# Patient Record
Sex: FEMALE | Race: WHITE | NOT HISPANIC OR LATINO | Employment: OTHER | URBAN - METROPOLITAN AREA
[De-identification: names, ages, dates, MRNs, and addresses within clinical notes are randomized per-mention and may not be internally consistent; named-entity substitution may affect disease eponyms.]

---

## 2017-01-03 ENCOUNTER — TRANSCRIBE ORDERS (OUTPATIENT)
Dept: MAMMOGRAPHY | Facility: HOSPITAL | Age: 72
End: 2017-01-03

## 2017-01-03 DIAGNOSIS — Z12.31 VISIT FOR SCREENING MAMMOGRAM: Primary | ICD-10-CM

## 2017-01-25 ENCOUNTER — HOSPITAL ENCOUNTER (OUTPATIENT)
Dept: MAMMOGRAPHY | Facility: HOSPITAL | Age: 72
Discharge: HOME | End: 2017-01-25
Attending: INTERNAL MEDICINE | Admitting: INTERNAL MEDICINE

## 2017-01-25 DIAGNOSIS — Z12.31 VISIT FOR SCREENING MAMMOGRAM: ICD-10-CM

## 2017-01-25 PROCEDURE — 77063 BREAST TOMOSYNTHESIS BI: CPT | Performed by: RADIOLOGY

## 2017-01-25 PROCEDURE — 77063 BREAST TOMOSYNTHESIS BI: CPT

## 2017-01-25 PROCEDURE — G0202 SCR MAMMO BI INCL CAD: HCPCS

## 2017-01-25 PROCEDURE — G0202 SCR MAMMO BI INCL CAD: HCPCS | Performed by: RADIOLOGY

## 2018-06-15 ENCOUNTER — APPOINTMENT (OUTPATIENT)
Dept: CT IMAGING | Facility: HOSPITAL | Age: 73
End: 2018-06-15

## 2018-06-15 ENCOUNTER — HOSPITAL ENCOUNTER (EMERGENCY)
Facility: HOSPITAL | Age: 73
Discharge: HOME | End: 2018-06-16
Attending: EMERGENCY MEDICINE | Admitting: EMERGENCY MEDICINE
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
## 2018-06-15 DIAGNOSIS — S02.2XXA CLOSED FRACTURE OF NASAL BONE, INITIAL ENCOUNTER: ICD-10-CM

## 2018-06-15 DIAGNOSIS — S01.511A LIP LACERATION, INITIAL ENCOUNTER: ICD-10-CM

## 2018-06-15 PROCEDURE — 99283 EMERGENCY DEPT VISIT LOW MDM: CPT

## 2018-06-15 PROCEDURE — 70450 CT HEAD/BRAIN W/O DYE: CPT

## 2018-06-15 PROCEDURE — 70486 CT MAXILLOFACIAL W/O DYE: CPT

## 2018-06-15 RX ORDER — ASPIRIN 81 MG/1
81 TABLET ORAL DAILY
COMMUNITY
End: 2020-07-13

## 2018-06-15 RX ORDER — LIDOCAINE HYDROCHLORIDE AND EPINEPHRINE 10; 10 MG/ML; UG/ML
10 INJECTION, SOLUTION INFILTRATION; PERINEURAL ONCE
Status: DISCONTINUED | OUTPATIENT
Start: 2018-06-15 | End: 2018-06-16 | Stop reason: HOSPADM

## 2018-06-16 VITALS
WEIGHT: 184 LBS | OXYGEN SATURATION: 94 % | TEMPERATURE: 97.5 F | HEART RATE: 106 BPM | BODY MASS INDEX: 31.41 KG/M2 | RESPIRATION RATE: 18 BRPM | SYSTOLIC BLOOD PRESSURE: 129 MMHG | DIASTOLIC BLOOD PRESSURE: 70 MMHG | HEIGHT: 64 IN

## 2018-06-16 RX ORDER — TRAMADOL HYDROCHLORIDE 50 MG/1
50 TABLET ORAL EVERY 6 HOURS PRN
Qty: 12 TABLET | Refills: 0 | Status: SHIPPED | OUTPATIENT
Start: 2018-06-16 | End: 2018-12-19

## 2018-06-16 RX ORDER — CHLORHEXIDINE GLUCONATE 0.12 MG/ML
15 RINSE ORAL 4 TIMES DAILY
Qty: 473 ML | Refills: 0 | Status: SHIPPED | OUTPATIENT
Start: 2018-06-16 | End: 2018-06-19 | Stop reason: SDUPTHER

## 2018-06-16 NOTE — ED PROVIDER NOTES
Subjective   Ms. White is a 72 y.o. female who presents to the ED with c/o a fall. She reports that she was stepping onto her driveway from the street earlier today when she accidentally tripped on the uneven concrete, fell forward, and hit her head. She then developed dizziness after the episode but she denies LOC. She currently complains of forehead tenderness and a laceration to her lower lip. Her tetanus shot is out of date. No other acute complaints at this time.        History provided by:  Patient  Fall   Mechanism of injury: fall    Injury location:  Mouth  Mouth injury location:  Lower inner lip  Fall:     Fall occurred:  Tripped    Impact surface:  [place]    Point of impact:  Head    Entrapped after fall: no    Protective equipment: none    Suspicion of alcohol use: no    Suspicion of drug use: no    Tetanus status:  Out of date  Prior to arrival data:     Patient ambulatory at scene: yes      Loss of consciousness: no      Amnesic to event: no    Associated symptoms: no loss of consciousness        Review of Systems   Skin: Positive for wound (Laceration to lower lip).   Neurological: Positive for dizziness. Negative for loss of consciousness.   All other systems reviewed and are negative.      Past Medical History:   Diagnosis Date   • Hyperlipidemia    • Hypertension        Allergies   Allergen Reactions   • Sulfa Antibiotics GI Intolerance   • Tetanus Toxoids        Past Surgical History:   Procedure Laterality Date   • KNEE SURGERY     • THYROID SURGERY         Family History   Problem Relation Age of Onset   • Breast cancer Neg Hx    • Ovarian cancer Neg Hx        Social History     Social History   • Marital status:      Social History Main Topics   • Smoking status: Never Smoker   • Smokeless tobacco: Never Used   • Alcohol use Yes      Comment: monthly drink   • Drug use: No     Other Topics Concern   • Not on file         Objective   Physical Exam   Constitutional: She is oriented to  person, place, and time. She appears well-developed and well-nourished. No distress.   HENT:   Head: Normocephalic and atraumatic.       Nose: No nasal septal hematoma.   Patient has left periorbital ecchymosis, and nasal swelling with mild blood right nare.   Eyes: Conjunctivae are normal. No scleral icterus.   Neck: Normal range of motion. Neck supple.   Cardiovascular: Normal rate, regular rhythm, normal heart sounds and intact distal pulses.    No murmur heard.  Pulmonary/Chest: Effort normal and breath sounds normal. No respiratory distress.   Abdominal: Soft. There is no tenderness.   Musculoskeletal: Normal range of motion. She exhibits no tenderness (No c-spine tenderness).   Neurological: She is alert and oriented to person, place, and time.   Skin: Skin is warm and dry. Laceration noted. She is not diaphoretic.   Patient has a 1.5cm parallel U-shaped laceration to her lower lip that does not cross the vermilion border. It is likely through and through.   Psychiatric: She has a normal mood and affect. Her behavior is normal.   Nursing note and vitals reviewed.      Laceration Repair  Date/Time: 6/16/2018 12:08 AM  Performed by: STEPHEN NEWTON  Authorized by: STEPHEN NEWTON     Consent:     Consent obtained:  Verbal    Consent given by:  Patient    Risks discussed:  Infection, pain, poor cosmetic result, poor wound healing, need for additional repair and nerve damage  Anesthesia (see MAR for exact dosages):     Anesthesia method:  Local infiltration    Local anesthetic:  Lidocaine 1% WITH epi  Laceration details:     Location:  Lip    Lip location:  Lower interior lip    Length (cm):  4  Repair type:     Repair type:  Intermediate  Pre-procedure details:     Preparation:  Patient was prepped and draped in usual sterile fashion and imaging obtained to evaluate for foreign bodies  Exploration:     Wound exploration: wound explored through full range of motion and entire depth of wound probed and  visualized      Wound extent: no foreign bodies/material noted and no underlying fracture noted      Contaminated: no    Treatment:     Area cleansed with:  Betadine    Amount of cleaning:  Extensive    Irrigation solution:  Sterile saline  Skin repair:     Repair method:  Sutures    Suture size:  5-0    Suture material:  Fast-absorbing gut    Suture technique:  Simple interrupted    Number of sutures:  3  Approximation:     Approximation:  Loose    Vermilion border: well-aligned    Post-procedure details:     Dressing:  Open (no dressing)    Patient tolerance of procedure:  Tolerated well, no immediate complications                   ED Course  ED Course as of Jun 16 0421   Sat Jun 16, 2018   0021 The inner laceration has no gaping and cannot be pulled apart, thus with infection risk I left this one open with no sutures.  The outside laceration with gaping open and thus we loosely approximated with 3 fast gut sutures.  Follow-up and return instructions were discussed with the patient.  [RS]      ED Course User Index  [RS] Zachariah Gilmore MD       No results found for this or any previous visit (from the past 24 hour(s)).  Note: In addition to lab results from this visit, the labs listed above may include labs taken at another facility or during a different encounter within the last 24 hours. Please correlate lab times with ED admission and discharge times for further clarification of the services performed during this visit.    CT Facial Bones Without Contrast   Final Result     Mildly displaced bilateral nasal bone fractures.         THIS DOCUMENT HAS BEEN ELECTRONICALLY SIGNED BY TOYA PRUITT MD      CT Head Without Contrast   Final Result     No acute intracranial abnormality.         THIS DOCUMENT HAS BEEN ELECTRONICALLY SIGNED BY TOYA PRUITT MD        Vitals:    06/15/18 2057 06/15/18 2337 06/16/18 0033   BP: (!) 191/78  129/70   BP Location: Left arm     Patient Position: Sitting     Pulse: 73 70 106  "  Resp: 16 18    Temp: 97.8 °F (36.6 °C)  97.5 °F (36.4 °C)   TempSrc: Oral  Axillary   SpO2: 96% 95% 94%   Weight: 83.5 kg (184 lb)     Height: 162.6 cm (64\")       Medications - No data to display  ECG/EMG Results (last 24 hours)     ** No results found for the last 24 hours. **                      MDM  Number of Diagnoses or Management Options  Closed fracture of nasal bone, initial encounter:   Fall from slip, trip, or stumble, initial encounter:   Lip laceration, initial encounter:      Amount and/or Complexity of Data Reviewed  Tests in the radiology section of CPT®: reviewed  Independent visualization of images, tracings, or specimens: yes        Final diagnoses:   Fall from slip, trip, or stumble, initial encounter   Lip laceration, initial encounter   Closed fracture of nasal bone, initial encounter       Documentation assistance provided by curtis Villafana.  Information recorded by the scribe was done at my direction and has been verified and validated by me.     Jossie Villafana  06/15/18 2308       Jossie Villafana  06/16/18 0021       Zachariah Gilmore MD  06/16/18 0421    "

## 2018-06-19 ENCOUNTER — OFFICE VISIT (OUTPATIENT)
Dept: FAMILY MEDICINE CLINIC | Facility: CLINIC | Age: 73
End: 2018-06-19

## 2018-06-19 VITALS
SYSTOLIC BLOOD PRESSURE: 126 MMHG | WEIGHT: 186 LBS | DIASTOLIC BLOOD PRESSURE: 68 MMHG | HEIGHT: 64 IN | BODY MASS INDEX: 31.76 KG/M2

## 2018-06-19 DIAGNOSIS — E66.9 OBESITY (BMI 30-39.9): ICD-10-CM

## 2018-06-19 DIAGNOSIS — S02.2XXA CLOSED FRACTURE OF NASAL BONE, INITIAL ENCOUNTER: ICD-10-CM

## 2018-06-19 DIAGNOSIS — I10 ESSENTIAL HYPERTENSION: Primary | ICD-10-CM

## 2018-06-19 DIAGNOSIS — E78.5 HYPERLIPIDEMIA, UNSPECIFIED HYPERLIPIDEMIA TYPE: ICD-10-CM
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
## 2018-06-19 PROBLEM — S42.202D CLOSED FRACTURE OF PROXIMAL END OF LEFT HUMERUS WITH ROUTINE HEALING: Status: ACTIVE | Noted: 2018-06-19

## 2018-06-19 PROBLEM — Z12.11 SCREEN FOR COLON CANCER: Status: ACTIVE | Noted: 2018-06-19

## 2018-06-19 PROBLEM — Q66.70 HIGH ARCHES: Status: ACTIVE | Noted: 2018-06-19

## 2018-06-19 PROCEDURE — 99203 OFFICE O/P NEW LOW 30 MIN: CPT | Performed by: INTERNAL MEDICINE

## 2018-06-19 RX ORDER — CHLORHEXIDINE GLUCONATE 0.12 MG/ML
15 RINSE ORAL 4 TIMES DAILY
Qty: 473 ML | Refills: 0 | Status: SHIPPED | OUTPATIENT
Start: 2018-06-19 | End: 2018-06-21 | Stop reason: SDUPTHER

## 2018-06-19 NOTE — PROGRESS NOTES
72F here to establish care. Prior pt of Dr. Brush.      -5d prior , tripped and fell forward, landing on face - b/l nasal fractures - seeing ent this week, sutures to bottom lip, ct face and ct head performed - no bleed, fracture as above.  Currently no pain. No h/a, dizziness, no vision changes.      Chronic issues:    htn - compliant with med, metoprolol  Hyperlipidemia - taking pravastatin without myalgias.      Ros:   occassional nocturia, incontinence, +frquent urination, +itching, +difficulty wakling(chronic), +sleep disturbance, +mm weakness (getting out of chair),sometimes feels cold  Comprehensive review otherwise negative.    CPE in 3/18    HM: pt documents most  utd with Dr. Brush    Patient Active Problem List   Diagnosis   • Hyperlipidemia   • Essential hypertension   • High arches   • Screen for colon cancer   • Closed fracture of proximal end of left humerus with routine healing   • Nasal fracture   • Obesity (BMI 30-39.9)      Past Surgical History:   Procedure Laterality Date   • KNEE SURGERY      partial replacement both knees -  - 2014   • PARATHYROIDECTOMY      1/4 removed 2011        Current Outpatient Prescriptions:   •  chlorhexidine (PERIDEX) 0.12 % solution, Apply 15 mL to the mouth or throat 4 (Four) Times a Day., Disp: 473 mL, Rfl: 0  •  metoprolol tartrate (LOPRESSOR) 50 MG tablet, Take 50 mg by mouth 2 (Two) Times a Day., Disp: , Rfl:   •  pravastatin (PRAVACHOL) 40 MG tablet, Take 40 mg by mouth Daily., Disp: , Rfl:   •  traMADol (ULTRAM) 50 MG tablet, Take 1 tablet by mouth Every 6 (Six) Hours As Needed for Moderate Pain ., Disp: 12 tablet, Rfl: 0  •  vitamin E 100 UNIT capsule, Take 100 Units by mouth Daily., Disp: , Rfl:   •  aspirin 81 MG EC tablet, Take 81 mg by mouth Daily., Disp: , Rfl:    Allergies   Allergen Reactions   • Sulfa Antibiotics GI Intolerance   • Tetanus Toxoids      Social History     Social History   • Marital status:      Spouse name:  "Luis Alberto   • Number of children: 2   • Years of education: N/A     Occupational History   • 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    2 kids:    Ashli and Con.    1 gk    Retired teacher.    Hobbies: cross stitch, Silver Sneakers              Family History   Problem Relation Age of Onset   • Alzheimer's disease Mother    • Coronary artery disease Father    • Multiple sclerosis Father    • ALS Brother    • Hypertension Brother    • Diabetes type II Son    • Breast cancer Neg Hx    • Ovarian cancer Neg Hx       /68   Ht 162.6 cm (64.02\")   Wt 84.4 kg (186 lb)   Breastfeeding? No   BMI 31.91 kg/m²   Gen: well appearing in nad, no resp effort  Head/ face: echymosis over eyes and bridge of nose b/l, tenderness to palpation of bridge of nose  Eyes: conjunctiva clear, perrl, eomi  ENT: mmm, no thyromegaly, no lymphadenopathy, lower lip with sutures, no active bleeding  CV: s1, s2 reg no m/r/g, no bruits, no jvd  No peripheral edema, pedal pulses intact  Resp:  clear b/l no w/r/r  GI:  soft nt/nd  Skin: no clubbing or cyanosis  Neuro: no focal deficits.    A/P    1. Essential hypertension - well controlled, cont current med   2. Hyperlipidemia, unspecified hyperlipidemia type - cont pravastatin  Will review records for recent fasting labs done onc ereceived   3. Obesity (BMI 30-39.9)   -counseled on wt, bmi, goals for wt loss, exercise, diet -    4. Closed fracture of nasal bone, initial encounter - has ent consult tomorrow   5. Fall, initial encounter - mechanical 5d prior       F/u 3mo chronic issues  Request of medical records  "

## 2018-06-21 ENCOUNTER — TELEPHONE (OUTPATIENT)
Dept: FAMILY MEDICINE CLINIC | Facility: CLINIC | Age: 73
End: 2018-06-21

## 2018-06-21 RX ORDER — CHLORHEXIDINE GLUCONATE 0.12 MG/ML
15 RINSE ORAL 4 TIMES DAILY
Qty: 473 ML | Refills: 0 | Status: SHIPPED | OUTPATIENT
Start: 2018-06-21 | End: 2019-06-03

## 2018-06-21 NOTE — TELEPHONE ENCOUNTER
PT NEEDS REFILL ON CHLORHEXIDINE 0.12% SOLUTION AT [facility]. PT IS LEAVING ON SAT FOR VACATION PLEASE CALL ASAP

## 2018-09-04 ENCOUNTER — TRANSCRIBE ORDERS (OUTPATIENT)
Dept: ADMINISTRATIVE | Facility: HOSPITAL | Age: 73
End: 2018-09-04

## 2018-09-04 DIAGNOSIS — Z12.31 VISIT FOR SCREENING MAMMOGRAM: Primary | ICD-10-CM

## 2018-10-10 ENCOUNTER — HOSPITAL ENCOUNTER (OUTPATIENT)
Dept: MAMMOGRAPHY | Facility: HOSPITAL | Age: 73
Discharge: HOME | End: 2018-10-10
Attending: INTERNAL MEDICINE | Admitting: INTERNAL MEDICINE

## 2018-10-10 DIAGNOSIS — Z12.31 VISIT FOR SCREENING MAMMOGRAM: ICD-10-CM

## 2018-10-10 PROCEDURE — 77067 SCR MAMMO BI INCL CAD: CPT

## 2018-10-10 PROCEDURE — 77067 SCR MAMMO BI INCL CAD: CPT | Performed by: RADIOLOGY

## 2018-10-10 PROCEDURE — 77063 BREAST TOMOSYNTHESIS BI: CPT

## 2018-10-10 PROCEDURE — 77063 BREAST TOMOSYNTHESIS BI: CPT | Performed by: RADIOLOGY

## 2018-12-19 ENCOUNTER — OFFICE VISIT (OUTPATIENT)
Dept: FAMILY MEDICINE CLINIC | Facility: CLINIC | Age: 73
End: 2018-12-19

## 2018-12-19 VITALS
OXYGEN SATURATION: 99 % | BODY MASS INDEX: 32.65 KG/M2 | HEIGHT: 65 IN | DIASTOLIC BLOOD PRESSURE: 84 MMHG | WEIGHT: 196 LBS | SYSTOLIC BLOOD PRESSURE: 138 MMHG | HEART RATE: 64 BPM

## 2018-12-19 DIAGNOSIS — E78.5 HYPERLIPIDEMIA, UNSPECIFIED HYPERLIPIDEMIA TYPE: ICD-10-CM

## 2018-12-19 DIAGNOSIS — I10 ESSENTIAL HYPERTENSION: Primary | ICD-10-CM

## 2018-12-19 DIAGNOSIS — E66.9 OBESITY (BMI 30-39.9): ICD-10-CM

## 2018-12-19 PROBLEM — E21.3 HYPERPARATHYROIDISM: Status: ACTIVE | Noted: 2018-12-19

## 2018-12-19 PROCEDURE — 90662 IIV NO PRSV INCREASED AG IM: CPT | Performed by: INTERNAL MEDICINE

## 2018-12-19 PROCEDURE — G0008 ADMIN INFLUENZA VIRUS VAC: HCPCS | Performed by: INTERNAL MEDICINE

## 2018-12-19 PROCEDURE — 99213 OFFICE O/P EST LOW 20 MIN: CPT | Performed by: INTERNAL MEDICINE

## 2018-12-19 NOTE — PATIENT INSTRUCTIONS
I recommend the Shingrix vaccine to prevent shingles. (even if you have had a previous shingles vaccine or have had an outbreak of shingles).    A person who has not received a shingles vaccine has a lifetime risk of shingles of 33%.  The old version of the shingles vaccine (Zostavax) reduced the risk of shingles to 16%.  The new SHINGRIX vaccine reduces the lifetime risk of shingles to approximately 8%.  In addition, the SHINGRIX vaccine reduces the severity of shingles in those who develop shingles. It is a 2 dose vaccine. The second vaccine should be administered 2 - 6 months after the first.     The CDC recommends this vaccine for patients over 50 years of age. They recommend that patients who received the prior shingles vaccine, get this new one because of its improved effectiveness. Due to high demand and short supply, we are experiencing some intermittent shortages of vaccine. If you didn't receive your vaccine today, call our office every 2-4 weeks to see if it is restocked.    Medicare does NOT cover SHINGRIX in doctors' offices.   Medicare Part D covers SHINGRIX well at most pharmacies (where the cost is similar to a typical medication co-pay).         Pursue hepatitis A vaccine

## 2018-12-19 NOTE — PROGRESS NOTES
72F here for f/u chronic issues:    Immunization review:   Flu shot today  Recommend hep a and shingrix    Htn - compliant with med metoprolol  No home checks, well controlled in office     Hyperlipidemia - on statin and tolerating without myalgias.    Obesity - limited exercise, tries to comply with healthy diet.  Low salt diet.  Eats out moderately  Wt Readings from Last 3 Encounters:   12/19/18 88.9 kg (196 lb)   12/03/18 86.2 kg (190 lb)   06/19/18 84.4 kg (186 lb)         Hair loss - gradual, diffuse, discussed with dermatology in past  Taking biotin with some effect.    Review of Systems   General: no fatigue, fever/chills, unintentional wt loss, malaise, night sweats  Skin: no rash, no hives, no lesions,   Eyes: no visual disturbance   Heme: no brusing, no bleeding  ENT: no hearing loss, no dizziness, no nosebleed, no hoarseness  Endocrine: , no polyuria, polyphagia, polydipsia, no heat or cold intolerance  GI: no nausea, no vomiting, no diarrhea, no constipation, no bleeding, no pain  : no dysuria, no urinary frequency,, no hematuria, or incontinence  Extremities: no edema, , no claudication  Cardiac: no chest pain, no palpitations, no orthopnea, no PND  Respiratory: no cough, no sputum, no wheezing, no sob , no hemoptysis  Neuro: no headache, no seizure,, no paresthesias or weakness  Psych: no anxiety, no depression    Patient Active Problem List   Diagnosis   • Hyperlipidemia   • Essential hypertension   • High arches   • Screen for colon cancer   • Closed fracture of proximal end of left humerus with routine healing   • Nasal fracture   • Obesity (BMI 30-39.9)   • Hyperparathyroidism (CMS/HCC)      Past Surgical History:   Procedure Laterality Date   • KNEE SURGERY      partial replacement both knees -  - 2014   • PARATHYROIDECTOMY      1/4 removed 2011        Current Outpatient Medications:   •  aspirin 81 MG EC tablet, Take 81 mg by mouth Daily., Disp: , Rfl:   •  azelastine (ASTELIN)  0.1 % nasal spray, 1 spray into the nostril(s) as directed by provider 2 (Two) Times a Day., Disp: 1 each, Rfl: 0  •  Calcium Carbonate-Vitamin D (CALCIUM-D PO), Take  by mouth., Disp: , Rfl:   •  chlorhexidine (PERIDEX) 0.12 % solution, Apply 15 mL to the mouth or throat 4 (Four) Times a Day., Disp: 473 mL, Rfl: 0  •  metoprolol tartrate (LOPRESSOR) 50 MG tablet, Take 50 mg by mouth 2 (Two) Times a Day., Disp: , Rfl:   •  pravastatin (PRAVACHOL) 40 MG tablet, Take 40 mg by mouth Daily., Disp: , Rfl:   •  vitamin E 100 UNIT capsule, Take 100 Units by mouth Daily., Disp: , Rfl:    Allergies   Allergen Reactions   • Morphine Nausea Only   • Sulfa Antibiotics GI Intolerance   • Tetanus Toxoid Hives   • Tetanus Toxoids      Social History     Socioeconomic History   • Marital status:      Spouse name: Luis Alberto   • Number of children: 2   • Years of education: Not on file   • Highest education level: Not on file   Social Needs   • Financial resource strain: Not on file   • Food insecurity - worry: Not on file   • Food insecurity - inability: Not on file   • Transportation needs - medical: Not on file   • Transportation needs - non-medical: Not on file   Occupational History   • Occupation: 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    2 kids:    Ashli and Con.    1     Retired teacher.    Hobbies: cross stitch, Silver Sneakers          Family History   Problem Relation Age of Onset   • Alzheimer's disease Mother    • Coronary artery disease Father    • Multiple sclerosis Father    • Hypertension Father    • Heart attack Father    • ALS Brother    • Hypertension Brother    • Diabetes type II Son    • Asthma  "Son    • Breast cancer Neg Hx    • Ovarian cancer Neg Hx       /84   Pulse 64   Ht 165.1 cm (65\")   Wt 88.9 kg (196 lb)   SpO2 99%   BMI 32.62 kg/m²   Gen: well appearing in nad, no resp effort  Eyes: conjunctiva clear, perrl, eomi  ENT: mmm, no thyromegaly, no lymphadenopathy  CV: s1, s2 reg no m/r/g, no bruits, no jvd  No peripheral edema, pedal pulses intact  Resp:  clear b/l no w/r/r  GI:  soft nt/nd  Skin: no clubbing or cyanosis  Neuro: no focal deficits.    A/P    1. Essential hypertension - well controlled, cont current med   2. Hyperlipidemia, unspecified hyperlipidemia type - cont statin   3. Obesity (BMI 30-39.9) - counseled on wt, bmi, diet, exercise and goals for wt loss     Recommend hepatitis A and Shingrix vaccines            "

## 2019-05-02 ENCOUNTER — TELEPHONE (OUTPATIENT)
Dept: FAMILY MEDICINE CLINIC | Facility: CLINIC | Age: 74
End: 2019-05-02

## 2019-05-02 RX ORDER — PRAVASTATIN SODIUM 40 MG
40 TABLET ORAL DAILY
Qty: 30 TABLET | Refills: 0 | Status: SHIPPED | OUTPATIENT
Start: 2019-05-02 | End: 2019-06-08 | Stop reason: SDUPTHER

## 2019-05-02 RX ORDER — METOPROLOL TARTRATE 50 MG/1
50 TABLET, FILM COATED ORAL DAILY
Qty: 30 TABLET | Refills: 0 | Status: SHIPPED | OUTPATIENT
Start: 2019-05-02 | End: 2019-06-08 | Stop reason: SDUPTHER

## 2019-05-02 NOTE — TELEPHONE ENCOUNTER
Renuka Storey called, Patient normally gets Metoprolol Succinate ER but metoprolol tartrate rapid release was sent in. Is this correct? Please call back at [phone]

## 2019-05-30 RX ORDER — PRAVASTATIN SODIUM 40 MG
TABLET ORAL
Qty: 30 TABLET | Refills: 0 | OUTPATIENT
Start: 2019-05-30

## 2019-05-30 RX ORDER — METOPROLOL SUCCINATE 50 MG/1
TABLET, EXTENDED RELEASE ORAL
Qty: 30 TABLET | Refills: 0 | OUTPATIENT
Start: 2019-05-30

## 2019-06-02 PROBLEM — E78.00 PURE HYPERCHOLESTEROLEMIA: Status: ACTIVE | Noted: 2018-06-19

## 2019-06-02 NOTE — PROGRESS NOTES
Subjective   Cindy Phillips is a 73 y.o. female.     History of Present Illness     New pt here to establish. H/o:    Hyperlipidemia-she is on pravastatin.  Last labs in [facility] are from March 2018 and include FLP, A1c, CMP, TSH, D.  These were all normal.  Her D was 35.  Glucose was 105.  A1c was 5.6.  LDL was 115.  She does not think she has had her cholesterol labs checked since then, though her dermatologist did do some blood work earlier this year to evaluate the pruritus    Hypertension-she is on metoprolol and generally BPs are ~130/70 at the doctor's offices.  She says her blood pressure medication was started about 8 or 9 years ago after she got dehydrated and fell and broke her left arm (saw Dr. Landrum at the time).  May have been started for the autonomic dysfunction rather than hypertension    Rash over the last 2 months - started on her back and was very itchy, then spread onto trunck and some on her legs/arms as well. she has tried different OTCs - sarna - made it worse, and now is now using benadryl cream and also is on daily zytrec. she has been seeing dermatology.  She will be having patch testing today as it is felt to be a contact  dermatitis    Hair thinning over the last 6 months. She tried biotin and hair/nail vitamin, but she stopped these in case they were contributing to the rash. She had CBC, liver, viral hep, vit D - and these were all ok.  Dermatologist has given nurse shampoo as well but she has not felt like it has helped that much    Long history of frequent falls and poor balance.  She had previously seen Dr. Brush and she said Dr. Brush had sent her to see a neurologist but no diagnosis was made.  She does not remember ever having a nerve conduction test.  She does not feel like she has neuropathy- sensation seems normal on her feet.  She does have a diagnosis of autonomic dysfunction listed in her chart    The following portions of the patient's history were reviewed and updated  "as appropriate: allergies, current medications, past family history, past medical history, past social history, past surgical history and problem list.    Review of Systems   Constitutional: Negative for activity change, appetite change, fever, unexpected weight gain and unexpected weight loss.   Respiratory: Negative for shortness of breath.    Cardiovascular: Negative for chest pain.   Musculoskeletal: Positive for arthralgias.   Skin: Positive for rash.   Allergic/Immunologic: Negative for immunocompromised state.   Neurological: Positive for weakness (B legs). Negative for seizures, memory problem and confusion.   Psychiatric/Behavioral: Negative for agitation and depressed mood.       Objective   /76 (BP Location: Right arm)   Pulse 54   Temp 98.1 °F (36.7 °C) (Temporal)   Ht 163.7 cm (64.45\")   Wt 85.9 kg (189 lb 6.4 oz)   SpO2 98%   BMI 32.06 kg/m²   Physical Exam   Constitutional: She is oriented to person, place, and time. She appears well-developed and well-nourished. No distress.   HENT:   Head: Normocephalic and atraumatic.   Eyes: Conjunctivae and EOM are normal. Pupils are equal, round, and reactive to light. Right eye exhibits no discharge. Left eye exhibits no discharge.   Neck: Normal range of motion. Neck supple.   Cardiovascular: Normal rate and regular rhythm.   Pulmonary/Chest: Effort normal and breath sounds normal.   Abdominal: Soft. Bowel sounds are normal.   Musculoskeletal: She exhibits no edema.   Neurological: She is alert and oriented to person, place, and time. She displays abnormal reflex (3/4 B UE, LE). No cranial nerve deficit. She exhibits normal muscle tone. Coordination normal.   Does have to use her hands to push off from chair when standing up   Skin: Skin is warm and dry. Rash (.Diffuse erythematous rash on upper chest.  Smaller patches of erythematous macules on her back and left arm) noted. She is not diaphoretic.   Psychiatric: She has a normal mood and affect. " Her behavior is normal. Judgment and thought content normal.   Nursing note and vitals reviewed.        Assessment/Plan   Cindy was seen today for establish care, rash, hypertension and hyperlipidemia.    Diagnoses and all orders for this visit:    Essential hypertension -good control    Pure hypercholesterolemia -check today  -     CK  -     Comprehensive Metabolic Panel  -     Lipid Panel    Weakness -will look through her old records and see if we can find a neurology evaluation.  -     Vitamin B12  -     CK    Frequent falls -will review old records.  We discussed trying physical therapy again, and she is willing to do this.  She wants to wait until she has the allergy testing done first, so we may order this when she returns for her wellness exam  -     Vitamin B12    Rash -she is being evaluated by dermatology and will have patch testing done today        D/c ASA and vit E  Prev -Ty due 10/19.  Colonoscopy is due 4/20 (previously done by Dr. Harrington, h/o polyps).  Last DEXA was 4/17 so we can repeat this when she returns for her wellness exam.

## 2019-06-03 ENCOUNTER — OFFICE VISIT (OUTPATIENT)
Dept: INTERNAL MEDICINE | Facility: CLINIC | Age: 74
End: 2019-06-03

## 2019-06-03 VITALS
WEIGHT: 189.4 LBS | HEART RATE: 54 BPM | BODY MASS INDEX: 32.33 KG/M2 | DIASTOLIC BLOOD PRESSURE: 76 MMHG | OXYGEN SATURATION: 98 % | TEMPERATURE: 98.1 F | HEIGHT: 64 IN | SYSTOLIC BLOOD PRESSURE: 130 MMHG

## 2019-06-03 DIAGNOSIS — E78.00 PURE HYPERCHOLESTEROLEMIA: ICD-10-CM

## 2019-06-03 DIAGNOSIS — I10 ESSENTIAL HYPERTENSION: Primary | ICD-10-CM

## 2019-06-03 DIAGNOSIS — R21 RASH: ICD-10-CM

## 2019-06-03 DIAGNOSIS — R53.1 WEAKNESS: ICD-10-CM

## 2019-06-03 DIAGNOSIS — R29.6 FREQUENT FALLS: ICD-10-CM

## 2019-06-03 LAB
ALBUMIN SERPL-MCNC: 4.2 G/DL (ref 3.5–5.2)
ALBUMIN/GLOB SERPL: 1.4 G/DL
ALP SERPL-CCNC: 80 U/L (ref 39–117)
ALT SERPL W P-5'-P-CCNC: 14 U/L (ref 1–33)
ANION GAP SERPL CALCULATED.3IONS-SCNC: 10.3 MMOL/L
AST SERPL-CCNC: 22 U/L (ref 1–32)
BILIRUB SERPL-MCNC: 0.3 MG/DL (ref 0.2–1.2)
BUN BLD-MCNC: 18 MG/DL (ref 8–23)
BUN/CREAT SERPL: 25 (ref 7–25)
CALCIUM SPEC-SCNC: 9.5 MG/DL (ref 8.6–10.5)
CHLORIDE SERPL-SCNC: 105 MMOL/L (ref 98–107)
CHOLEST SERPL-MCNC: 223 MG/DL (ref 0–200)
CK SERPL-CCNC: 57 U/L (ref 20–180)
CO2 SERPL-SCNC: 25.7 MMOL/L (ref 22–29)
CREAT BLD-MCNC: 0.72 MG/DL (ref 0.57–1)
GFR SERPL CREATININE-BSD FRML MDRD: 80 ML/MIN/1.73
GLOBULIN UR ELPH-MCNC: 3 GM/DL
GLUCOSE BLD-MCNC: 105 MG/DL (ref 65–99)
HDLC SERPL-MCNC: 61 MG/DL (ref 40–60)
LDLC SERPL CALC-MCNC: 142 MG/DL (ref 0–100)
LDLC/HDLC SERPL: 2.32 {RATIO}
POTASSIUM BLD-SCNC: 4.5 MMOL/L (ref 3.5–5.2)
PROT SERPL-MCNC: 7.2 G/DL (ref 6–8.5)
SODIUM BLD-SCNC: 141 MMOL/L (ref 136–145)
TRIGL SERPL-MCNC: 101 MG/DL (ref 0–150)
VLDLC SERPL-MCNC: 20.2 MG/DL (ref 5–40)

## 2019-06-03 PROCEDURE — 99214 OFFICE O/P EST MOD 30 MIN: CPT | Performed by: INTERNAL MEDICINE

## 2019-06-03 PROCEDURE — 80053 COMPREHEN METABOLIC PANEL: CPT | Performed by: INTERNAL MEDICINE

## 2019-06-03 PROCEDURE — 80061 LIPID PANEL: CPT | Performed by: INTERNAL MEDICINE

## 2019-06-03 PROCEDURE — 82607 VITAMIN B-12: CPT | Performed by: INTERNAL MEDICINE

## 2019-06-03 PROCEDURE — 82550 ASSAY OF CK (CPK): CPT | Performed by: INTERNAL MEDICINE

## 2019-06-04 LAB — VIT B12 BLD-MCNC: 343 PG/ML (ref 211–946)

## 2019-06-08 RX ORDER — PRAVASTATIN SODIUM 40 MG
40 TABLET ORAL DAILY
Qty: 90 TABLET | Refills: 1 | Status: SHIPPED | OUTPATIENT
Start: 2019-06-08 | End: 2019-12-05 | Stop reason: SDUPTHER

## 2019-06-08 RX ORDER — METOPROLOL TARTRATE 50 MG/1
50 TABLET, FILM COATED ORAL DAILY
Qty: 90 TABLET | Refills: 1 | Status: SHIPPED | OUTPATIENT
Start: 2019-06-08 | End: 2019-06-10

## 2019-06-10 ENCOUNTER — TELEPHONE (OUTPATIENT)
Dept: INTERNAL MEDICINE | Facility: CLINIC | Age: 74
End: 2019-06-10

## 2019-06-10 RX ORDER — METOPROLOL SUCCINATE 50 MG/1
50 TABLET, EXTENDED RELEASE ORAL DAILY
Qty: 90 TABLET | Refills: 1 | Status: SHIPPED | OUTPATIENT
Start: 2019-06-10 | End: 2019-12-05 | Stop reason: SDUPTHER

## 2019-06-10 NOTE — TELEPHONE ENCOUNTER
Renuka Blacmkan called to say she was previously on the metoprolol succinate 50 mg.  They wanted to know if we can change it to the succinate.  It was sent in May for the lopressor and Renuka called it was changed to the succinate.

## 2019-07-01 ENCOUNTER — OFFICE VISIT (OUTPATIENT)
Dept: INTERNAL MEDICINE | Facility: CLINIC | Age: 74
End: 2019-07-01

## 2019-07-01 VITALS
HEART RATE: 53 BPM | HEIGHT: 64 IN | TEMPERATURE: 97.4 F | WEIGHT: 186.8 LBS | SYSTOLIC BLOOD PRESSURE: 124 MMHG | OXYGEN SATURATION: 98 % | DIASTOLIC BLOOD PRESSURE: 68 MMHG | BODY MASS INDEX: 31.89 KG/M2

## 2019-07-01 DIAGNOSIS — E78.00 PURE HYPERCHOLESTEROLEMIA: ICD-10-CM

## 2019-07-01 DIAGNOSIS — R13.19 ESOPHAGEAL DYSPHAGIA: ICD-10-CM

## 2019-07-01 DIAGNOSIS — I10 ESSENTIAL HYPERTENSION: ICD-10-CM

## 2019-07-01 DIAGNOSIS — Z78.0 POSTMENOPAUSAL: ICD-10-CM

## 2019-07-01 DIAGNOSIS — Z00.00 ENCOUNTER FOR ANNUAL HEALTH EXAMINATION: ICD-10-CM

## 2019-07-01 DIAGNOSIS — Z00.00 ENCOUNTER FOR MEDICARE ANNUAL WELLNESS EXAM: Primary | ICD-10-CM

## 2019-07-01 PROCEDURE — G0439 PPPS, SUBSEQ VISIT: HCPCS | Performed by: INTERNAL MEDICINE

## 2019-07-01 RX ORDER — PREDNISONE 10 MG/1
10 TABLET ORAL DAILY
COMMUNITY
End: 2019-12-12

## 2019-07-01 RX ORDER — FLUTICASONE PROPIONATE 50 MCG
2 SPRAY, SUSPENSION (ML) NASAL DAILY
Qty: 1 BOTTLE | Refills: 11 | Status: SHIPPED | OUTPATIENT
Start: 2019-07-01 | End: 2020-07-01

## 2019-07-18 ENCOUNTER — OUTSIDE FACILITY SERVICE (OUTPATIENT)
Dept: GASTROENTEROLOGY | Facility: CLINIC | Age: 74
End: 2019-07-18

## 2019-07-18 ENCOUNTER — LAB REQUISITION (OUTPATIENT)
Dept: LAB | Facility: HOSPITAL | Age: 74
End: 2019-07-18

## 2019-07-18 DIAGNOSIS — K44.9 DIAPHRAGMATIC HERNIA WITHOUT OBSTRUCTION OR GANGRENE: ICD-10-CM

## 2019-07-18 DIAGNOSIS — R13.10 DYSPHAGIA: ICD-10-CM

## 2019-07-18 PROCEDURE — 43239 EGD BIOPSY SINGLE/MULTIPLE: CPT | Performed by: INTERNAL MEDICINE

## 2019-07-18 PROCEDURE — 88305 TISSUE EXAM BY PATHOLOGIST: CPT | Performed by: INTERNAL MEDICINE

## 2019-07-18 PROCEDURE — 43249 ESOPH EGD DILATION <30 MM: CPT | Performed by: INTERNAL MEDICINE

## 2019-07-18 PROCEDURE — G0500 MOD SEDAT ENDO SERVICE >5YRS: HCPCS | Performed by: INTERNAL MEDICINE

## 2019-07-19 LAB
CYTO UR: NORMAL
LAB AP CASE REPORT: NORMAL
LAB AP CLINICAL INFORMATION: NORMAL
PATH REPORT.FINAL DX SPEC: NORMAL
PATH REPORT.GROSS SPEC: NORMAL

## 2019-11-08 ENCOUNTER — TRANSCRIBE ORDERS (OUTPATIENT)
Dept: ADMINISTRATIVE | Facility: HOSPITAL | Age: 74
End: 2019-11-08

## 2019-11-08 DIAGNOSIS — Z12.31 VISIT FOR SCREENING MAMMOGRAM: Primary | ICD-10-CM

## 2019-11-08 RX ORDER — OMEPRAZOLE 20 MG/1
CAPSULE, DELAYED RELEASE ORAL
Qty: 90 CAPSULE | Refills: 4 | Status: SHIPPED | OUTPATIENT
Start: 2019-11-08 | End: 2021-01-05 | Stop reason: SDUPTHER

## 2019-12-05 RX ORDER — PRAVASTATIN SODIUM 40 MG
TABLET ORAL
Qty: 30 TABLET | Refills: 0 | Status: SHIPPED | OUTPATIENT
Start: 2019-12-05 | End: 2020-01-07 | Stop reason: SDUPTHER

## 2019-12-05 RX ORDER — METOPROLOL SUCCINATE 50 MG/1
TABLET, EXTENDED RELEASE ORAL
Qty: 30 TABLET | Refills: 0 | Status: SHIPPED | OUTPATIENT
Start: 2019-12-05 | End: 2020-01-07 | Stop reason: SDUPTHER

## 2019-12-08 ENCOUNTER — HOSPITAL ENCOUNTER (EMERGENCY)
Facility: HOSPITAL | Age: 74
Discharge: HOME | End: 2019-12-08
Attending: EMERGENCY MEDICINE | Admitting: EMERGENCY MEDICINE

## 2019-12-08 ENCOUNTER — APPOINTMENT (OUTPATIENT)
Dept: GENERAL RADIOLOGY | Facility: HOSPITAL | Age: 74
End: 2019-12-08

## 2019-12-08 VITALS
SYSTOLIC BLOOD PRESSURE: 160 MMHG | HEART RATE: 56 BPM | HEIGHT: 64 IN | WEIGHT: 185 LBS | TEMPERATURE: 98.5 F | BODY MASS INDEX: 31.58 KG/M2 | DIASTOLIC BLOOD PRESSURE: 64 MMHG | OXYGEN SATURATION: 96 % | RESPIRATION RATE: 12 BRPM

## 2019-12-08 DIAGNOSIS — S30.1XXA CONTUSION OF ABDOMINAL WALL, INITIAL ENCOUNTER: ICD-10-CM

## 2019-12-08 DIAGNOSIS — S20.211A CHEST WALL CONTUSION, RIGHT, INITIAL ENCOUNTER: Primary | ICD-10-CM

## 2019-12-08 PROCEDURE — 71101 X-RAY EXAM UNILAT RIBS/CHEST: CPT

## 2019-12-08 PROCEDURE — 99283 EMERGENCY DEPT VISIT LOW MDM: CPT

## 2019-12-08 RX ORDER — HYDROCODONE BITARTRATE AND ACETAMINOPHEN 5; 325 MG/1; MG/1
1 TABLET ORAL ONCE
Status: COMPLETED | OUTPATIENT
Start: 2019-12-08 | End: 2019-12-08

## 2019-12-08 RX ORDER — ONDANSETRON 4 MG/1
4 TABLET, ORALLY DISINTEGRATING ORAL EVERY 6 HOURS PRN
Qty: 20 TABLET | Refills: 0 | Status: SHIPPED | OUTPATIENT
Start: 2019-12-08 | End: 2019-12-13

## 2019-12-08 RX ORDER — HYDROCODONE BITARTRATE AND ACETAMINOPHEN 5; 325 MG/1; MG/1
1 TABLET ORAL EVERY 6 HOURS PRN
Qty: 12 TABLET | Refills: 0 | Status: SHIPPED | OUTPATIENT
Start: 2019-12-08 | End: 2019-12-20

## 2019-12-08 RX ADMIN — HYDROCODONE BITARTRATE AND ACETAMINOPHEN 1 TABLET: 5; 325 TABLET ORAL at 15:30

## 2019-12-08 NOTE — DISCHARGE INSTRUCTIONS
By ice to area of pain.    Take Tylenol or ibuprofen as needed for pain, and Lortab as needed for more severe pain.    Use incentive spirometry to make sure that you take good deep breaths on a regular basis.    Follow-up with primary care physician for recheck within the next week.
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
## 2019-12-12 ENCOUNTER — HOSPITAL ENCOUNTER (OUTPATIENT)
Dept: CARDIOLOGY | Facility: HOSPITAL | Age: 74
Discharge: HOME | End: 2019-12-12
Admitting: NURSE PRACTITIONER

## 2019-12-12 ENCOUNTER — OFFICE VISIT (OUTPATIENT)
Dept: INTERNAL MEDICINE | Facility: CLINIC | Age: 74
End: 2019-12-12

## 2019-12-12 VITALS
BODY MASS INDEX: 32.88 KG/M2 | HEART RATE: 69 BPM | SYSTOLIC BLOOD PRESSURE: 122 MMHG | RESPIRATION RATE: 16 BRPM | OXYGEN SATURATION: 96 % | DIASTOLIC BLOOD PRESSURE: 62 MMHG | HEIGHT: 64 IN | WEIGHT: 192.6 LBS | TEMPERATURE: 97.9 F

## 2019-12-12 VITALS — WEIGHT: 192 LBS | HEIGHT: 64 IN | BODY MASS INDEX: 32.78 KG/M2

## 2019-12-12 DIAGNOSIS — M79.605 LEFT LEG PAIN: ICD-10-CM

## 2019-12-12 DIAGNOSIS — S80.02XA TRAUMATIC HEMATOMA OF LEFT KNEE, INITIAL ENCOUNTER: ICD-10-CM

## 2019-12-12 DIAGNOSIS — S80.02XA CONTUSION OF LEFT KNEE, INITIAL ENCOUNTER: ICD-10-CM
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
## 2019-12-12 DIAGNOSIS — M25.562 ACUTE PAIN OF LEFT KNEE: ICD-10-CM

## 2019-12-12 DIAGNOSIS — M79.89 LEFT LEG SWELLING: ICD-10-CM
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
## 2019-12-12 LAB
BH CV LOWER VASCULAR LEFT COMMON FEMORAL AUGMENT: NORMAL
BH CV LOWER VASCULAR LEFT COMMON FEMORAL COMPRESS: NORMAL
BH CV LOWER VASCULAR LEFT COMMON FEMORAL PHASIC: NORMAL
BH CV LOWER VASCULAR LEFT COMMON FEMORAL SPONT: NORMAL
BH CV LOWER VASCULAR LEFT DISTAL FEMORAL AUGMENT: NORMAL
BH CV LOWER VASCULAR LEFT DISTAL FEMORAL COMPRESS: NORMAL
BH CV LOWER VASCULAR LEFT DISTAL FEMORAL PHASIC: NORMAL
BH CV LOWER VASCULAR LEFT DISTAL FEMORAL SPONT: NORMAL
BH CV LOWER VASCULAR LEFT GASTRONEMIUS COMPRESS: NORMAL
BH CV LOWER VASCULAR LEFT GREATER SAPH AK COMPRESS: NORMAL
BH CV LOWER VASCULAR LEFT GREATER SAPH BK COMPRESS: NORMAL
BH CV LOWER VASCULAR LEFT LESSER SAPH COMPRESS: NORMAL
BH CV LOWER VASCULAR LEFT MID FEMORAL AUGMENT: NORMAL
BH CV LOWER VASCULAR LEFT MID FEMORAL COMPRESS: NORMAL
BH CV LOWER VASCULAR LEFT MID FEMORAL PHASIC: NORMAL
BH CV LOWER VASCULAR LEFT MID FEMORAL SPONT: NORMAL
BH CV LOWER VASCULAR LEFT PERONEAL COMPRESS: NORMAL
BH CV LOWER VASCULAR LEFT POPLITEAL AUGMENT: NORMAL
BH CV LOWER VASCULAR LEFT POPLITEAL COMPRESS: NORMAL
BH CV LOWER VASCULAR LEFT POPLITEAL PHASIC: NORMAL
BH CV LOWER VASCULAR LEFT POPLITEAL SPONT: NORMAL
BH CV LOWER VASCULAR LEFT POSTERIOR TIBIAL COMPRESS: NORMAL
BH CV LOWER VASCULAR LEFT PROFUNDA FEMORAL AUGMENT: NORMAL
BH CV LOWER VASCULAR LEFT PROFUNDA FEMORAL COMPRESS: NORMAL
BH CV LOWER VASCULAR LEFT PROFUNDA FEMORAL PHASIC: NORMAL
BH CV LOWER VASCULAR LEFT PROFUNDA FEMORAL SPONT: NORMAL
BH CV LOWER VASCULAR LEFT PROXIMAL FEMORAL AUGMENT: NORMAL
BH CV LOWER VASCULAR LEFT PROXIMAL FEMORAL COMPRESS: NORMAL
BH CV LOWER VASCULAR LEFT PROXIMAL FEMORAL PHASIC: NORMAL
BH CV LOWER VASCULAR LEFT PROXIMAL FEMORAL SPONT: NORMAL
BH CV LOWER VASCULAR LEFT SAPHENOFEMORAL JUNCTION AUGMENT: NORMAL
BH CV LOWER VASCULAR LEFT SAPHENOFEMORAL JUNCTION COMPRESS: NORMAL
BH CV LOWER VASCULAR LEFT SAPHENOFEMORAL JUNCTION PHASIC: NORMAL
BH CV LOWER VASCULAR LEFT SAPHENOFEMORAL JUNCTION SPONT: NORMAL
BH CV LOWER VASCULAR RIGHT COMMON FEMORAL AUGMENT: NORMAL
BH CV LOWER VASCULAR RIGHT COMMON FEMORAL COMPRESS: NORMAL
BH CV LOWER VASCULAR RIGHT COMMON FEMORAL PHASIC: NORMAL
BH CV LOWER VASCULAR RIGHT COMMON FEMORAL SPONT: NORMAL
BH CV LOWER VASCULAR RIGHT SAPHENOFEMORAL JUNCTION AUGMENT: NORMAL
BH CV LOWER VASCULAR RIGHT SAPHENOFEMORAL JUNCTION COMPRESS: NORMAL
BH CV LOWER VASCULAR RIGHT SAPHENOFEMORAL JUNCTION PHASIC: NORMAL
BH CV LOWER VASCULAR RIGHT SAPHENOFEMORAL JUNCTION SPONT: NORMAL

## 2019-12-12 PROCEDURE — 93971 EXTREMITY STUDY: CPT | Performed by: INTERNAL MEDICINE

## 2019-12-12 PROCEDURE — 99214 OFFICE O/P EST MOD 30 MIN: CPT | Performed by: NURSE PRACTITIONER

## 2019-12-12 PROCEDURE — 93971 EXTREMITY STUDY: CPT

## 2019-12-12 RX ORDER — CEPHALEXIN 500 MG/1
500 CAPSULE ORAL 3 TIMES DAILY
Qty: 30 CAPSULE | Refills: 0 | Status: SHIPPED | OUTPATIENT
Start: 2019-12-12 | End: 2019-12-20
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
## 2019-12-13 ENCOUNTER — TELEPHONE (OUTPATIENT)
Dept: INTERNAL MEDICINE | Facility: CLINIC | Age: 74
End: 2019-12-13

## 2019-12-13 NOTE — TELEPHONE ENCOUNTER
----- Message from ANAHY Serrano sent at 12/13/2019  9:32 AM EST -----  Venous duplex was NEG for DVT

## 2019-12-13 NOTE — TELEPHONE ENCOUNTER
"Patient was calling to clarify why she was prescribed an antibiotic yesterday even though \"I had no external wounds that could get infected\" Patient read the information given with the medication at the pharmacy but also stated she read online on google it was an antibiotic but she stated I don't know i'm not a doctor patient asked to have the nurse give her a call back [phone] Patient only took dose last night but hasn't taken any today please advise   "
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
## 2019-12-19 ENCOUNTER — TELEPHONE (OUTPATIENT)
Dept: INTERNAL MEDICINE | Facility: CLINIC | Age: 74
End: 2019-12-19
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
## 2019-12-20 ENCOUNTER — OFFICE VISIT (OUTPATIENT)
Dept: INTERNAL MEDICINE | Facility: CLINIC | Age: 74
End: 2019-12-20

## 2019-12-20 VITALS
SYSTOLIC BLOOD PRESSURE: 158 MMHG | DIASTOLIC BLOOD PRESSURE: 86 MMHG | BODY MASS INDEX: 32.98 KG/M2 | TEMPERATURE: 97.1 F | WEIGHT: 193.2 LBS | HEIGHT: 64 IN | HEART RATE: 62 BPM | OXYGEN SATURATION: 98 %

## 2019-12-20 DIAGNOSIS — N63.15 BREAST LUMP ON RIGHT SIDE AT 3 O'CLOCK POSITION: ICD-10-CM

## 2019-12-20 DIAGNOSIS — S30.1XXD CONTUSION OF ABDOMINAL WALL, SUBSEQUENT ENCOUNTER: ICD-10-CM

## 2019-12-20 DIAGNOSIS — N64.9 DISORDER OF BREAST, UNSPECIFIED: ICD-10-CM

## 2019-12-20 DIAGNOSIS — S20.01XD CONTUSION OF RIGHT BREAST, SUBSEQUENT ENCOUNTER: ICD-10-CM
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
## 2019-12-20 DIAGNOSIS — Z79.899 ENCOUNTER FOR LONG-TERM (CURRENT) USE OF MEDICATIONS: ICD-10-CM

## 2019-12-20 LAB
AMPHET+METHAMPHET UR QL: NEGATIVE
AMPHETAMINES UR QL: NEGATIVE
BARBITURATES UR QL SCN: NEGATIVE
BENZODIAZ UR QL SCN: NEGATIVE
BUPRENORPHINE SERPL-MCNC: NEGATIVE NG/ML
CANNABINOIDS SERPL QL: NEGATIVE
COCAINE UR QL: NEGATIVE
METHADONE UR QL SCN: NEGATIVE
OPIATES UR QL: NEGATIVE
OXYCODONE UR QL SCN: NEGATIVE
PCP UR QL SCN: NEGATIVE
PROPOXYPH UR QL: NEGATIVE
TRICYCLICS UR QL SCN: NEGATIVE

## 2019-12-20 PROCEDURE — 80307 DRUG TEST PRSMV CHEM ANLYZR: CPT | Performed by: FAMILY MEDICINE

## 2019-12-20 PROCEDURE — 99213 OFFICE O/P EST LOW 20 MIN: CPT | Performed by: FAMILY MEDICINE

## 2019-12-20 RX ORDER — TRAMADOL HYDROCHLORIDE 50 MG/1
50 TABLET ORAL EVERY 6 HOURS PRN
Qty: 30 TABLET | Refills: 0 | Status: SHIPPED | OUTPATIENT
Start: 2019-12-20 | End: 2020-01-06
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
## 2020-01-03 RX ORDER — PRAVASTATIN SODIUM 40 MG
TABLET ORAL
Qty: 30 TABLET | Refills: 0 | OUTPATIENT
Start: 2020-01-03

## 2020-01-03 RX ORDER — METOPROLOL SUCCINATE 50 MG/1
TABLET, EXTENDED RELEASE ORAL
Qty: 30 TABLET | Refills: 0 | OUTPATIENT
Start: 2020-01-03
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
## 2020-01-06 ENCOUNTER — OFFICE VISIT (OUTPATIENT)
Dept: INTERNAL MEDICINE | Facility: CLINIC | Age: 75
End: 2020-01-06

## 2020-01-06 VITALS
HEART RATE: 57 BPM | OXYGEN SATURATION: 98 % | DIASTOLIC BLOOD PRESSURE: 78 MMHG | WEIGHT: 186.8 LBS | SYSTOLIC BLOOD PRESSURE: 134 MMHG | BODY MASS INDEX: 31.89 KG/M2 | TEMPERATURE: 98.6 F | HEIGHT: 64 IN

## 2020-01-06 DIAGNOSIS — E78.00 PURE HYPERCHOLESTEROLEMIA: Primary | ICD-10-CM

## 2020-01-06 DIAGNOSIS — E53.8 B12 DEFICIENCY: ICD-10-CM

## 2020-01-06 DIAGNOSIS — I10 ESSENTIAL HYPERTENSION: ICD-10-CM

## 2020-01-06 DIAGNOSIS — Z78.0 POSTMENOPAUSAL: ICD-10-CM

## 2020-01-06 LAB
ALBUMIN SERPL-MCNC: 4.3 G/DL (ref 3.5–5.2)
ALBUMIN/GLOB SERPL: 1.2 G/DL
ALP SERPL-CCNC: 99 U/L (ref 39–117)
ALT SERPL W P-5'-P-CCNC: 15 U/L (ref 1–33)
ANION GAP SERPL CALCULATED.3IONS-SCNC: 12.3 MMOL/L (ref 5–15)
AST SERPL-CCNC: 21 U/L (ref 1–32)
BILIRUB SERPL-MCNC: 0.5 MG/DL (ref 0.2–1.2)
BUN BLD-MCNC: 17 MG/DL (ref 8–23)
BUN/CREAT SERPL: 24.6 (ref 7–25)
CALCIUM SPEC-SCNC: 9.9 MG/DL (ref 8.6–10.5)
CHLORIDE SERPL-SCNC: 103 MMOL/L (ref 98–107)
CHOLEST SERPL-MCNC: 186 MG/DL (ref 0–200)
CO2 SERPL-SCNC: 25.7 MMOL/L (ref 22–29)
CREAT BLD-MCNC: 0.69 MG/DL (ref 0.57–1)
GFR SERPL CREATININE-BSD FRML MDRD: 83 ML/MIN/1.73
GLOBULIN UR ELPH-MCNC: 3.6 GM/DL
GLUCOSE BLD-MCNC: 102 MG/DL (ref 65–99)
HDLC SERPL-MCNC: 72 MG/DL (ref 40–60)
LDLC SERPL CALC-MCNC: 100 MG/DL (ref 0–100)
LDLC/HDLC SERPL: 1.39 {RATIO}
POTASSIUM BLD-SCNC: 4.6 MMOL/L (ref 3.5–5.2)
PROT SERPL-MCNC: 7.9 G/DL (ref 6–8.5)
SODIUM BLD-SCNC: 141 MMOL/L (ref 136–145)
TRIGL SERPL-MCNC: 68 MG/DL (ref 0–150)
VIT B12 BLD-MCNC: 445 PG/ML (ref 211–946)
VLDLC SERPL-MCNC: 13.6 MG/DL (ref 5–40)

## 2020-01-06 PROCEDURE — 80061 LIPID PANEL: CPT | Performed by: INTERNAL MEDICINE

## 2020-01-06 PROCEDURE — 80053 COMPREHEN METABOLIC PANEL: CPT | Performed by: INTERNAL MEDICINE

## 2020-01-06 PROCEDURE — 82607 VITAMIN B-12: CPT | Performed by: INTERNAL MEDICINE

## 2020-01-06 PROCEDURE — 99214 OFFICE O/P EST MOD 30 MIN: CPT | Performed by: INTERNAL MEDICINE

## 2020-01-06 RX ORDER — METOPROLOL SUCCINATE 50 MG/1
50 TABLET, EXTENDED RELEASE ORAL DAILY
Qty: 90 TABLET | Refills: 1 | Status: CANCELLED | OUTPATIENT
Start: 2020-01-06

## 2020-01-06 RX ORDER — PRAVASTATIN SODIUM 40 MG
40 TABLET ORAL DAILY
Qty: 90 TABLET | Refills: 1 | Status: CANCELLED | OUTPATIENT
Start: 2020-01-06

## 2020-01-07 RX ORDER — METOPROLOL SUCCINATE 50 MG/1
50 TABLET, EXTENDED RELEASE ORAL DAILY
Qty: 90 TABLET | Refills: 1 | Status: SHIPPED | OUTPATIENT
Start: 2020-01-07 | End: 2020-07-01

## 2020-01-07 RX ORDER — PRAVASTATIN SODIUM 40 MG
40 TABLET ORAL DAILY
Qty: 90 TABLET | Refills: 1 | Status: SHIPPED | OUTPATIENT
Start: 2020-01-07 | End: 2020-05-04 | Stop reason: SDUPTHER

## 2020-01-13 ENCOUNTER — TELEPHONE (OUTPATIENT)
Dept: INTERNAL MEDICINE | Facility: CLINIC | Age: 75
End: 2020-01-13

## 2020-01-13 NOTE — TELEPHONE ENCOUNTER
Pt called stating she hasn't been called about an appt for the bone density test yet so she was calling to check the status of that.

## 2020-01-27 ENCOUNTER — APPOINTMENT (OUTPATIENT)
Dept: MAMMOGRAPHY | Facility: HOSPITAL | Age: 75
End: 2020-01-27

## 2020-01-27 ENCOUNTER — HOSPITAL ENCOUNTER (OUTPATIENT)
Dept: MAMMOGRAPHY | Facility: HOSPITAL | Age: 75
Discharge: HOME | End: 2020-01-27
Admitting: FAMILY MEDICINE

## 2020-01-27 DIAGNOSIS — N64.9 DISORDER OF BREAST, UNSPECIFIED: ICD-10-CM

## 2020-01-27 DIAGNOSIS — S20.01XD CONTUSION OF RIGHT BREAST, SUBSEQUENT ENCOUNTER: ICD-10-CM

## 2020-01-27 DIAGNOSIS — N63.15 BREAST LUMP ON RIGHT SIDE AT 3 O'CLOCK POSITION: ICD-10-CM

## 2020-01-27 PROCEDURE — 77066 DX MAMMO INCL CAD BI: CPT | Performed by: RADIOLOGY

## 2020-01-27 PROCEDURE — G0279 TOMOSYNTHESIS, MAMMO: HCPCS

## 2020-01-27 PROCEDURE — G0279 TOMOSYNTHESIS, MAMMO: HCPCS | Performed by: RADIOLOGY

## 2020-01-27 PROCEDURE — 77066 DX MAMMO INCL CAD BI: CPT

## 2020-03-02 ENCOUNTER — TELEPHONE (OUTPATIENT)
Dept: ORTHOPEDIC SURGERY | Facility: CLINIC | Age: 75
End: 2020-03-02

## 2020-04-02 ENCOUNTER — TELEPHONE (OUTPATIENT)
Dept: INTERNAL MEDICINE | Facility: CLINIC | Age: 75
End: 2020-04-02

## 2020-04-02 NOTE — TELEPHONE ENCOUNTER
PHARMACY CALLED TO REQUEST CHANGE IN RX pravastatin (PRAVACHOL) 40 MG tablet WHICH IS ON BACK ORDER AND REQUEST TO HAVE CHANGED TO 80MG AND HAVE PT CUT IN HALF. PHARMACY WILL JUST NEED A NEW RX.    PLEASE ADVISE.  CALL BACK: [phone]       [facility] [place], [place] - [address]

## 2020-04-03 NOTE — TELEPHONE ENCOUNTER
LVM with [facility] to have pravachol changed from 40mg daily to 80mg to cut in half and take once daily with qty of 15.

## 2020-05-04 ENCOUNTER — TELEPHONE (OUTPATIENT)
Dept: INTERNAL MEDICINE | Facility: CLINIC | Age: 75
End: 2020-05-04

## 2020-05-04 RX ORDER — PRAVASTATIN SODIUM 40 MG
40 TABLET ORAL DAILY
Qty: 90 TABLET | Refills: 0 | Status: SHIPPED | OUTPATIENT
Start: 2020-05-04 | End: 2020-07-14 | Stop reason: SDUPTHER

## 2020-05-04 RX ORDER — PRAVASTATIN SODIUM 80 MG/1
TABLET ORAL
Qty: 15 TABLET | Refills: 0 | OUTPATIENT
Start: 2020-05-04

## 2020-05-04 NOTE — TELEPHONE ENCOUNTER
Pt needs a new rx for pravastatin 40 mg when it went on back order this rx was deactivated and the pharmacy can not go back and retrieve it

## 2020-05-05 ENCOUNTER — APPOINTMENT (OUTPATIENT)
Dept: BONE DENSITY | Facility: HOSPITAL | Age: 75
End: 2020-05-05

## 2020-07-01 RX ORDER — FLUTICASONE PROPIONATE 50 MCG
SPRAY, SUSPENSION (ML) NASAL
Qty: 1 BOTTLE | Refills: 11 | Status: SHIPPED | OUTPATIENT
Start: 2020-07-01 | End: 2021-07-14 | Stop reason: SDUPTHER

## 2020-07-01 RX ORDER — METOPROLOL SUCCINATE 50 MG/1
TABLET, EXTENDED RELEASE ORAL
Qty: 90 TABLET | Refills: 0 | Status: SHIPPED | OUTPATIENT
Start: 2020-07-01 | End: 2020-09-29

## 2020-07-06 ENCOUNTER — TELEPHONE (OUTPATIENT)
Dept: INTERNAL MEDICINE | Facility: CLINIC | Age: 75
End: 2020-07-06

## 2020-07-06 NOTE — TELEPHONE ENCOUNTER
PT STATES THAT SHE  HAS JURY DUTY IN AUGUST.    PT IS REQUESTING A LETTER TO EXCUSE HER FROM JURY DUTY.    PT STATES SHE HAS SEVERAL HEALTH ISSUES AND DOES NOT WANT TO BE EXPOSED.    PLEASE ADVISE  [phone]

## 2020-07-12 NOTE — PROGRESS NOTES
History of Present Illness     Here for medicare wellness exam. No hospitalizations in last year and pt feels health is stable    PHQ-2-0  Falls:2 falls recently - one at [place] and one recently at beach; uses cane always  Memory: no concerns  Living will: does not have  Specialists:  [place] [facility] Rodrig[facility]    Exercise: was going to the [employer] but is close now because of COVID-19; does some stretches her own every day.  Unable to walk for distances because of her falls  Diet: ca/D daily,     htn - on metoprolol. She occasionally checks and usually gets a good reading    Hyperlipidemia - on pravastatin.  She is fasting today    Dr Ortiz had done an esophageal dilation and pt has done well generall, but did have one day earlier this week where she felt some discomfort in her esophagus. She had taken ibuprofen on an empty stomach, and felt some pain in for 10-4seconds    Current Outpatient Medications on File Prior to Visit   Medication Sig Dispense Refill   • Calcium Carbonate-Vitamin D (CALCIUM-D PO) Take  by mouth.     • fluticasone (FLONASE) 50 MCG/ACT nasal spray SPRAY TWO SPRAYS IN EACH NOSTRIL DAILY AS DIRECTED BY PRESCRIBER 1 bottle 11   • metoprolol succinate XL (TOPROL-XL) 50 MG 24 hr tablet TAKE ONE TABLET BY MOUTH DAILY 90 tablet 0   • omeprazole (priLOSEC) 20 MG capsule TAKE ONE CAPSULE BY MOUTH EVERY MORNING 90 capsule 4   • pravastatin (PRAVACHOL) 40 MG tablet Take 1 tablet by mouth Daily. 90 tablet 0   • [DISCONTINUED] aspirin 81 MG EC tablet Take 81 mg by mouth Daily.       No current facility-administered medications on file prior to visit.        The following portions of the patient's history were reviewed and updated as appropriate: allergies, current medications, past family history, past medical history, past social history, past surgical history and problem list.    Review of Systems   Constitutional: Positive for unexpected weight  loss. Negative for activity change, appetite change, fever and unexpected weight gain.   HENT: Negative.    Eyes: Negative.    Respiratory: Negative for shortness of breath and wheezing.    Cardiovascular: Negative for chest pain, palpitations and leg swelling.   Gastrointestinal: Negative.    Endocrine: Negative.    Genitourinary: Positive for urinary incontinence (urge). Negative for difficulty urinating and dysuria.   Musculoskeletal: Positive for back pain and gait problem.   Skin: Negative.    Allergic/Immunologic: Negative for immunocompromised state.   Neurological: Negative for seizures, speech difficulty, memory problem and confusion.        Poor balance x years   Hematological: Does not bruise/bleed easily.   Psychiatric/Behavioral: Negative for agitation.         Objective    Vitals:    07/13/20 1018   BP: 122/68   Pulse: 70   Temp: 97.7 °F (36.5 °C)   SpO2: 98%     Physical Exam   Physical Exam   Constitutional: She is oriented to person, place, and time. She appears well-developed and well-nourished. No distress.   HENT:   Head: Normocephalic and atraumatic.   Nose: Nose normal.   Mouth/Throat: Oropharynx is clear and moist. No oropharyngeal exudate.   B ears w/ cerumen   Eyes: Pupils are equal, round, and reactive to light. Conjunctivae and EOM are normal. Right eye exhibits no discharge. Left eye exhibits no discharge. No scleral icterus.   Neck: Normal range of motion. Neck supple. No thyromegaly present.   Cardiovascular: Normal rate, regular rhythm, normal heart sounds and intact distal pulses. Exam reveals no gallop and no friction rub.   No murmur heard.  Pulmonary/Chest: Effort normal and breath sounds normal. No respiratory distress. She has no wheezes. She has no rales.   Abdominal: Soft. Bowel sounds are normal. She exhibits no distension and no mass. There is no tenderness. There is no rebound and no guarding.   Musculoskeletal: Normal range of motion. She exhibits no edema or deformity.    Lymphadenopathy:     She has no cervical adenopathy.   Neurological: She is alert and oriented to person, place, and time. She displays normal reflexes.   Gait-slow. Uses cane   Skin: Skin is warm and dry. No rash noted. She is not diaphoretic. No erythema. No pallor.   Psychiatric: She has a normal mood and affect. Her behavior is normal. Judgment and thought content normal.   Nursing note and vitals reviewed.         Assessment/Plan   Cindy was seen today for medicare wellness-subsequent, annual exam, hypertension, hyperlipidemia and med refill.    Diagnoses and all orders for this visit:    Encounter for Medicare annual wellness exam/Routine general medical examination at a health care facility  -     POC Urinalysis Dipstick, Automated  Regular exercise/healthy diet. BSE q month. Sunscreen use encouraged. caclium intake reviewed.  Living will - form given  Ty due 1/21  Colon due now- will schedule  Pneumovax- had  prevnar- had  DT - allergic to this  DEXA scheduled for next month  Shingles- shingrix discussed -  can check at pharmacy since we do not have   Does not need paps since age 65 or older and has had normal paps in past    Pure hypercholesterolemia-check levels today  -     CBC & Differential; Future  -     Comprehensive Metabolic Panel; Future  -     TSH; Future  -     Lipid Panel; Future    Essential hypertension-good control  -     CBC & Differential; Future  -     Comprehensive Metabolic Panel; Future    B12 deficiency-recheck today  -     Vitamin B12; Future    Screen for colon cancer-we will schedule colonoscopy    Bacteriuria-asymptomatic  -     Urine Culture - Urine, Urine, Clean Catch; Future    Poor balance/frequent falls  Order for PT given for balance

## 2020-07-13 ENCOUNTER — LAB (OUTPATIENT)
Dept: LAB | Facility: HOSPITAL | Age: 75
End: 2020-07-13

## 2020-07-13 ENCOUNTER — OFFICE VISIT (OUTPATIENT)
Dept: INTERNAL MEDICINE | Facility: CLINIC | Age: 75
End: 2020-07-13

## 2020-07-13 VITALS
OXYGEN SATURATION: 98 % | SYSTOLIC BLOOD PRESSURE: 122 MMHG | HEART RATE: 70 BPM | WEIGHT: 183.6 LBS | BODY MASS INDEX: 30.59 KG/M2 | DIASTOLIC BLOOD PRESSURE: 68 MMHG | HEIGHT: 65 IN | TEMPERATURE: 97.7 F

## 2020-07-13 DIAGNOSIS — E53.8 B12 DEFICIENCY: ICD-10-CM

## 2020-07-13 DIAGNOSIS — R26.89 POOR BALANCE: ICD-10-CM

## 2020-07-13 DIAGNOSIS — R82.71 BACTERIURIA: ICD-10-CM

## 2020-07-13 DIAGNOSIS — Z00.00 ROUTINE GENERAL MEDICAL EXAMINATION AT A HEALTH CARE FACILITY: ICD-10-CM

## 2020-07-13 DIAGNOSIS — E78.00 PURE HYPERCHOLESTEROLEMIA: ICD-10-CM

## 2020-07-13 DIAGNOSIS — Z12.11 SCREEN FOR COLON CANCER: ICD-10-CM

## 2020-07-13 DIAGNOSIS — Z00.00 ENCOUNTER FOR MEDICARE ANNUAL WELLNESS EXAM: Primary | ICD-10-CM

## 2020-07-13 DIAGNOSIS — I10 ESSENTIAL HYPERTENSION: ICD-10-CM

## 2020-07-13 LAB
ALBUMIN SERPL-MCNC: 4.4 G/DL (ref 3.5–5.2)
ALBUMIN/GLOB SERPL: 1.4 G/DL
ALP SERPL-CCNC: 77 U/L (ref 39–117)
ALT SERPL W P-5'-P-CCNC: 21 U/L (ref 1–33)
ANION GAP SERPL CALCULATED.3IONS-SCNC: 12 MMOL/L (ref 5–15)
AST SERPL-CCNC: 25 U/L (ref 1–32)
BASOPHILS # BLD AUTO: 0.04 10*3/MM3 (ref 0–0.2)
BASOPHILS NFR BLD AUTO: 0.9 % (ref 0–1.5)
BILIRUB BLD-MCNC: ABNORMAL MG/DL
BILIRUB SERPL-MCNC: 0.4 MG/DL (ref 0–1.2)
BUN SERPL-MCNC: 20 MG/DL (ref 8–23)
BUN/CREAT SERPL: 24.4 (ref 7–25)
CALCIUM SPEC-SCNC: 10.5 MG/DL (ref 8.6–10.5)
CHLORIDE SERPL-SCNC: 103 MMOL/L (ref 98–107)
CHOLEST SERPL-MCNC: 190 MG/DL (ref 0–200)
CLARITY, POC: CLEAR
CO2 SERPL-SCNC: 27 MMOL/L (ref 22–29)
COLOR UR: ABNORMAL
CREAT SERPL-MCNC: 0.82 MG/DL (ref 0.57–1)
DEPRECATED RDW RBC AUTO: 43.7 FL (ref 37–54)
EOSINOPHIL # BLD AUTO: 0.05 10*3/MM3 (ref 0–0.4)
EOSINOPHIL NFR BLD AUTO: 1.1 % (ref 0.3–6.2)
ERYTHROCYTE [DISTWIDTH] IN BLOOD BY AUTOMATED COUNT: 13.4 % (ref 12.3–15.4)
GFR SERPL CREATININE-BSD FRML MDRD: 68 ML/MIN/1.73
GLOBULIN UR ELPH-MCNC: 3.2 GM/DL
GLUCOSE SERPL-MCNC: 117 MG/DL (ref 65–99)
GLUCOSE UR STRIP-MCNC: NEGATIVE MG/DL
HCT VFR BLD AUTO: 42 % (ref 34–46.6)
HDLC SERPL-MCNC: 70 MG/DL (ref 40–60)
HGB BLD-MCNC: 14.2 G/DL (ref 12–15.9)
IMM GRANULOCYTES # BLD AUTO: 0.02 10*3/MM3 (ref 0–0.05)
IMM GRANULOCYTES NFR BLD AUTO: 0.4 % (ref 0–0.5)
KETONES UR QL: ABNORMAL
LDLC SERPL CALC-MCNC: 105 MG/DL (ref 0–100)
LDLC/HDLC SERPL: 1.5 {RATIO}
LEUKOCYTE EST, POC: ABNORMAL
LYMPHOCYTES # BLD AUTO: 1.4 10*3/MM3 (ref 0.7–3.1)
LYMPHOCYTES NFR BLD AUTO: 30.4 % (ref 19.6–45.3)
MCH RBC QN AUTO: 30.3 PG (ref 26.6–33)
MCHC RBC AUTO-ENTMCNC: 33.8 G/DL (ref 31.5–35.7)
MCV RBC AUTO: 89.6 FL (ref 79–97)
MONOCYTES # BLD AUTO: 0.49 10*3/MM3 (ref 0.1–0.9)
MONOCYTES NFR BLD AUTO: 10.7 % (ref 5–12)
NEUTROPHILS NFR BLD AUTO: 2.6 10*3/MM3 (ref 1.7–7)
NEUTROPHILS NFR BLD AUTO: 56.5 % (ref 42.7–76)
NITRITE UR-MCNC: POSITIVE MG/ML
NRBC BLD AUTO-RTO: 0 /100 WBC (ref 0–0.2)
PH UR: 5.5 [PH] (ref 5–8)
PLATELET # BLD AUTO: 340 10*3/MM3 (ref 140–450)
PMV BLD AUTO: 10.8 FL (ref 6–12)
POTASSIUM SERPL-SCNC: 4.5 MMOL/L (ref 3.5–5.2)
PROT SERPL-MCNC: 7.6 G/DL (ref 6–8.5)
PROT UR STRIP-MCNC: ABNORMAL MG/DL
RBC # BLD AUTO: 4.69 10*6/MM3 (ref 3.77–5.28)
RBC # UR STRIP: NEGATIVE /UL
SODIUM SERPL-SCNC: 142 MMOL/L (ref 136–145)
SP GR UR: 1.03 (ref 1–1.03)
TRIGL SERPL-MCNC: 75 MG/DL (ref 0–150)
UROBILINOGEN UR QL: NORMAL
VLDLC SERPL-MCNC: 15 MG/DL (ref 5–40)
WBC # BLD AUTO: 4.6 10*3/MM3 (ref 3.4–10.8)

## 2020-07-13 PROCEDURE — 80061 LIPID PANEL: CPT

## 2020-07-13 PROCEDURE — 87077 CULTURE AEROBIC IDENTIFY: CPT

## 2020-07-13 PROCEDURE — 87086 URINE CULTURE/COLONY COUNT: CPT

## 2020-07-13 PROCEDURE — 87186 SC STD MICRODIL/AGAR DIL: CPT

## 2020-07-13 PROCEDURE — 80053 COMPREHEN METABOLIC PANEL: CPT

## 2020-07-13 PROCEDURE — 85025 COMPLETE CBC W/AUTO DIFF WBC: CPT

## 2020-07-13 PROCEDURE — 82607 VITAMIN B-12: CPT

## 2020-07-13 PROCEDURE — G0439 PPPS, SUBSEQ VISIT: HCPCS | Performed by: INTERNAL MEDICINE

## 2020-07-13 PROCEDURE — 81003 URINALYSIS AUTO W/O SCOPE: CPT | Performed by: INTERNAL MEDICINE

## 2020-07-13 PROCEDURE — 84443 ASSAY THYROID STIM HORMONE: CPT

## 2020-07-13 RX ORDER — PRAVASTATIN SODIUM 40 MG
40 TABLET ORAL DAILY
Qty: 90 TABLET | Refills: 0 | Status: CANCELLED | OUTPATIENT
Start: 2020-07-13

## 2020-07-14 LAB
TSH SERPL DL<=0.05 MIU/L-ACNC: 1.12 UIU/ML (ref 0.27–4.2)
VIT B12 BLD-MCNC: 432 PG/ML (ref 211–946)

## 2020-07-14 RX ORDER — PRAVASTATIN SODIUM 40 MG
40 TABLET ORAL DAILY
Qty: 90 TABLET | Refills: 1 | Status: SHIPPED | OUTPATIENT
Start: 2020-07-14 | End: 2020-11-25

## 2020-07-15 ENCOUNTER — TELEPHONE (OUTPATIENT)
Dept: INTERNAL MEDICINE | Facility: CLINIC | Age: 75
End: 2020-07-15

## 2020-07-15 LAB — BACTERIA SPEC AEROBE CULT: ABNORMAL

## 2020-07-15 NOTE — TELEPHONE ENCOUNTER
----- Message from Janey Adan MD sent at 7/15/2020  7:47 AM EDT -----  Can you let her know, her urine is growing some bacteria.  If she having any symptoms such as burning or going frequently?  If so, I can send in an antibiotic.  If she is not having any symptoms, we do not have to treat it.  I will send her a letter with the rest of her labs

## 2020-07-15 NOTE — TELEPHONE ENCOUNTER
PN and she states she has always had incontinence.  She is not having any other symptoms at this time.

## 2020-08-03 DIAGNOSIS — Z12.11 SCREENING FOR COLON CANCER: Primary | ICD-10-CM

## 2020-08-17 ENCOUNTER — APPOINTMENT (OUTPATIENT)
Dept: PREADMISSION TESTING | Facility: HOSPITAL | Age: 75
End: 2020-08-17

## 2020-08-17 PROCEDURE — U0002 COVID-19 LAB TEST NON-CDC: HCPCS

## 2020-08-17 PROCEDURE — U0004 COV-19 TEST NON-CDC HGH THRU: HCPCS

## 2020-08-17 PROCEDURE — C9803 HOPD COVID-19 SPEC COLLECT: HCPCS

## 2020-08-18 ENCOUNTER — APPOINTMENT (OUTPATIENT)
Dept: BONE DENSITY | Facility: HOSPITAL | Age: 75
End: 2020-08-18

## 2020-08-18 LAB
REF LAB TEST METHOD: NORMAL
SARS-COV-2 RNA RESP QL NAA+PROBE: NOT DETECTED

## 2020-08-20 ENCOUNTER — OUTSIDE FACILITY SERVICE (OUTPATIENT)
Dept: GASTROENTEROLOGY | Facility: CLINIC | Age: 75
End: 2020-08-20

## 2020-08-20 PROCEDURE — G0500 MOD SEDAT ENDO SERVICE >5YRS: HCPCS | Performed by: INTERNAL MEDICINE

## 2020-08-20 PROCEDURE — G0105 COLORECTAL SCRN; HI RISK IND: HCPCS | Performed by: INTERNAL MEDICINE

## 2020-09-28 RX ORDER — PRAVASTATIN SODIUM 40 MG
TABLET ORAL
Qty: 90 TABLET | Refills: 0 | OUTPATIENT
Start: 2020-09-28

## 2020-09-29 RX ORDER — METOPROLOL SUCCINATE 50 MG/1
TABLET, EXTENDED RELEASE ORAL
Qty: 90 TABLET | Refills: 1 | Status: SHIPPED | OUTPATIENT
Start: 2020-09-29 | End: 2021-04-20

## 2020-10-07 ENCOUNTER — TELEPHONE (OUTPATIENT)
Dept: INTERNAL MEDICINE | Facility: CLINIC | Age: 75
End: 2020-10-07

## 2020-10-07 NOTE — TELEPHONE ENCOUNTER
I gave her the number to [facility] and she can reschedule her appointment.  I asked her to let me know if she is not able to schedule.

## 2020-10-07 NOTE — TELEPHONE ENCOUNTER
PATIENT STATES SHE WAS SCHEDULED FOR A BONE DENSITY SCAN AND HAD TO CANCEL HER APPOINTMENT DUE TO QUARANTINING FOR ANOTHER PROCEDURE. PATIENT WOULD LIKE TO RESCHEDULE THIS AND WOULD LIKE TO E CONTACTED ABOUT RESCHEDULING THE APPOINTMENT.    PATIENT CALL BACK   [phone]

## 2020-11-25 RX ORDER — PRAVASTATIN SODIUM 40 MG
TABLET ORAL
Qty: 90 TABLET | Refills: 0 | Status: SHIPPED | OUTPATIENT
Start: 2020-11-25 | End: 2021-01-16 | Stop reason: SDUPTHER

## 2020-12-15 ENCOUNTER — HOSPITAL ENCOUNTER (OUTPATIENT)
Dept: BONE DENSITY | Facility: HOSPITAL | Age: 75
Discharge: HOME | End: 2020-12-15
Admitting: INTERNAL MEDICINE

## 2020-12-15 DIAGNOSIS — Z78.0 POSTMENOPAUSAL: ICD-10-CM

## 2020-12-15 PROCEDURE — 77080 DXA BONE DENSITY AXIAL: CPT

## 2020-12-19 PROBLEM — M85.859 OSTEOPENIA OF NECK OF FEMUR: Status: ACTIVE | Noted: 2020-12-19

## 2021-01-05 NOTE — TELEPHONE ENCOUNTER
Caller: Samara Phillips    Relationship: Self    Best call back number: [phone]    Medication needed:   Requested Prescriptions     Pending Prescriptions Disp Refills   • omeprazole (priLOSEC) 20 MG capsule 90 capsule 4     Sig: Take 1 capsule by mouth Every Morning.       When do you need the refill by: 1/5/21    What details did the patient provide when requesting the medication:     Does the patient have less than a 3 day supply:  [x] Yes  [] No    What is the patient's [facility] [facility] [address] [facility] [phone] [facility] [phone] FX

## 2021-01-06 RX ORDER — OMEPRAZOLE 20 MG/1
20 CAPSULE, DELAYED RELEASE ORAL EVERY MORNING
Qty: 90 CAPSULE | Refills: 3 | Status: SHIPPED | OUTPATIENT
Start: 2021-01-06 | End: 2021-12-21

## 2021-01-11 ENCOUNTER — OFFICE VISIT (OUTPATIENT)
Dept: INTERNAL MEDICINE | Facility: CLINIC | Age: 76
End: 2021-01-11

## 2021-01-11 ENCOUNTER — LAB (OUTPATIENT)
Dept: LAB | Facility: HOSPITAL | Age: 76
End: 2021-01-11

## 2021-01-11 VITALS
HEIGHT: 65 IN | WEIGHT: 181.8 LBS | DIASTOLIC BLOOD PRESSURE: 74 MMHG | HEART RATE: 54 BPM | TEMPERATURE: 97.1 F | SYSTOLIC BLOOD PRESSURE: 122 MMHG | BODY MASS INDEX: 30.29 KG/M2 | OXYGEN SATURATION: 97 %

## 2021-01-11 DIAGNOSIS — E78.00 PURE HYPERCHOLESTEROLEMIA: Chronic | ICD-10-CM

## 2021-01-11 DIAGNOSIS — M85.859 OSTEOPENIA OF NECK OF FEMUR, UNSPECIFIED LATERALITY: Chronic | ICD-10-CM

## 2021-01-11 DIAGNOSIS — E55.9 VITAMIN D DEFICIENCY: ICD-10-CM

## 2021-01-11 DIAGNOSIS — K83.8 BILIARY SLUDGE: Chronic | ICD-10-CM

## 2021-01-11 DIAGNOSIS — I10 ESSENTIAL HYPERTENSION: Chronic | ICD-10-CM

## 2021-01-11 DIAGNOSIS — E78.00 PURE HYPERCHOLESTEROLEMIA: Primary | Chronic | ICD-10-CM

## 2021-01-11 LAB
ALBUMIN SERPL-MCNC: 4.6 G/DL (ref 3.5–5.2)
ALBUMIN/GLOB SERPL: 1.5 G/DL
ALP SERPL-CCNC: 99 U/L (ref 39–117)
ALT SERPL W P-5'-P-CCNC: 25 U/L (ref 1–33)
ANION GAP SERPL CALCULATED.3IONS-SCNC: 11.2 MMOL/L (ref 5–15)
AST SERPL-CCNC: 30 U/L (ref 1–32)
BILIRUB SERPL-MCNC: 0.6 MG/DL (ref 0–1.2)
BUN SERPL-MCNC: 17 MG/DL (ref 8–23)
BUN/CREAT SERPL: 23.3 (ref 7–25)
CALCIUM SPEC-SCNC: 9.8 MG/DL (ref 8.6–10.5)
CHLORIDE SERPL-SCNC: 101 MMOL/L (ref 98–107)
CHOLEST SERPL-MCNC: 215 MG/DL (ref 0–200)
CO2 SERPL-SCNC: 28.8 MMOL/L (ref 22–29)
CREAT SERPL-MCNC: 0.73 MG/DL (ref 0.57–1)
GFR SERPL CREATININE-BSD FRML MDRD: 78 ML/MIN/1.73
GLOBULIN UR ELPH-MCNC: 3.1 GM/DL
GLUCOSE SERPL-MCNC: 104 MG/DL (ref 65–99)
HDLC SERPL-MCNC: 66 MG/DL (ref 40–60)
LDLC SERPL CALC-MCNC: 135 MG/DL (ref 0–100)
LDLC/HDLC SERPL: 2.01 {RATIO}
POTASSIUM SERPL-SCNC: 4.2 MMOL/L (ref 3.5–5.2)
PROT SERPL-MCNC: 7.7 G/DL (ref 6–8.5)
SODIUM SERPL-SCNC: 141 MMOL/L (ref 136–145)
TRIGL SERPL-MCNC: 82 MG/DL (ref 0–150)
VLDLC SERPL-MCNC: 14 MG/DL (ref 5–40)

## 2021-01-11 PROCEDURE — 80053 COMPREHEN METABOLIC PANEL: CPT | Performed by: INTERNAL MEDICINE

## 2021-01-11 PROCEDURE — 80061 LIPID PANEL: CPT | Performed by: INTERNAL MEDICINE

## 2021-01-11 PROCEDURE — 99214 OFFICE O/P EST MOD 30 MIN: CPT | Performed by: INTERNAL MEDICINE

## 2021-01-11 PROCEDURE — 82306 VITAMIN D 25 HYDROXY: CPT | Performed by: INTERNAL MEDICINE

## 2021-01-11 RX ORDER — ALENDRONATE SODIUM 70 MG/1
70 TABLET ORAL
Qty: 4 TABLET | Refills: 11 | Status: SHIPPED | OUTPATIENT
Start: 2021-01-11 | End: 2021-12-10

## 2021-01-11 RX ORDER — PRAVASTATIN SODIUM 40 MG
40 TABLET ORAL DAILY
Qty: 90 TABLET | Refills: 1 | Status: CANCELLED | OUTPATIENT
Start: 2021-01-11

## 2021-01-11 NOTE — PROGRESS NOTES
"Chief Complaint  Hypertension (follow up), Hyperlipidemia, osteopenia (discuss bone density report), and Med Refill    Subjective          Samara Phillips presents to [facility] for   History of Present Illness    Osteopenia-she had her bone density done last month and her femoral neck showed osteopenia.  Because of her personal and family history of fracture, her FRAX was high at 25% / 9%.  She has been on Fosamax in the past and tolerated.  She is on a calcium/D supplement. 4000iu on D    Hypertension-she is on metoprolol and BPs have been well-controlled    Hyperlipidemia-she is on pravastatin and is fasting today    Abdominal pain - ~6 yrs ago had episode that might have been biliary colic - did have RUQ US in '14 done which showed sludge. Did see surgeon then but decision made not to do surgery. Has watched diet pretty well and not had any severe attacks, but over the holidays ate more cheese and other high fat foods and felt similar discomfort in upper abd/chest      Objective   Vital Signs:   /74 (BP Location: Right arm, Patient Position: Sitting)   Pulse 54   Temp 97.1 °F (36.2 °C) (Infrared)   Ht 164.1 cm (64.6\")   Wt 82.5 kg (181 lb 12.8 oz)   SpO2 97%   BMI 30.63 kg/m²     Physical Exam   Constitutional: oriented to person, place, and time.  well-developed and well-nourished. No distress.   HENT:   Head: Normocephalic and atraumatic.   Eyes: Conjunctivae and EOM are normal.   Cardiovascular: Normal rate, regular rhythm and normal heart sounds.  Exam reveals no gallop and no friction rub.    No murmur heard.  Pulmonary/Chest: Effort normal and breath sounds normal. No respiratory distress.   no wheezes.   abd - no tenderness, no rebound, no guarding  Neurological:  alert and oriented to person, place, and time.   Skin: Skin is warm and dry. not diaphoretic.   Psychiatric:  normal mood and affect. behavior is normal. Judgment and thought content normal. "   Result Review :   The following data was reviewed by: Janey Adan MD on 01/11/2021:  Common labs    Common Labsle 7/13/20 7/13/20 7/13/20    1144 1144 1144   BUN  20    Creatinine  0.82    eGFR Non African Am  68    Sodium  142    Potassium  4.5    Chloride  103    Calcium  10.5    Albumin  4.40    Total Bilirubin  0.4    Alkaline Phosphatase  77    AST (SGOT)  25    ALT (SGPT)  21    WBC 4.60     Hemoglobin 14.2     Hematocrit 42.0     Platelets 340     Triglycerides   75   HDL Cholesterol   70 (A)   LDL Cholesterol    105 (A)   (A) Abnormal value            Data reviewed: Radiologic studies dexa and RUQ US from '14         Assessment and Plan    Problem List Items Addressed This Visit        Cardiac and Vasculature    Pure hypercholesterolemia - Primary- continue healthy diet, check labs today    Relevant Orders    Comprehensive Metabolic Panel    Lipid Panel    Essential hypertension- good control       Musculoskeletal and Injuries    Osteopenia of neck of femur- we will go ahead and start fosamax. Instructions on use given. Call if any problems      Other Visit Diagnoses     Vitamin D deficiency  - we will check today      Relevant Orders    Vitamin D 25 Hydroxy    Biliary sludge  (Chronic)   - we discussed repeating the RUQ US but she prefers to watch diet and see how she does over next few months.           Follow Up   Return in about 6 months (around 7/11/2021) for Medicare Wellness.  Patient was given instructions and counseling regarding her condition or for health maintenance advice. Please see specific information pulled into the AVS if appropriate.

## 2021-01-12 LAB — 25(OH)D3 SERPL-MCNC: 40 NG/ML (ref 30–100)

## 2021-01-16 RX ORDER — PRAVASTATIN SODIUM 40 MG
40 TABLET ORAL DAILY
Qty: 90 TABLET | Refills: 1 | Status: SHIPPED | OUTPATIENT
Start: 2021-01-16 | End: 2021-07-14 | Stop reason: SDUPTHER

## 2021-02-22 ENCOUNTER — TRANSCRIBE ORDERS (OUTPATIENT)
Dept: ADMINISTRATIVE | Facility: HOSPITAL | Age: 76
End: 2021-02-22

## 2021-02-22 DIAGNOSIS — Z12.31 VISIT FOR SCREENING MAMMOGRAM: Primary | ICD-10-CM

## 2021-02-24 ENCOUNTER — OFFICE VISIT (OUTPATIENT)
Dept: ORTHOPEDIC SURGERY | Facility: CLINIC | Age: 76
End: 2021-02-24

## 2021-02-24 VITALS — BODY MASS INDEX: 30.3 KG/M2 | OXYGEN SATURATION: 98 % | HEIGHT: 65 IN | HEART RATE: 68 BPM | WEIGHT: 181.88 LBS

## 2021-02-24 DIAGNOSIS — I87.8 VENOUS STASIS: ICD-10-CM

## 2021-02-24 DIAGNOSIS — M25.872 IMPINGEMENT SYNDROME OF LEFT ANKLE: ICD-10-CM

## 2021-02-24 DIAGNOSIS — M19.072 OSTEOARTHRITIS OF LEFT ANKLE OR FOOT: ICD-10-CM

## 2021-02-24 DIAGNOSIS — M79.672 LEFT FOOT PAIN: Primary | ICD-10-CM

## 2021-02-24 PROCEDURE — 99204 OFFICE O/P NEW MOD 45 MIN: CPT | Performed by: ORTHOPAEDIC SURGERY
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
## 2021-04-13 ENCOUNTER — HOSPITAL ENCOUNTER (OUTPATIENT)
Dept: MAMMOGRAPHY | Facility: HOSPITAL | Age: 76
Discharge: HOME | End: 2021-04-13
Admitting: INTERNAL MEDICINE

## 2021-04-13 DIAGNOSIS — Z12.31 VISIT FOR SCREENING MAMMOGRAM: ICD-10-CM

## 2021-04-13 PROCEDURE — 77063 BREAST TOMOSYNTHESIS BI: CPT | Performed by: RADIOLOGY

## 2021-04-13 PROCEDURE — 77063 BREAST TOMOSYNTHESIS BI: CPT

## 2021-04-13 PROCEDURE — 77067 SCR MAMMO BI INCL CAD: CPT

## 2021-04-13 PROCEDURE — 77067 SCR MAMMO BI INCL CAD: CPT | Performed by: RADIOLOGY

## 2021-04-20 RX ORDER — METOPROLOL SUCCINATE 50 MG/1
TABLET, EXTENDED RELEASE ORAL
Qty: 90 TABLET | Refills: 0 | Status: SHIPPED | OUTPATIENT
Start: 2021-04-20 | End: 2021-07-01

## 2021-06-01 RX ORDER — METOPROLOL SUCCINATE 50 MG/1
TABLET, EXTENDED RELEASE ORAL
Qty: 90 TABLET | Refills: 0 | OUTPATIENT
Start: 2021-06-01

## 2021-06-21 ENCOUNTER — TELEPHONE (OUTPATIENT)
Dept: INTERNAL MEDICINE | Facility: CLINIC | Age: 76
End: 2021-06-21

## 2021-06-21 NOTE — TELEPHONE ENCOUNTER
----- Message from Janey Adan MD sent at 6/17/2021  5:35 PM EDT -----  Regarding: Jury letter  Can you let her know we wrote letter and mailed it to the court for jury duty excuse

## 2021-07-01 RX ORDER — METOPROLOL SUCCINATE 50 MG/1
TABLET, EXTENDED RELEASE ORAL
Qty: 90 TABLET | Refills: 0 | Status: SHIPPED | OUTPATIENT
Start: 2021-07-01 | End: 2021-10-04

## 2021-07-11 NOTE — PROGRESS NOTES
History of Present Illness     Here for medicare wellness exam and physical. No hospitalizations in last year and pt feels health is stable    PHQ-2-0  Falls:none recently but does fall frequently and uses cane always.  Would like an order for a walker.  Memory: no concerns  Living will: does not have but is getting it done  Specialists:  Eye - [facility]  [facility] - Angel  Ortho - [place], [place] [facility] - [facility] - [facility]  (hair loss)     Exercise: was going to the [employer] but stopped when it closed but will start again soon and she does some stretches  on her own every day.  Unable to walk for distances because of her falls  Diet: healthy except for some sweets. Not a lot of red meat.  She is on ca/D daily,      htn - on metoprolol. She occasionally checks and usually gets a good reading     Hyperlipidemia - on pravastatin.  She is not fasting today    Osteopenia-she has tolerated the Fosamax     Her son who is in his 40s has been having trouble w/ balance and falls and walks very similarly to patient.  He was felt to have mild cerebral palsy as a child.  He did get seen by a neurologist and they did genetic testing on him and have it diagnosed hereditary spastic paraplegia (patient thinks this is what it is though not 100% sure today).  Patient feels like her father might have had the same thing but was diagnosed as multiple sclerosis.   Her son has an adopted child but no biological children. She has a daughter too but her daughter does not have symptoms.  Her daughter does not have children  Treatment apparently is supportive with physical therapy    Current Outpatient Medications on File Prior to Visit   Medication Sig Dispense Refill   • alendronate (Fosamax) 70 MG tablet Take 1 tablet by mouth Every 7 (Seven) Days. Take with full glass of water. wait 30 mintues before eating, drinking or laying down 4 tablet 11   • Calcium Carbonate-Vitamin D (CALCIUM-D PO) Take  by mouth.     •  metoprolol succinate XL (TOPROL-XL) 50 MG 24 hr tablet TAKE ONE TABLET BY MOUTH DAILY 90 tablet 0   • omeprazole (priLOSEC) 20 MG capsule Take 1 capsule by mouth Every Morning. 90 capsule 3   • [DISCONTINUED] fluticasone (FLONASE) 50 MCG/ACT nasal spray SPRAY TWO SPRAYS IN EACH NOSTRIL DAILY AS DIRECTED BY PRESCRIBER 1 bottle 11   • [DISCONTINUED] pravastatin (PRAVACHOL) 40 MG tablet Take 1 tablet by mouth Daily. 90 tablet 1   • polycarbophil 625 MG tablet tablet 2 tablets Daily.       No current facility-administered medications on file prior to visit.       The following portions of the patient's history were reviewed and updated as appropriate: allergies, current medications, past family history, past medical history, past social history, past surgical history and problem list.    Review of Systems   Constitutional: Negative for activity change, appetite change, fever, unexpected weight gain and unexpected weight loss.   HENT: Negative.    Eyes: Negative.    Respiratory: Negative for shortness of breath and wheezing.    Cardiovascular: Negative for chest pain, palpitations and leg swelling.   Gastrointestinal: Negative.    Endocrine: Negative.    Genitourinary: Negative for difficulty urinating and dysuria.   Skin: Negative.    Allergic/Immunologic: Negative for immunocompromised state.   Neurological: Negative for seizures, speech difficulty, memory problem and confusion.        Poor balance and frequent falls   Hematological: Does not bruise/bleed easily.   Psychiatric/Behavioral: Negative for agitation.         Objective    Vitals:    07/14/21 1118   BP: 118/64   Pulse: 62   Temp: 97.1 °F (36.2 °C)   SpO2: 96%     Physical Exam   Physical Exam  Vitals and nursing note reviewed.   Constitutional:       General: She is not in acute distress.     Appearance: She is well-developed. She is not diaphoretic.   HENT:      Head: Normocephalic and atraumatic.      Right Ear: External ear normal.      Left Ear: External  ear normal.      Nose: Nose normal.      Mouth/Throat:      Pharynx: No oropharyngeal exudate.   Eyes:      General: No scleral icterus.        Right eye: No discharge.         Left eye: No discharge.      Conjunctiva/sclera: Conjunctivae normal.      Pupils: Pupils are equal, round, and reactive to light.   Neck:      Thyroid: No thyromegaly.   Cardiovascular:      Rate and Rhythm: Normal rate and regular rhythm.      Heart sounds: Normal heart sounds. No murmur heard.   No friction rub. No gallop.    Pulmonary:      Effort: Pulmonary effort is normal. No respiratory distress.      Breath sounds: Normal breath sounds. No wheezing or rales.   Chest:      Breasts:         Right: No mass, nipple discharge, skin change or tenderness.         Left: No mass, nipple discharge, skin change or tenderness.   Abdominal:      General: Bowel sounds are normal. There is no distension.      Palpations: Abdomen is soft. There is no mass.      Tenderness: There is no abdominal tenderness. There is no guarding or rebound.   Musculoskeletal:         General: No deformity. Normal range of motion.      Cervical back: Normal range of motion and neck supple.   Lymphadenopathy:      Cervical: No cervical adenopathy.   Skin:     General: Skin is warm and dry.      Coloration: Skin is not pale.      Findings: No erythema or rash.   Neurological:      Mental Status: She is alert and oriented to person, place, and time.      Coordination: Coordination normal.      Deep Tendon Reflexes: Reflexes normal.   Psychiatric:         Behavior: Behavior normal.         Thought Content: Thought content normal.         Judgment: Judgment normal.            Assessment/Plan   Diagnoses and all orders for this visit:    1. Medicare annual wellness visit, subsequent (Primary)    2. Routine general medical examination at a health care facility  Regular exercise/healthy diet. BSE q month. Sunscreen use encouraged. caclium intake reviewed.  Living will - form  given  Ty due 4/22  Colon due '25  Pneumovax- had  prevnar- had  DT - allergic to this  DEXA due 12/22 (osteopenia, on fosamax)  Shingles- shingrix discussed -  can check at pharmacy since we do not have   covid vaccine - had  Does not need paps since age 65 or older and has had normal paps in past  -     CBC (No Diff); Future  -     TSH Rfx On Abnormal To Free T4; Future  -     Comprehensive Metabolic Panel; Future  -     Cancel: POC Urinalysis Dipstick, Automated    3. Pure hypercholesterolemia-nonfasting today so we will plan to recheck in 6 months.  Levels look decent when we checked last  -     CBC (No Diff); Future  -     TSH Rfx On Abnormal To Free T4; Future  -     Comprehensive Metabolic Panel; Future    4. Essential hypertension-good control  -     CBC (No Diff); Future  -     TSH Rfx On Abnormal To Free T4; Future  -     Comprehensive Metabolic Panel; Future    5. Poor balance/ Frequent falls  Comments:  Her son has been diagnosed with hereditary spastic paraplegia.  Patient likely has this as well since similar symptoms.  We discussed having her see neurologist but she declines.  It sounds like there is only supportive care for this and she will continue to do exercises.  I gave her order for walker    Other orders  -     Cancel: Lipid Panel; Future  -     fluticasone (FLONASE) 50 MCG/ACT nasal spray; 2 sprays into the nostril(s) as directed by provider Daily.  Dispense: 16 g; Refill: 11  -     pravastatin (PRAVACHOL) 40 MG tablet; Take 1 tablet by mouth Daily.  Dispense: 90 tablet; Refill: 1

## 2021-07-14 ENCOUNTER — OFFICE VISIT (OUTPATIENT)
Dept: INTERNAL MEDICINE | Facility: CLINIC | Age: 76
End: 2021-07-14

## 2021-07-14 ENCOUNTER — LAB (OUTPATIENT)
Dept: LAB | Facility: HOSPITAL | Age: 76
End: 2021-07-14

## 2021-07-14 VITALS
BODY MASS INDEX: 30.83 KG/M2 | HEART RATE: 62 BPM | TEMPERATURE: 97.1 F | OXYGEN SATURATION: 96 % | WEIGHT: 180.6 LBS | SYSTOLIC BLOOD PRESSURE: 118 MMHG | HEIGHT: 64 IN | DIASTOLIC BLOOD PRESSURE: 64 MMHG

## 2021-07-14 DIAGNOSIS — R29.6 FREQUENT FALLS: ICD-10-CM

## 2021-07-14 DIAGNOSIS — Z00.00 MEDICARE ANNUAL WELLNESS VISIT, SUBSEQUENT: Primary | ICD-10-CM

## 2021-07-14 DIAGNOSIS — I10 ESSENTIAL HYPERTENSION: ICD-10-CM

## 2021-07-14 DIAGNOSIS — R26.89 POOR BALANCE: ICD-10-CM

## 2021-07-14 DIAGNOSIS — E78.00 PURE HYPERCHOLESTEROLEMIA: ICD-10-CM

## 2021-07-14 DIAGNOSIS — Z00.00 ROUTINE GENERAL MEDICAL EXAMINATION AT A HEALTH CARE FACILITY: ICD-10-CM

## 2021-07-14 LAB
ALBUMIN SERPL-MCNC: 4 G/DL (ref 3.5–5.2)
ALBUMIN/GLOB SERPL: 1.3 G/DL
ALP SERPL-CCNC: 70 U/L (ref 39–117)
ALT SERPL W P-5'-P-CCNC: 20 U/L (ref 1–33)
ANION GAP SERPL CALCULATED.3IONS-SCNC: 12.1 MMOL/L (ref 5–15)
AST SERPL-CCNC: 31 U/L (ref 1–32)
BILIRUB SERPL-MCNC: 0.4 MG/DL (ref 0–1.2)
BUN SERPL-MCNC: 15 MG/DL (ref 8–23)
BUN/CREAT SERPL: 20.8 (ref 7–25)
CALCIUM SPEC-SCNC: 9.4 MG/DL (ref 8.6–10.5)
CHLORIDE SERPL-SCNC: 105 MMOL/L (ref 98–107)
CO2 SERPL-SCNC: 24.9 MMOL/L (ref 22–29)
CREAT SERPL-MCNC: 0.72 MG/DL (ref 0.57–1)
DEPRECATED RDW RBC AUTO: 47.9 FL (ref 37–54)
ERYTHROCYTE [DISTWIDTH] IN BLOOD BY AUTOMATED COUNT: 13.9 % (ref 12.3–15.4)
GFR SERPL CREATININE-BSD FRML MDRD: 79 ML/MIN/1.73
GLOBULIN UR ELPH-MCNC: 3.1 GM/DL
GLUCOSE SERPL-MCNC: 103 MG/DL (ref 65–99)
HCT VFR BLD AUTO: 43.4 % (ref 34–46.6)
HGB BLD-MCNC: 14.8 G/DL (ref 12–15.9)
MCH RBC QN AUTO: 31.9 PG (ref 26.6–33)
MCHC RBC AUTO-ENTMCNC: 34.1 G/DL (ref 31.5–35.7)
MCV RBC AUTO: 93.5 FL (ref 79–97)
PLATELET # BLD AUTO: 340 10*3/MM3 (ref 140–450)
PMV BLD AUTO: 11 FL (ref 6–12)
POTASSIUM SERPL-SCNC: 4.1 MMOL/L (ref 3.5–5.2)
PROT SERPL-MCNC: 7.1 G/DL (ref 6–8.5)
RBC # BLD AUTO: 4.64 10*6/MM3 (ref 3.77–5.28)
SODIUM SERPL-SCNC: 142 MMOL/L (ref 136–145)
TSH SERPL DL<=0.05 MIU/L-ACNC: 1.5 UIU/ML (ref 0.27–4.2)
WBC # BLD AUTO: 6.38 10*3/MM3 (ref 3.4–10.8)

## 2021-07-14 PROCEDURE — 84443 ASSAY THYROID STIM HORMONE: CPT | Performed by: INTERNAL MEDICINE

## 2021-07-14 PROCEDURE — 80053 COMPREHEN METABOLIC PANEL: CPT | Performed by: INTERNAL MEDICINE

## 2021-07-14 PROCEDURE — 1160F RVW MEDS BY RX/DR IN RCRD: CPT | Performed by: INTERNAL MEDICINE

## 2021-07-14 PROCEDURE — 1170F FXNL STATUS ASSESSED: CPT | Performed by: INTERNAL MEDICINE

## 2021-07-14 PROCEDURE — 85027 COMPLETE CBC AUTOMATED: CPT | Performed by: INTERNAL MEDICINE

## 2021-07-14 PROCEDURE — 99397 PER PM REEVAL EST PAT 65+ YR: CPT | Performed by: INTERNAL MEDICINE

## 2021-07-14 PROCEDURE — G0439 PPPS, SUBSEQ VISIT: HCPCS | Performed by: INTERNAL MEDICINE

## 2021-07-14 RX ORDER — FLUTICASONE PROPIONATE 50 MCG
2 SPRAY, SUSPENSION (ML) NASAL DAILY
Qty: 16 G | Refills: 11 | Status: SHIPPED | OUTPATIENT
Start: 2021-07-14 | End: 2023-01-17 | Stop reason: SDUPTHER

## 2021-07-14 RX ORDER — CALCIUM POLYCARBOPHIL 625 MG
1 TABLET ORAL DAILY
COMMUNITY

## 2021-07-14 RX ORDER — PRAVASTATIN SODIUM 40 MG
40 TABLET ORAL DAILY
Qty: 90 TABLET | Refills: 1 | Status: SHIPPED | OUTPATIENT
Start: 2021-07-14 | End: 2022-01-05 | Stop reason: SDUPTHER

## 2021-10-04 RX ORDER — METOPROLOL SUCCINATE 50 MG/1
TABLET, EXTENDED RELEASE ORAL
Qty: 90 TABLET | Refills: 0 | Status: SHIPPED | OUTPATIENT
Start: 2021-10-04 | End: 2021-12-21

## 2021-12-10 RX ORDER — ALENDRONATE SODIUM 70 MG/1
TABLET ORAL
Qty: 12 TABLET | Refills: 3 | Status: SHIPPED | OUTPATIENT
Start: 2021-12-10 | End: 2022-11-04

## 2021-12-10 NOTE — TELEPHONE ENCOUNTER
Rx Refill Note  Requested Prescriptions     Pending Prescriptions Disp Refills   • alendronate (FOSAMAX) 70 MG tablet [Pharmacy Med Name: ALENDRONATE SODIUM 70 MG TAB] 8 tablet      Sig: TAKE 1 TABLET BY MOUTH EVERY 7 DAYS WITH A FULL GLASS OF WATER. WAIT 30 MIN BEFORE EATING, DRINKING, OR LAYING DOWN      Last office visit with prescribing clinician: 7/14/2021      Next office visit with prescribing clinician: 1/5/2022     Last Fill Date: 1/11/2021  Quantity: 4  Refills: 11 April GUSTAVO Gutierres MA  12/10/21, 07:37 EST     Patient has enough refills to last till her next appointment. Please advise if refills are still appropriate. Last DEXA scan was on 12/15/2020.

## 2021-12-13 RX ORDER — ALENDRONATE SODIUM 70 MG/1
TABLET ORAL
Qty: 8 TABLET | OUTPATIENT
Start: 2021-12-13

## 2021-12-13 NOTE — TELEPHONE ENCOUNTER
Received a refill request for the alendronate that was sent in on 12/10/21.  I spoke to the pharmacy and they do have it ready for the patient so I refused the other refill request.

## 2021-12-21 RX ORDER — OMEPRAZOLE 20 MG/1
CAPSULE, DELAYED RELEASE ORAL
Qty: 90 CAPSULE | Refills: 0 | Status: SHIPPED | OUTPATIENT
Start: 2021-12-21 | End: 2022-03-21

## 2021-12-21 RX ORDER — METOPROLOL SUCCINATE 50 MG/1
TABLET, EXTENDED RELEASE ORAL
Qty: 90 TABLET | Refills: 0 | Status: SHIPPED | OUTPATIENT
Start: 2021-12-21 | End: 2022-03-21

## 2022-01-05 ENCOUNTER — LAB (OUTPATIENT)
Dept: LAB | Facility: HOSPITAL | Age: 77
End: 2022-01-05

## 2022-01-05 ENCOUNTER — OFFICE VISIT (OUTPATIENT)
Dept: INTERNAL MEDICINE | Facility: CLINIC | Age: 77
End: 2022-01-05

## 2022-01-05 VITALS
DIASTOLIC BLOOD PRESSURE: 68 MMHG | SYSTOLIC BLOOD PRESSURE: 132 MMHG | BODY MASS INDEX: 31.62 KG/M2 | WEIGHT: 185.2 LBS | OXYGEN SATURATION: 98 % | HEART RATE: 66 BPM | HEIGHT: 64 IN | TEMPERATURE: 96.9 F

## 2022-01-05 DIAGNOSIS — Z11.59 NEED FOR HEPATITIS C SCREENING TEST: ICD-10-CM

## 2022-01-05 DIAGNOSIS — E78.00 PURE HYPERCHOLESTEROLEMIA: Chronic | ICD-10-CM

## 2022-01-05 DIAGNOSIS — M85.851 OSTEOPENIA OF NECK OF RIGHT FEMUR: Chronic | ICD-10-CM

## 2022-01-05 DIAGNOSIS — I10 ESSENTIAL HYPERTENSION: Primary | Chronic | ICD-10-CM

## 2022-01-05 DIAGNOSIS — H61.22 IMPACTED CERUMEN OF LEFT EAR: ICD-10-CM

## 2022-01-05 DIAGNOSIS — H91.93 BILATERAL HEARING LOSS, UNSPECIFIED HEARING LOSS TYPE: ICD-10-CM

## 2022-01-05 DIAGNOSIS — I10 ESSENTIAL HYPERTENSION: Chronic | ICD-10-CM

## 2022-01-05 PROBLEM — M85.859 OSTEOPENIA OF NECK OF FEMUR: Chronic | Status: ACTIVE | Noted: 2020-12-19

## 2022-01-05 PROBLEM — R26.89 POOR BALANCE: Chronic | Status: ACTIVE | Noted: 2020-07-13

## 2022-01-05 LAB
ALBUMIN SERPL-MCNC: 4.3 G/DL (ref 3.5–5.2)
ALBUMIN/GLOB SERPL: 1.3 G/DL
ALP SERPL-CCNC: 74 U/L (ref 39–117)
ALT SERPL W P-5'-P-CCNC: 18 U/L (ref 1–33)
ANION GAP SERPL CALCULATED.3IONS-SCNC: 13.1 MMOL/L (ref 5–15)
AST SERPL-CCNC: 25 U/L (ref 1–32)
BILIRUB SERPL-MCNC: 0.4 MG/DL (ref 0–1.2)
BUN SERPL-MCNC: 19 MG/DL (ref 8–23)
BUN/CREAT SERPL: 31.7 (ref 7–25)
CALCIUM SPEC-SCNC: 9.7 MG/DL (ref 8.6–10.5)
CHLORIDE SERPL-SCNC: 105 MMOL/L (ref 98–107)
CHOLEST SERPL-MCNC: 182 MG/DL (ref 0–200)
CO2 SERPL-SCNC: 23.9 MMOL/L (ref 22–29)
CREAT SERPL-MCNC: 0.6 MG/DL (ref 0.57–1)
DEPRECATED RDW RBC AUTO: 42.9 FL (ref 37–54)
ERYTHROCYTE [DISTWIDTH] IN BLOOD BY AUTOMATED COUNT: 12.9 % (ref 12.3–15.4)
GFR SERPL CREATININE-BSD FRML MDRD: 97 ML/MIN/1.73
GLOBULIN UR ELPH-MCNC: 3.2 GM/DL
GLUCOSE SERPL-MCNC: 91 MG/DL (ref 65–99)
HCT VFR BLD AUTO: 43.3 % (ref 34–46.6)
HDLC SERPL-MCNC: 72 MG/DL (ref 40–60)
HGB BLD-MCNC: 14.6 G/DL (ref 12–15.9)
LDLC SERPL CALC-MCNC: 95 MG/DL (ref 0–100)
LDLC/HDLC SERPL: 1.31 {RATIO}
MCH RBC QN AUTO: 30.9 PG (ref 26.6–33)
MCHC RBC AUTO-ENTMCNC: 33.7 G/DL (ref 31.5–35.7)
MCV RBC AUTO: 91.7 FL (ref 79–97)
PLATELET # BLD AUTO: 382 10*3/MM3 (ref 140–450)
PMV BLD AUTO: 10.8 FL (ref 6–12)
POTASSIUM SERPL-SCNC: 3.9 MMOL/L (ref 3.5–5.2)
PROT SERPL-MCNC: 7.5 G/DL (ref 6–8.5)
RBC # BLD AUTO: 4.72 10*6/MM3 (ref 3.77–5.28)
SODIUM SERPL-SCNC: 142 MMOL/L (ref 136–145)
TRIGL SERPL-MCNC: 79 MG/DL (ref 0–150)
TSH SERPL DL<=0.05 MIU/L-ACNC: 1.27 UIU/ML (ref 0.27–4.2)
VLDLC SERPL-MCNC: 15 MG/DL (ref 5–40)
WBC NRBC COR # BLD: 5.08 10*3/MM3 (ref 3.4–10.8)

## 2022-01-05 PROCEDURE — 69209 REMOVE IMPACTED EAR WAX UNI: CPT | Performed by: INTERNAL MEDICINE

## 2022-01-05 PROCEDURE — 80061 LIPID PANEL: CPT | Performed by: INTERNAL MEDICINE

## 2022-01-05 PROCEDURE — 86803 HEPATITIS C AB TEST: CPT | Performed by: INTERNAL MEDICINE

## 2022-01-05 PROCEDURE — 99214 OFFICE O/P EST MOD 30 MIN: CPT | Performed by: INTERNAL MEDICINE

## 2022-01-05 PROCEDURE — 85027 COMPLETE CBC AUTOMATED: CPT | Performed by: INTERNAL MEDICINE

## 2022-01-05 PROCEDURE — 80053 COMPREHEN METABOLIC PANEL: CPT | Performed by: INTERNAL MEDICINE

## 2022-01-05 PROCEDURE — 84443 ASSAY THYROID STIM HORMONE: CPT | Performed by: INTERNAL MEDICINE

## 2022-01-05 RX ORDER — PRAVASTATIN SODIUM 40 MG
40 TABLET ORAL DAILY
Qty: 90 TABLET | Refills: 1 | Status: SHIPPED | OUTPATIENT
Start: 2022-01-05 | End: 2022-08-05 | Stop reason: SDUPTHER

## 2022-01-05 NOTE — PROGRESS NOTES
"Chief Complaint  Hyperlipidemia (f/u), Hypertension (f/u), osteopenia (f/u), Med Refill, and Hearing Loss (would like ears checked)    Subjective          Samara Phillips presents to [facility]  History of Present Illness    Hypertension-patient on metoprolol. She does check at home and gets 130s/70s range generally    Hyperlipidemia-she is on pravastatin.  Last FLP was 1 year ago. Gained some weight at [employer] but has lost it and feels like she is eating better    Osteopenia-she is on Fosamax.  Vitamin D level was 40 last year.  She is tolerating the Fosamax    Hearing loss- her  has noticed hearing loss but she has not. He saw an audiologist and he wants her to see them as well    Current Outpatient Medications:   •  alendronate (FOSAMAX) 70 MG tablet, TAKE 1 TABLET BY MOUTH EVERY 7 DAYS WITH A FULL GLASS OF WATER. WAIT 30 MIN BEFORE EATING, DRINKING, OR LAYING DOWN, Disp: 12 tablet, Rfl: 3  •  Calcium Carbonate-Vitamin D (CALCIUM-D PO), Take  by mouth., Disp: , Rfl:   •  fluticasone (FLONASE) 50 MCG/ACT nasal spray, 2 sprays into the nostril(s) as directed by provider Daily., Disp: 16 g, Rfl: 11  •  metoprolol succinate XL (TOPROL-XL) 50 MG 24 hr tablet, TAKE ONE TABLET BY MOUTH DAILY, Disp: 90 tablet, Rfl: 0  •  naproxen (EC NAPROSYN) 500 MG EC tablet, Take 1 tablet by mouth 2 (Two) Times a Day With Meals., Disp: 60 tablet, Rfl: 0  •  omeprazole (priLOSEC) 20 MG capsule, TAKE ONE CAPSULE BY MOUTH EVERY MORNING, Disp: 90 capsule, Rfl: 0  •  pravastatin (PRAVACHOL) 40 MG tablet, Take 1 tablet by mouth Daily., Disp: 90 tablet, Rfl: 1  •  polycarbophil 625 MG tablet tablet, 2 tablets Daily., Disp: , Rfl:       Objective   Vital Signs:   /68 (BP Location: Right arm, Patient Position: Sitting)   Pulse 66   Temp 96.9 °F (36.1 °C) (Infrared)   Ht 162.6 cm (64\")   Wt 84 kg (185 lb 3.2 oz)   SpO2 98%   BMI 31.79 kg/m²       Physical exam  Constitutional: oriented to " person, place, and time.  well-developed and well-nourished. No distress.   HENT:   Head: Normocephalic and atraumatic.   Ears - right ear - clear. Left ear - cerumen impaction  Eyes: Conjunctivae and EOM are normal.   Cardiovascular: Normal rate, regular rhythm and normal heart sounds.  Exam reveals no gallop and no friction rub.    No murmur heard.  Pulmonary/Chest: Effort normal and breath sounds normal. No respiratory distress.   no wheezes.   Neurological:  alert and oriented to person, place, and time.   Skin: Skin is warm and dry. not diaphoretic.   Psychiatric:  normal mood and affect. behavior is normal. Judgment and thought content normal.      Result Review :   The following data was reviewed by: Janey Adan MD on 01/05/2022:  CMP    CMP 1/11/21 7/14/21   Glucose 104 (A) 103 (A)   BUN 17 15   Creatinine 0.73 0.72   eGFR Non African Am 78 79   Sodium 141 142   Potassium 4.2 4.1   Chloride 101 105   Calcium 9.8 9.4   Albumin 4.60 4.00   Total Bilirubin 0.6 0.4   Alkaline Phosphatase 99 70   AST (SGOT) 30 31   ALT (SGPT) 25 20   (A) Abnormal value            Lipid Panel    Lipid Panel 1/11/21   Total Cholesterol 215 (A)   Triglycerides 82   HDL Cholesterol 66 (A)   VLDL Cholesterol 14   LDL Cholesterol  135 (A)   LDL/HDL Ratio 2.01   (A) Abnormal value            TSH    TSH 7/14/21   TSH 1.500           Lab Results   Component Value Date    YNEY62FB 40.0 01/11/2021    [id number] 432 07/13/2020        Ear Cerumen Removal    Date/Time: 1/5/2022 11:22 AM  Performed by: Janey Adan MD  Authorized by: Janey Adan MD     Anesthesia:  Local Anesthetic: none  Location details: left ear  Procedure type: irrigation   Sedation:  Patient sedated: no              Assessment and Plan    Diagnoses and all orders for this visit:    1. Essential hypertension (Primary)-good control  -     CBC (No Diff); Future    2. Pure hypercholesterolemia-check levels  -     TSH Rfx On Abnormal To Free T4; Future  -     Lipid  Panel; Future  -     Comprehensive Metabolic Panel; Future    3. Osteopenia of neck of right femur-on Fosamax    4. Need for hepatitis C screening test  -     Hepatitis C Antibody; Future    5. Impacted cerumen of left ear  -     Cerumen Removal    6. Bilateral hearing loss, unspecified hearing loss type-discussed with patient we could see if that the removal of the cerumen helps with the hearing but she would like to go ahead and be referred.  It looks like her  saw Dr. Ponce so we will see if she can see the audiologist at [place] [facility]  -     Ambulatory Referral to Audiology    Other orders  -     pravastatin (PRAVACHOL) 40 MG tablet; Take 1 tablet by mouth Daily.  Dispense: 90 tablet; Refill: 1        Follow Up   Return in about 6 months (around 7/5/2022) for Medicare Wellness.  Patient was given instructions and counseling regarding her condition or for health maintenance advice. Please see specific information pulled into the AVS if appropriate.     Preventative:  She had flu shot and Covid booster  Schedule wellness in 6 months

## 2022-01-06 LAB — HCV AB SER DONR QL: NORMAL

## 2022-03-14 ENCOUNTER — TRANSCRIBE ORDERS (OUTPATIENT)
Dept: ADMINISTRATIVE | Facility: HOSPITAL | Age: 77
End: 2022-03-14

## 2022-03-14 DIAGNOSIS — Z12.31 VISIT FOR SCREENING MAMMOGRAM: Primary | ICD-10-CM

## 2022-03-21 RX ORDER — METOPROLOL SUCCINATE 50 MG/1
TABLET, EXTENDED RELEASE ORAL
Qty: 90 TABLET | Refills: 1 | Status: SHIPPED | OUTPATIENT
Start: 2022-03-21 | End: 2022-08-05 | Stop reason: SDUPTHER

## 2022-03-21 RX ORDER — OMEPRAZOLE 20 MG/1
CAPSULE, DELAYED RELEASE ORAL
Qty: 90 CAPSULE | Refills: 3 | Status: SHIPPED | OUTPATIENT
Start: 2022-03-21 | End: 2023-01-17 | Stop reason: SDUPTHER

## 2022-04-15 ENCOUNTER — HOSPITAL ENCOUNTER (OUTPATIENT)
Dept: MAMMOGRAPHY | Facility: HOSPITAL | Age: 77
Discharge: HOME | End: 2022-04-15
Admitting: INTERNAL MEDICINE

## 2022-04-15 DIAGNOSIS — Z12.31 VISIT FOR SCREENING MAMMOGRAM: ICD-10-CM

## 2022-04-15 PROCEDURE — 77067 SCR MAMMO BI INCL CAD: CPT | Performed by: RADIOLOGY

## 2022-04-15 PROCEDURE — 77067 SCR MAMMO BI INCL CAD: CPT

## 2022-04-15 PROCEDURE — 77063 BREAST TOMOSYNTHESIS BI: CPT | Performed by: RADIOLOGY

## 2022-04-15 PROCEDURE — 77063 BREAST TOMOSYNTHESIS BI: CPT

## 2022-08-03 NOTE — PROGRESS NOTES
History of Present Illness     Here for medicare wellness exam and physical. No hospitalizations in last year and pt feels mental and physical health are stable from 1 year ago    PHQ-2-0  Falls: Abnormal gait so she has had frequent falls in the past but just one this year; uses cane and walker as needed.   Memory: no concerns  Pain - 0  Living will: has and brought today  Specialists:  Eye - Ekta COELLO - [place]  Ortho - [place], [place]  Derm - [facility] Deramtology - [place]  Dentist - [place]  ENT - Ramón     Exercise:  does some stretches and arm/leg exercises  daily.  Unable to walk for distances because of her falls  Diet: healthy except for some sweets. Not a lot of red meat.  not on ca/D right now but will restart. Fiber daily    Hypertension-patient on metoprolol. She does check at home and gets 125-135/70-80s range generally     Hyperlipidemia-she is on pravastatin.    She is fasting today     Osteopenia-she is on Fosamax.  Vitamin D level was 40 2 yrs ago.  She is tolerating the Fosamax     Her son has hereditary spastic paraparesis and patient likely does as well with similar symptoms of abnormal gait.  She had not wanted to be fully evaluated given her age.  However, she would like to work with a physical therapist        Current Outpatient Medications on File Prior to Visit   Medication Sig Dispense Refill   • alendronate (FOSAMAX) 70 MG tablet TAKE 1 TABLET BY MOUTH EVERY 7 DAYS WITH A FULL GLASS OF WATER. WAIT 30 MIN BEFORE EATING, DRINKING, OR LAYING DOWN 12 tablet 3   • Calcium Carbonate-Vitamin D (CALCIUM-D PO) Take  by mouth.     • fluticasone (FLONASE) 50 MCG/ACT nasal spray 2 sprays into the nostril(s) as directed by provider Daily. 16 g 11   • metoprolol succinate XL (TOPROL-XL) 50 MG 24 hr tablet TAKE ONE TABLET BY MOUTH DAILY 90 tablet 1   • naproxen (EC NAPROSYN) 500 MG EC tablet Take 1 tablet by mouth 2 (Two) Times a Day With Meals. 60 tablet 0   • omeprazole (priLOSEC) 20 MG  capsule TAKE ONE CAPSULE BY MOUTH EVERY MORNING 90 capsule 3   • polycarbophil 625 MG tablet tablet 2 tablets Daily.     • pravastatin (PRAVACHOL) 40 MG tablet Take 1 tablet by mouth Daily. 90 tablet 1     No current facility-administered medications on file prior to visit.       The following portions of the patient's history were reviewed and updated as appropriate: allergies, current medications, past family history, past medical history, past social history, past surgical history and problem list.    Review of Systems   Constitutional: Negative for activity change, appetite change, fever, unexpected weight gain and unexpected weight loss.   HENT: Negative.    Eyes: Negative.    Respiratory: Negative for shortness of breath and wheezing.    Cardiovascular: Negative for chest pain, palpitations and leg swelling.   Gastrointestinal: Positive for constipation (usually every 3 days).   Endocrine: Negative.    Genitourinary: Positive for frequency. Negative for difficulty urinating and dysuria.   Musculoskeletal: Positive for gait problem.   Skin: Negative.    Allergic/Immunologic: Negative for immunocompromised state.   Neurological: Negative for seizures, speech difficulty, memory problem and confusion.   Hematological: Does not bruise/bleed easily.   Psychiatric/Behavioral: Negative for agitation, dysphoric mood and depressed mood.         Objective    Vitals:    08/05/22 1104   BP: 132/78   Pulse: 53   Temp: 97.1 °F (36.2 °C)   SpO2: 97%     Physical Exam   Physical Exam  Vitals and nursing note reviewed.   Constitutional:       General: She is not in acute distress.     Appearance: She is well-developed. She is not diaphoretic.   HENT:      Head: Normocephalic and atraumatic.      Right Ear: External ear normal.      Left Ear: External ear normal.      Nose: Nose normal.      Mouth/Throat:      Pharynx: No oropharyngeal exudate.   Eyes:      General: No scleral icterus.        Right eye: No discharge.          Left eye: No discharge.      Conjunctiva/sclera: Conjunctivae normal.      Pupils: Pupils are equal, round, and reactive to light.   Neck:      Thyroid: No thyromegaly.   Cardiovascular:      Rate and Rhythm: Normal rate and regular rhythm.      Heart sounds: Normal heart sounds. No murmur heard.    No friction rub. No gallop.   Pulmonary:      Effort: Pulmonary effort is normal. No respiratory distress.      Breath sounds: Normal breath sounds. No wheezing or rales.   Chest:   Breasts:      Right: No mass, nipple discharge, skin change or tenderness.      Left: No mass, nipple discharge, skin change or tenderness.       Abdominal:      General: Bowel sounds are normal. There is no distension.      Palpations: Abdomen is soft. There is no mass.      Tenderness: There is no abdominal tenderness. There is no guarding or rebound.   Musculoskeletal:         General: No deformity. Normal range of motion.      Cervical back: Normal range of motion and neck supple.   Lymphadenopathy:      Cervical: No cervical adenopathy.   Skin:     General: Skin is warm and dry.      Coloration: Skin is not pale.      Findings: No erythema or rash.   Neurological:      Mental Status: She is alert and oriented to person, place, and time.      Coordination: Coordination normal.      Deep Tendon Reflexes: Reflexes normal.      Comments: Unsteady gait, uses cane   Psychiatric:         Behavior: Behavior normal.         Thought Content: Thought content normal.         Judgment: Judgment normal.            Assessment/Plan   Diagnoses and all orders for this visit:    1. Medicare annual wellness visit, subsequent (Primary)  Regular exercise/healthy diet. BSE q month. Sunscreen use encouraged.   Living will - she brought in today  Ty due 4/23  Colon due '25  Pneumovax- had  prevnar- had  DT - allergic to this  DEXA due 12/22 (osteopenia, on fosamax)  Shingles- shingrix discussed -  can check at pharmacy since we do not have   covid vaccine -  had  Does not need paps since age 65 or older and has had normal paps in past    2. Routine general medical examination at a health care facility  -     CBC (No Diff); Future  -     TSH Rfx On Abnormal To Free T4; Future  -     Lipid Panel; Future  -     Comprehensive Metabolic Panel; Future  -     POC Urinalysis Dipstick, Automated    3. Pure hypercholesterolemia-check levels today  -     pravastatin (PRAVACHOL) 40 MG tablet; Take 1 tablet by mouth Daily.  Dispense: 90 tablet; Refill: 1  -     CBC (No Diff); Future  -     TSH Rfx On Abnormal To Free T4; Future  -     Lipid Panel; Future  -     Comprehensive Metabolic Panel; Future    4. Essential hypertension-good control  -     CBC (No Diff); Future  -     TSH Rfx On Abnormal To Free T4; Future  -     Lipid Panel; Future  -     Comprehensive Metabolic Panel; Future  -     metoprolol succinate XL (TOPROL-XL) 50 MG 24 hr tablet; Take 1 tablet by mouth Daily.  Dispense: 90 tablet; Refill: 1    5. Need for pneumococcal vaccination  -     Cancel: Pneumococcal Conjugate Vaccine 20-[facility]    6. Urinary frequency  -     Urine Culture - Urine, Urine, Clean Catch; Future  -     Urine Culture - Urine, Urine, Clean Catch    7.  Abnormal gait, family history of hereditary spastic paraparesis-order for PT given for patient

## 2022-08-05 ENCOUNTER — OFFICE VISIT (OUTPATIENT)
Dept: INTERNAL MEDICINE | Facility: CLINIC | Age: 77
End: 2022-08-05

## 2022-08-05 ENCOUNTER — LAB (OUTPATIENT)
Dept: LAB | Facility: HOSPITAL | Age: 77
End: 2022-08-05

## 2022-08-05 VITALS
BODY MASS INDEX: 31.41 KG/M2 | HEART RATE: 53 BPM | WEIGHT: 184 LBS | SYSTOLIC BLOOD PRESSURE: 132 MMHG | TEMPERATURE: 97.1 F | HEIGHT: 64 IN | OXYGEN SATURATION: 97 % | DIASTOLIC BLOOD PRESSURE: 78 MMHG

## 2022-08-05 DIAGNOSIS — Z23 NEED FOR PNEUMOCOCCAL VACCINATION: ICD-10-CM

## 2022-08-05 DIAGNOSIS — E78.00 PURE HYPERCHOLESTEROLEMIA: ICD-10-CM

## 2022-08-05 DIAGNOSIS — R26.9 ABNORMAL GAIT: ICD-10-CM

## 2022-08-05 DIAGNOSIS — Z00.00 ROUTINE GENERAL MEDICAL EXAMINATION AT A HEALTH CARE FACILITY: ICD-10-CM

## 2022-08-05 DIAGNOSIS — R35.0 URINARY FREQUENCY: ICD-10-CM

## 2022-08-05 DIAGNOSIS — I10 ESSENTIAL HYPERTENSION: ICD-10-CM

## 2022-08-05 DIAGNOSIS — Z00.00 MEDICARE ANNUAL WELLNESS VISIT, SUBSEQUENT: Primary | ICD-10-CM

## 2022-08-05 LAB
ALBUMIN SERPL-MCNC: 4.2 G/DL (ref 3.5–5.2)
ALBUMIN/GLOB SERPL: 1.4 G/DL
ALP SERPL-CCNC: 76 U/L (ref 39–117)
ALT SERPL W P-5'-P-CCNC: 15 U/L (ref 1–33)
ANION GAP SERPL CALCULATED.3IONS-SCNC: 12 MMOL/L (ref 5–15)
AST SERPL-CCNC: 29 U/L (ref 1–32)
BILIRUB BLD-MCNC: NEGATIVE MG/DL
BILIRUB SERPL-MCNC: 0.5 MG/DL (ref 0–1.2)
BUN SERPL-MCNC: 15 MG/DL (ref 8–23)
BUN/CREAT SERPL: 21.1 (ref 7–25)
CALCIUM SPEC-SCNC: 9.1 MG/DL (ref 8.6–10.5)
CHLORIDE SERPL-SCNC: 109 MMOL/L (ref 98–107)
CHOLEST SERPL-MCNC: 190 MG/DL (ref 0–200)
CLARITY, POC: ABNORMAL
CO2 SERPL-SCNC: 23 MMOL/L (ref 22–29)
COLOR UR: YELLOW
CREAT SERPL-MCNC: 0.71 MG/DL (ref 0.57–1)
DEPRECATED RDW RBC AUTO: 44.4 FL (ref 37–54)
EGFRCR SERPLBLD CKD-EPI 2021: 88.8 ML/MIN/1.73
ERYTHROCYTE [DISTWIDTH] IN BLOOD BY AUTOMATED COUNT: 13.6 % (ref 12.3–15.4)
EXPIRATION DATE: ABNORMAL
GLOBULIN UR ELPH-MCNC: 3 GM/DL
GLUCOSE SERPL-MCNC: 95 MG/DL (ref 65–99)
GLUCOSE UR STRIP-MCNC: NEGATIVE MG/DL
HCT VFR BLD AUTO: 41.1 % (ref 34–46.6)
HDLC SERPL-MCNC: 63 MG/DL (ref 40–60)
HGB BLD-MCNC: 14 G/DL (ref 12–15.9)
KETONES UR QL: NEGATIVE
LDLC SERPL CALC-MCNC: 110 MG/DL (ref 0–100)
LDLC/HDLC SERPL: 1.72 {RATIO}
LEUKOCYTE EST, POC: ABNORMAL
Lab: ABNORMAL
MCH RBC QN AUTO: 30.8 PG (ref 26.6–33)
MCHC RBC AUTO-ENTMCNC: 34.1 G/DL (ref 31.5–35.7)
MCV RBC AUTO: 90.3 FL (ref 79–97)
NITRITE UR-MCNC: POSITIVE MG/ML
PH UR: 6 [PH] (ref 5–8)
PLATELET # BLD AUTO: 324 10*3/MM3 (ref 140–450)
PMV BLD AUTO: 10.6 FL (ref 6–12)
POTASSIUM SERPL-SCNC: 4.4 MMOL/L (ref 3.5–5.2)
PROT SERPL-MCNC: 7.2 G/DL (ref 6–8.5)
PROT UR STRIP-MCNC: NEGATIVE MG/DL
RBC # BLD AUTO: 4.55 10*6/MM3 (ref 3.77–5.28)
RBC # UR STRIP: NEGATIVE /UL
SODIUM SERPL-SCNC: 144 MMOL/L (ref 136–145)
SP GR UR: 1.02 (ref 1–1.03)
TRIGL SERPL-MCNC: 93 MG/DL (ref 0–150)
TSH SERPL DL<=0.05 MIU/L-ACNC: 1.62 UIU/ML (ref 0.27–4.2)
UROBILINOGEN UR QL: NORMAL
VLDLC SERPL-MCNC: 17 MG/DL (ref 5–40)
WBC NRBC COR # BLD: 5.93 10*3/MM3 (ref 3.4–10.8)

## 2022-08-05 PROCEDURE — 99397 PER PM REEVAL EST PAT 65+ YR: CPT | Performed by: INTERNAL MEDICINE

## 2022-08-05 PROCEDURE — 80053 COMPREHEN METABOLIC PANEL: CPT | Performed by: INTERNAL MEDICINE

## 2022-08-05 PROCEDURE — 1126F AMNT PAIN NOTED NONE PRSNT: CPT | Performed by: INTERNAL MEDICINE

## 2022-08-05 PROCEDURE — 87077 CULTURE AEROBIC IDENTIFY: CPT | Performed by: INTERNAL MEDICINE

## 2022-08-05 PROCEDURE — 87086 URINE CULTURE/COLONY COUNT: CPT | Performed by: INTERNAL MEDICINE

## 2022-08-05 PROCEDURE — 87186 SC STD MICRODIL/AGAR DIL: CPT | Performed by: INTERNAL MEDICINE

## 2022-08-05 PROCEDURE — 1170F FXNL STATUS ASSESSED: CPT | Performed by: INTERNAL MEDICINE

## 2022-08-05 PROCEDURE — 85027 COMPLETE CBC AUTOMATED: CPT | Performed by: INTERNAL MEDICINE

## 2022-08-05 PROCEDURE — 84443 ASSAY THYROID STIM HORMONE: CPT | Performed by: INTERNAL MEDICINE

## 2022-08-05 PROCEDURE — 81003 URINALYSIS AUTO W/O SCOPE: CPT | Performed by: INTERNAL MEDICINE

## 2022-08-05 PROCEDURE — G0439 PPPS, SUBSEQ VISIT: HCPCS | Performed by: INTERNAL MEDICINE

## 2022-08-05 PROCEDURE — 1160F RVW MEDS BY RX/DR IN RCRD: CPT | Performed by: INTERNAL MEDICINE

## 2022-08-05 PROCEDURE — 80061 LIPID PANEL: CPT | Performed by: INTERNAL MEDICINE

## 2022-08-05 RX ORDER — PRAVASTATIN SODIUM 40 MG
40 TABLET ORAL DAILY
Qty: 90 TABLET | Refills: 1 | Status: SHIPPED | OUTPATIENT
Start: 2022-08-05 | End: 2023-01-16

## 2022-08-05 RX ORDER — METOPROLOL SUCCINATE 50 MG/1
50 TABLET, EXTENDED RELEASE ORAL DAILY
Qty: 90 TABLET | Refills: 1 | Status: SHIPPED | OUTPATIENT
Start: 2022-08-05 | End: 2023-01-16

## 2022-08-07 LAB — BACTERIA SPEC AEROBE CULT: ABNORMAL

## 2022-08-08 ENCOUNTER — TELEPHONE (OUTPATIENT)
Dept: INTERNAL MEDICINE | Facility: CLINIC | Age: 77
End: 2022-08-08

## 2022-08-08 RX ORDER — CEFDINIR 300 MG/1
300 CAPSULE ORAL 2 TIMES DAILY
Qty: 10 CAPSULE | Refills: 0 | Status: SHIPPED | OUTPATIENT
Start: 2022-08-08 | End: 2023-03-15

## 2022-08-08 NOTE — TELEPHONE ENCOUNTER
----- Message from Janey Adan MD sent at 8/8/2022  6:05 AM EDT -----  Can you let her know, her urine did grow bacteria, so we I will send in antibiotic. It might help w/ her frequency

## 2022-11-04 RX ORDER — ALENDRONATE SODIUM 70 MG/1
TABLET ORAL
Qty: 12 TABLET | Refills: 3 | Status: SHIPPED | OUTPATIENT
Start: 2022-11-04 | End: 2023-01-17 | Stop reason: SDUPTHER

## 2022-12-04 DIAGNOSIS — Z78.0 POSTMENOPAUSAL: Primary | ICD-10-CM

## 2023-01-16 RX ORDER — PRAVASTATIN SODIUM 40 MG
TABLET ORAL
Qty: 90 TABLET | Refills: 0 | Status: SHIPPED | OUTPATIENT
Start: 2023-01-16 | End: 2023-03-18 | Stop reason: SDUPTHER

## 2023-01-16 RX ORDER — METOPROLOL SUCCINATE 50 MG/1
TABLET, EXTENDED RELEASE ORAL
Qty: 90 TABLET | Refills: 0 | Status: SHIPPED | OUTPATIENT
Start: 2023-01-16 | End: 2023-03-15 | Stop reason: SDUPTHER

## 2023-01-17 ENCOUNTER — TELEPHONE (OUTPATIENT)
Dept: INTERNAL MEDICINE | Facility: CLINIC | Age: 78
End: 2023-01-17
Payer: MEDICARE

## 2023-01-17 RX ORDER — ALENDRONATE SODIUM 70 MG/1
70 TABLET ORAL
Qty: 12 TABLET | Refills: 3 | Status: SHIPPED | OUTPATIENT
Start: 2023-01-17

## 2023-01-17 RX ORDER — OMEPRAZOLE 20 MG/1
20 CAPSULE, DELAYED RELEASE ORAL EVERY MORNING
Qty: 90 CAPSULE | Refills: 3 | Status: SHIPPED | OUTPATIENT
Start: 2023-01-17

## 2023-01-17 RX ORDER — FLUTICASONE PROPIONATE 50 MCG
2 SPRAY, SUSPENSION (ML) NASAL DAILY
Qty: 16 G | Refills: 11 | Status: SHIPPED | OUTPATIENT
Start: 2023-01-17

## 2023-01-17 NOTE — TELEPHONE ENCOUNTER
Pt called and stated the needed refills for Omeprazole 20 mgs, flonaze 50 mcg/act, and Alendronate 70 mg. Pt ask that they be called when refill is complete.

## 2023-03-15 ENCOUNTER — OFFICE VISIT (OUTPATIENT)
Dept: INTERNAL MEDICINE | Facility: CLINIC | Age: 78
End: 2023-03-15
Payer: MEDICARE

## 2023-03-15 ENCOUNTER — LAB (OUTPATIENT)
Dept: INTERNAL MEDICINE | Facility: CLINIC | Age: 78
End: 2023-03-15
Payer: MEDICARE

## 2023-03-15 VITALS
TEMPERATURE: 96.6 F | SYSTOLIC BLOOD PRESSURE: 138 MMHG | HEART RATE: 51 BPM | WEIGHT: 180 LBS | OXYGEN SATURATION: 94 % | HEIGHT: 64 IN | BODY MASS INDEX: 30.73 KG/M2 | DIASTOLIC BLOOD PRESSURE: 76 MMHG

## 2023-03-15 DIAGNOSIS — K59.09 OTHER CONSTIPATION: ICD-10-CM

## 2023-03-15 DIAGNOSIS — Z78.0 POSTMENOPAUSAL: ICD-10-CM

## 2023-03-15 DIAGNOSIS — M85.859 OSTEOPENIA OF NECK OF FEMUR, UNSPECIFIED LATERALITY: Chronic | ICD-10-CM

## 2023-03-15 DIAGNOSIS — M54.50 ACUTE MIDLINE LOW BACK PAIN WITHOUT SCIATICA: ICD-10-CM

## 2023-03-15 DIAGNOSIS — I10 ESSENTIAL HYPERTENSION: Chronic | ICD-10-CM

## 2023-03-15 DIAGNOSIS — E78.00 PURE HYPERCHOLESTEROLEMIA: Chronic | ICD-10-CM

## 2023-03-15 DIAGNOSIS — M79.602 LEFT ARM PAIN: ICD-10-CM

## 2023-03-15 DIAGNOSIS — E78.00 PURE HYPERCHOLESTEROLEMIA: Primary | Chronic | ICD-10-CM

## 2023-03-15 PROCEDURE — 3075F SYST BP GE 130 - 139MM HG: CPT | Performed by: INTERNAL MEDICINE

## 2023-03-15 PROCEDURE — 99214 OFFICE O/P EST MOD 30 MIN: CPT | Performed by: INTERNAL MEDICINE

## 2023-03-15 PROCEDURE — 85027 COMPLETE CBC AUTOMATED: CPT | Performed by: INTERNAL MEDICINE

## 2023-03-15 PROCEDURE — 36415 COLL VENOUS BLD VENIPUNCTURE: CPT | Performed by: INTERNAL MEDICINE

## 2023-03-15 PROCEDURE — 1159F MED LIST DOCD IN RCRD: CPT | Performed by: INTERNAL MEDICINE

## 2023-03-15 PROCEDURE — 80061 LIPID PANEL: CPT | Performed by: INTERNAL MEDICINE

## 2023-03-15 PROCEDURE — 1160F RVW MEDS BY RX/DR IN RCRD: CPT | Performed by: INTERNAL MEDICINE

## 2023-03-15 PROCEDURE — 84443 ASSAY THYROID STIM HORMONE: CPT | Performed by: INTERNAL MEDICINE

## 2023-03-15 PROCEDURE — 80053 COMPREHEN METABOLIC PANEL: CPT | Performed by: INTERNAL MEDICINE

## 2023-03-15 PROCEDURE — 3078F DIAST BP <80 MM HG: CPT | Performed by: INTERNAL MEDICINE

## 2023-03-15 RX ORDER — METHOCARBAMOL 500 MG/1
TABLET, FILM COATED ORAL
Qty: 30 TABLET | Refills: 0 | Status: SHIPPED | OUTPATIENT
Start: 2023-03-15

## 2023-03-15 RX ORDER — PRAVASTATIN SODIUM 40 MG
40 TABLET ORAL DAILY
Qty: 90 TABLET | Refills: 0 | Status: CANCELLED | OUTPATIENT
Start: 2023-03-15

## 2023-03-15 RX ORDER — METOPROLOL SUCCINATE 50 MG/1
50 TABLET, EXTENDED RELEASE ORAL DAILY
Qty: 90 TABLET | Refills: 1 | Status: SHIPPED | OUTPATIENT
Start: 2023-03-15

## 2023-03-15 NOTE — PROGRESS NOTES
Chief Complaint  Hypertension (F/u), Hyperlipidemia (F/u), Med Refill, Back Pain (discuss), and ear issue (She would like her ears to be checked.)    Subjective          Samara Phillips presents to [facility]  History of Present Illness  Hypertension-patient on metoprolol. She does check at home and gets good readings usually in 130s/70-80s range generally     Hyperlipidemia-she is on pravastatin.      She is fasting today     Osteopenia-she is on Fosamax.       Back pain across her lower back that started today.  No pain going down the legs.  No injury. She took 2 tylenol and 1 ibuprofen. She did have xr in 9/21 that was essentially negative. She has had some back pain on and off through the years. She does have some back pain on and off through the years usually if she has lifted something and generally has been on the right side.    She did do PT recently which helped some w/ endurance    Ears - she is wondering if she needs ears cleaned out. No pain or fullness    Constipation - she has had some trouble w/ not going regualrly, and at times can be hard. Trying to stay well hydrated. She gets a good amount of fruits and veggies    LE edema over last 20 yrs- when she goes to [place], her feet tend to swell when she is on the beach. She feels it is from the heat.     Left arm pain-patient broke her humerus many years ago and there is a knot there where the bone healed.  She notices occasionally pain below that not when she uses her walker and she would like to get an x-ray just to make sure there is no new fracture    Current Outpatient Medications:   •  alendronate (FOSAMAX) 70 MG tablet, Take 1 tablet by mouth Every 7 (Seven) Days., Disp: 12 tablet, Rfl: 3  •  Calcium Carbonate-Vitamin D (CALCIUM-D PO), Take  by mouth., Disp: , Rfl:   •  fluticasone (FLONASE) 50 MCG/ACT nasal spray, 2 sprays into the nostril(s) as directed by provider Daily., Disp: 16 g, Rfl: 11  •  metoprolol  "succinate XL (TOPROL-XL) 50 MG 24 hr tablet, Take 1 tablet by mouth Daily., Disp: 90 tablet, Rfl: 1  •  omeprazole (priLOSEC) 20 MG capsule, Take 1 capsule by mouth Every Morning., Disp: 90 capsule, Rfl: 3  •  polycarbophil 625 MG tablet tablet, 1 tablet Daily. [employer] Fiber pill, Disp: , Rfl:   •  pravastatin (PRAVACHOL) 40 MG tablet, TAKE 1 TABLET BY MOUTH EVERY DAY, Disp: 90 tablet, Rfl: 0  •  methocarbamol (Robaxin) 500 MG tablet, 1 3 times daily as needed for back pain, Disp: 30 tablet, Rfl: 0  •  naproxen (EC NAPROSYN) 500 MG EC tablet, Take 1 tablet by mouth 2 (Two) Times a Day With Meals. (Patient not taking: Reported on 3/15/2023), Disp: 60 tablet, Rfl: 0   The following portions of the patient's history were reviewed and updated as appropriate: allergies, current medications, past family history, past medical history, past social history, past surgical history and problem list.     Objective   Vital Signs:   /76 (BP Location: Right arm, Patient Position: Sitting)   Pulse 51   Temp 96.6 °F (35.9 °C) (Infrared)   Ht 163.6 cm (64.4\")   Wt 81.6 kg (180 lb)   SpO2 94%   BMI 30.51 kg/m²       Physical exam  Constitutional: oriented to person, place, and time.  well-developed and well-nourished. No distress.   HENT:   Head: Normocephalic and atraumatic.   Ears: Small amount of wax in the left ear canal but both TMs visible  Eyes: Conjunctivae and EOM are normal.   Cardiovascular: Normal rate, regular rhythm and normal heart sounds.  Exam reveals no gallop and no friction rub.    No murmur heard.  Pulmonary/Chest: Effort normal and breath sounds normal. No respiratory distress.   no wheezes.   Neurological:  alert and oriented to person, place, and time.   MS: No tenderness today over lumbar spine and negative straight leg raise bilaterally.  Left humerus-some mild tenderness underneath callus where she had previous fracture  Skin: Skin is warm and dry. not diaphoretic.   Psychiatric:  normal mood and " affect. behavior is normal. Judgment and thought content normal.      Result Review :   The following data was reviewed by: Janey Adan MD on 03/15/2023:  Common labs    Common Labs 8/5/22 8/5/22 8/5/22    1147 1147 1147   Glucose   95   BUN   15   Creatinine   0.71   Sodium   144   Potassium   4.4   Chloride   109 (A)   Calcium   9.1   Albumin   4.20   Total Bilirubin   0.5   Alkaline Phosphatase   76   AST (SGOT)   29   ALT (SGPT)   15   WBC 5.93     Hemoglobin 14.0     Hematocrit 41.1     Platelets 324     Total Cholesterol  190    Triglycerides  93    HDL Cholesterol  63 (A)    LDL Cholesterol   110 (A)    (A) Abnormal value            Lab Results   Component Value Date    TJVK18MD 40.0 01/11/2021    [id number] 432 07/13/2020               Assessment and Plan    Diagnoses and all orders for this visit:    1. Pure hypercholesterolemia (Primary)-check levels today  -     Comprehensive Metabolic Panel; Future  -     Lipid Panel; Future    2. Essential hypertension-good control  -     CBC (No Diff); Future    3. Osteopenia of neck of femur, unspecified laterality-DEXA due, we will schedule    4. Postmenopausal  -     DEXA Bone Density Axial; Future    5. Other constipation-discussed different regimens she could try  -     TSH; Future    6. Left arm pain-check x-ray  -     XR humerus left; Future    7. Acute midline low back pain without sciatica-check x-ray.  Exercise given and we will also try muscle relaxer as needed.  Side effects of the muscle relaxer, including increased risk for fall, reviewed  -     XR Spine Lumbar 2 or 3 View; Future  -     methocarbamol (Robaxin) 500 MG tablet; 1 3 times daily as needed for back pain  Dispense: 30 tablet; Refill: 0    Other orders  -     metoprolol succinate XL (TOPROL-XL) 50 MG 24 hr tablet; Take 1 tablet by mouth Daily.  Dispense: 90 tablet; Refill: 1      Lower extremity swelling when she is at the beach-discussed is probably from the heat and we discussed measures  that might help      Follow Up   Return in about 6 months (around 9/15/2023) for Medicare Wellness.  Patient was given instructions and counseling regarding her condition or for health maintenance advice. Please see specific information pulled into the AVS if appropriate.

## 2023-03-16 LAB
ALBUMIN SERPL-MCNC: 4.2 G/DL (ref 3.5–5.2)
ALBUMIN/GLOB SERPL: 1.2 G/DL
ALP SERPL-CCNC: 70 U/L (ref 39–117)
ALT SERPL W P-5'-P-CCNC: 16 U/L (ref 1–33)
ANION GAP SERPL CALCULATED.3IONS-SCNC: 11 MMOL/L (ref 5–15)
AST SERPL-CCNC: 30 U/L (ref 1–32)
BILIRUB SERPL-MCNC: 0.4 MG/DL (ref 0–1.2)
BUN SERPL-MCNC: 13 MG/DL (ref 8–23)
BUN/CREAT SERPL: 17.6 (ref 7–25)
CALCIUM SPEC-SCNC: 9.8 MG/DL (ref 8.6–10.5)
CHLORIDE SERPL-SCNC: 105 MMOL/L (ref 98–107)
CHOLEST SERPL-MCNC: 184 MG/DL (ref 0–200)
CO2 SERPL-SCNC: 26 MMOL/L (ref 22–29)
CREAT SERPL-MCNC: 0.74 MG/DL (ref 0.57–1)
DEPRECATED RDW RBC AUTO: 46.4 FL (ref 37–54)
EGFRCR SERPLBLD CKD-EPI 2021: 84 ML/MIN/1.73
ERYTHROCYTE [DISTWIDTH] IN BLOOD BY AUTOMATED COUNT: 14.1 % (ref 12.3–15.4)
GLOBULIN UR ELPH-MCNC: 3.5 GM/DL
GLUCOSE SERPL-MCNC: 100 MG/DL (ref 65–99)
HCT VFR BLD AUTO: 42 % (ref 34–46.6)
HDLC SERPL-MCNC: 66 MG/DL (ref 40–60)
HGB BLD-MCNC: 14.3 G/DL (ref 12–15.9)
LDLC SERPL CALC-MCNC: 103 MG/DL (ref 0–100)
LDLC/HDLC SERPL: 1.54 {RATIO}
MCH RBC QN AUTO: 31 PG (ref 26.6–33)
MCHC RBC AUTO-ENTMCNC: 34 G/DL (ref 31.5–35.7)
MCV RBC AUTO: 91.1 FL (ref 79–97)
PLATELET # BLD AUTO: 335 10*3/MM3 (ref 140–450)
PMV BLD AUTO: 10.7 FL (ref 6–12)
POTASSIUM SERPL-SCNC: 4.6 MMOL/L (ref 3.5–5.2)
PROT SERPL-MCNC: 7.7 G/DL (ref 6–8.5)
RBC # BLD AUTO: 4.61 10*6/MM3 (ref 3.77–5.28)
SODIUM SERPL-SCNC: 142 MMOL/L (ref 136–145)
TRIGL SERPL-MCNC: 82 MG/DL (ref 0–150)
TSH SERPL DL<=0.05 MIU/L-ACNC: 1.33 UIU/ML (ref 0.27–4.2)
VLDLC SERPL-MCNC: 15 MG/DL (ref 5–40)
WBC NRBC COR # BLD: 6.59 10*3/MM3 (ref 3.4–10.8)

## 2023-03-18 RX ORDER — PRAVASTATIN SODIUM 40 MG
40 TABLET ORAL DAILY
Qty: 90 TABLET | Refills: 1 | Status: SHIPPED | OUTPATIENT
Start: 2023-03-18

## 2023-03-21 ENCOUNTER — HOSPITAL ENCOUNTER (OUTPATIENT)
Dept: GENERAL RADIOLOGY | Facility: HOSPITAL | Age: 78
Discharge: HOME | End: 2023-03-21
Admitting: INTERNAL MEDICINE
Payer: MEDICARE

## 2023-03-21 DIAGNOSIS — M79.602 LEFT ARM PAIN: ICD-10-CM

## 2023-03-21 DIAGNOSIS — M54.50 ACUTE MIDLINE LOW BACK PAIN WITHOUT SCIATICA: ICD-10-CM

## 2023-03-21 PROCEDURE — 72100 X-RAY EXAM L-S SPINE 2/3 VWS: CPT

## 2023-03-21 PROCEDURE — 73060 X-RAY EXAM OF HUMERUS: CPT

## 2023-03-22 ENCOUNTER — TRANSCRIBE ORDERS (OUTPATIENT)
Dept: ADMINISTRATIVE | Facility: HOSPITAL | Age: 78
End: 2023-03-22
Payer: MEDICARE

## 2023-03-22 DIAGNOSIS — Z12.31 VISIT FOR SCREENING MAMMOGRAM: Primary | ICD-10-CM

## 2023-03-24 ENCOUNTER — TELEPHONE (OUTPATIENT)
Dept: INTERNAL MEDICINE | Facility: CLINIC | Age: 78
End: 2023-03-24
Payer: MEDICARE

## 2023-03-24 NOTE — TELEPHONE ENCOUNTER
----- Message from Janey Adan MD sent at 3/24/2023  9:18 AM EDT -----  Can you let her know her arm x-ray shows that healed fracture but no new problems.  Her back x-ray does show some arthritis changes which would be typical for her age.  Is her back feeling better?

## 2023-05-02 ENCOUNTER — TELEPHONE (OUTPATIENT)
Dept: INTERNAL MEDICINE | Facility: CLINIC | Age: 78
End: 2023-05-02
Payer: MEDICARE

## 2023-05-02 NOTE — TELEPHONE ENCOUNTER
Caller: Cindy Phillips    Relationship: Self    Best call back number: [phone]    What is the medical concern/diagnosis: HEREDITY SPASTICITY     What specialty or service is being requested: NEUROLOGY    What is the provider, practice or medical service name: WITHIN [facility]    Any additional details: PLEASE  CALL PATIENT BACK WITH OPTIONS FOR NEUROLOGY REFERRALS.

## 2023-05-04 NOTE — TELEPHONE ENCOUNTER
Patient notified she will need an appointment for the referral.  I made her an appointment for 6/7/23.

## 2023-05-18 ENCOUNTER — HOSPITAL ENCOUNTER (OUTPATIENT)
Dept: BONE DENSITY | Facility: HOSPITAL | Age: 78
Discharge: HOME | End: 2023-05-18
Payer: MEDICARE

## 2023-05-18 ENCOUNTER — HOSPITAL ENCOUNTER (OUTPATIENT)
Dept: MAMMOGRAPHY | Facility: HOSPITAL | Age: 78
Discharge: HOME | End: 2023-05-18
Payer: MEDICARE

## 2023-05-18 DIAGNOSIS — Z78.0 POSTMENOPAUSAL: ICD-10-CM

## 2023-05-18 DIAGNOSIS — Z12.31 VISIT FOR SCREENING MAMMOGRAM: ICD-10-CM

## 2023-05-18 PROCEDURE — 77067 SCR MAMMO BI INCL CAD: CPT

## 2023-05-18 PROCEDURE — 77080 DXA BONE DENSITY AXIAL: CPT

## 2023-05-18 PROCEDURE — 77063 BREAST TOMOSYNTHESIS BI: CPT

## 2023-06-07 ENCOUNTER — OFFICE VISIT (OUTPATIENT)
Dept: INTERNAL MEDICINE | Facility: CLINIC | Age: 78
End: 2023-06-07
Payer: MEDICARE

## 2023-06-07 VITALS
OXYGEN SATURATION: 94 % | HEART RATE: 64 BPM | DIASTOLIC BLOOD PRESSURE: 72 MMHG | TEMPERATURE: 97.7 F | BODY MASS INDEX: 28.17 KG/M2 | WEIGHT: 165 LBS | SYSTOLIC BLOOD PRESSURE: 128 MMHG | HEIGHT: 64 IN

## 2023-06-07 DIAGNOSIS — R26.89 POOR BALANCE: Chronic | ICD-10-CM

## 2023-06-07 DIAGNOSIS — R29.6 FREQUENT FALLS: ICD-10-CM

## 2023-06-07 DIAGNOSIS — Z82.0 FAMILY HISTORY OF NEUROLOGIC DISORDER: ICD-10-CM

## 2023-06-07 DIAGNOSIS — R26.9 ABNORMAL GAIT: Primary | ICD-10-CM

## 2023-06-07 PROCEDURE — 1160F RVW MEDS BY RX/DR IN RCRD: CPT | Performed by: INTERNAL MEDICINE

## 2023-06-07 PROCEDURE — 99213 OFFICE O/P EST LOW 20 MIN: CPT | Performed by: INTERNAL MEDICINE

## 2023-06-07 PROCEDURE — 3074F SYST BP LT 130 MM HG: CPT | Performed by: INTERNAL MEDICINE

## 2023-06-07 PROCEDURE — 3078F DIAST BP <80 MM HG: CPT | Performed by: INTERNAL MEDICINE

## 2023-06-07 PROCEDURE — 1159F MED LIST DOCD IN RCRD: CPT | Performed by: INTERNAL MEDICINE

## 2023-06-07 NOTE — PROGRESS NOTES
Answers submitted by the patient for this visit:  Primary Reason for Visit (Submitted on 6/5/2023)  What is the primary reason for your visit?: Neurological Problem  Neurological Problem Questionnaire (Submitted on 6/5/2023)  Chief Complaint: Neurologic complaint  altered mental status: No  clumsiness: Yes  focal sensory loss: No  focal weakness: Yes  loss of balance: Yes  memory loss: No  near-syncope: No  slurred speech: No  syncope: No  visual change: No  weakness: No  Chronicity: recurrent  Onset: more than 1 year ago  Onset quality: gradually  Progression since onset: gradually worsening  Focality: lower extremity  abdominal pain: No  auditory change: No  aura: No  back pain: No  bladder incontinence: Yes  bowel incontinence: No  confusion: No  diaphoresis: No  dizziness: No  fever: No  headaches: No  light-headedness: No  neck pain: No  palpitations: No  shortness of breath: No  vertigo: No  vomiting: No  Chief Complaint  Lower Extremity Issue (She falls down a lot and would like a referral to neurologist to discuss her issue.)    Subjective          Cindy Phillips presents to [facility]  History of Present Illness    Her son has been diagnosed with hereditary spastic paraparesis. Pt feels she might have it as well, as she has history of very frequent falls, poor balance, B leg weakness and spasticity. She has done some PT to try to help w/ her balance- helped a little. She does try to do some exercises on her own as well but has to be very careful regarding falling.    Diverticulosis -after her colonoscopy with Dr. Ortiz, she states he gave her prescription powder fiber supplement but she does not remember the name of it but did not like and is wondering what else she can use as a fiber supplement.  She had tried a powder but not sure name but has had trouble w/ it-bowel movements vary from loose to constipated so she is off.  We have polycarbophil tablets listed in her chart  "but she says she is not using this      Current Outpatient Medications:     alendronate (FOSAMAX) 70 MG tablet, Take 1 tablet by mouth Every 7 (Seven) Days., Disp: 12 tablet, Rfl: 3    Calcium Carbonate-Vitamin D (CALCIUM-D PO), Take  by mouth., Disp: , Rfl:     fluticasone (FLONASE) 50 MCG/ACT nasal spray, 2 sprays into the nostril(s) as directed by provider Daily., Disp: 16 g, Rfl: 11    methocarbamol (Robaxin) 500 MG tablet, 1 3 times daily as needed for back pain, Disp: 30 tablet, Rfl: 0    metoprolol succinate XL (TOPROL-XL) 50 MG 24 hr tablet, Take 1 tablet by mouth Daily., Disp: 90 tablet, Rfl: 1    naproxen (EC NAPROSYN) 500 MG EC tablet, Take 1 tablet by mouth 2 (Two) Times a Day With Meals. (Patient taking differently: Take 1 tablet by mouth 2 (Two) Times a Day As Needed.), Disp: 60 tablet, Rfl: 0    omeprazole (priLOSEC) 20 MG capsule, Take 1 capsule by mouth Every Morning., Disp: 90 capsule, Rfl: 3    pravastatin (PRAVACHOL) 40 MG tablet, Take 1 tablet by mouth Daily., Disp: 90 tablet, Rfl: 1   The following portions of the patient's history were reviewed and updated as appropriate: allergies, current medications, past family history, past medical history, past social history, past surgical history and problem list.     Objective   Vital Signs:   /72 (BP Location: Right arm, Patient Position: Sitting)   Pulse 64   Temp 97.7 °F (36.5 °C) (Infrared)   Ht 163.6 cm (64.4\")   Wt 74.8 kg (165 lb)   SpO2 94%   BMI 27.97 kg/m²       Physical exam  Constitutional: oriented to person, place, and time.  well-developed and well-nourished. No distress.   HENT:   Head: Normocephalic and atraumatic.   Eyes: Conjunctivae and EOM are normal.   Cardiovascular: Normal rate, regular rhythm and normal heart sounds.  Exam reveals no gallop and no friction rub.    No murmur heard.  Pulmonary/Chest: Effort normal and breath sounds normal. No respiratory distress.   no wheezes.   Neurological:  DTR 3/4 B LE; alert " and oriented to person, place, and time. Decreased strength B LEs and slow, spastic gait, uses cane  Skin: Skin is warm and dry. not diaphoretic.   Psychiatric:  normal mood and affect. behavior is normal. Judgment and thought content normal.      Result Review :                   Assessment and Plan    Diagnoses and all orders for this visit:    1. Abnormal gait (Primary)  -     Ambulatory Referral to Neurology    2. Poor balance  -     Ambulatory Referral to Neurology    3. Family history of neurologic disorder-son with  hereditary spastic paraparesis  -     Ambulatory Referral to Neurology    4. Frequent falls  -     Ambulatory Referral to Neurology    Discussed she could try fiber gummy to see if she tolerates it better for the diverticulosis    Follow Up   No follow-ups on file.  Patient was given instructions and counseling regarding her condition or for health maintenance advice. Please see specific information pulled into the AVS if appropriate.

## 2023-08-02 RX ORDER — OMEPRAZOLE 20 MG/1
20 CAPSULE, DELAYED RELEASE ORAL EVERY MORNING
Qty: 90 CAPSULE | Refills: 3 | Status: CANCELLED | OUTPATIENT
Start: 2023-08-02

## 2023-08-07 ENCOUNTER — OFFICE VISIT (OUTPATIENT)
Dept: ORTHOPEDIC SURGERY | Facility: CLINIC | Age: 78
End: 2023-08-07
Payer: MEDICARE

## 2023-08-07 VITALS
SYSTOLIC BLOOD PRESSURE: 136 MMHG | HEIGHT: 64 IN | WEIGHT: 156.2 LBS | DIASTOLIC BLOOD PRESSURE: 78 MMHG | BODY MASS INDEX: 26.67 KG/M2

## 2023-08-07 DIAGNOSIS — M79.671 RIGHT FOOT PAIN: Primary | ICD-10-CM

## 2023-08-07 PROCEDURE — 3078F DIAST BP <80 MM HG: CPT | Performed by: ORTHOPAEDIC SURGERY

## 2023-08-07 PROCEDURE — 3075F SYST BP GE 130 - 139MM HG: CPT | Performed by: ORTHOPAEDIC SURGERY

## 2023-08-07 PROCEDURE — 1159F MED LIST DOCD IN RCRD: CPT | Performed by: ORTHOPAEDIC SURGERY

## 2023-08-07 PROCEDURE — 99213 OFFICE O/P EST LOW 20 MIN: CPT | Performed by: ORTHOPAEDIC SURGERY

## 2023-08-07 PROCEDURE — 1160F RVW MEDS BY RX/DR IN RCRD: CPT | Performed by: ORTHOPAEDIC SURGERY
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
## 2023-08-28 ENCOUNTER — OFFICE VISIT (OUTPATIENT)
Dept: ORTHOPEDIC SURGERY | Facility: CLINIC | Age: 78
End: 2023-08-28
Payer: MEDICARE

## 2023-08-28 VITALS
SYSTOLIC BLOOD PRESSURE: 136 MMHG | DIASTOLIC BLOOD PRESSURE: 78 MMHG | BODY MASS INDEX: 26.69 KG/M2 | WEIGHT: 156.31 LBS | HEIGHT: 64 IN

## 2023-08-28 DIAGNOSIS — M79.671 RIGHT FOOT PAIN: Primary | ICD-10-CM

## 2023-08-28 PROCEDURE — 3075F SYST BP GE 130 - 139MM HG: CPT | Performed by: ORTHOPAEDIC SURGERY

## 2023-08-28 PROCEDURE — 3078F DIAST BP <80 MM HG: CPT | Performed by: ORTHOPAEDIC SURGERY

## 2023-08-28 PROCEDURE — 1160F RVW MEDS BY RX/DR IN RCRD: CPT | Performed by: ORTHOPAEDIC SURGERY

## 2023-08-28 PROCEDURE — 99212 OFFICE O/P EST SF 10 MIN: CPT | Performed by: ORTHOPAEDIC SURGERY

## 2023-08-28 PROCEDURE — 1159F MED LIST DOCD IN RCRD: CPT | Performed by: ORTHOPAEDIC SURGERY

## 2023-08-28 RX ORDER — KETOCONAZOLE 20 MG/ML
SHAMPOO TOPICAL
COMMUNITY

## 2023-08-28 RX ORDER — TRAMADOL HYDROCHLORIDE 50 MG/1
50 TABLET ORAL
COMMUNITY

## 2023-08-28 RX ORDER — CALCIUM POLYCARBOPHIL 625 MG
TABLET ORAL
COMMUNITY

## 2023-08-28 RX ORDER — CLOBETASOL PROPIONATE 0.46 MG/ML
SOLUTION TOPICAL
COMMUNITY
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
## 2023-09-22 DIAGNOSIS — M54.50 ACUTE MIDLINE LOW BACK PAIN WITHOUT SCIATICA: ICD-10-CM

## 2023-09-25 RX ORDER — METHOCARBAMOL 500 MG/1
TABLET, FILM COATED ORAL
Qty: 30 TABLET | Refills: 11 | Status: SHIPPED | OUTPATIENT
Start: 2023-09-25

## 2023-09-25 RX ORDER — METOPROLOL SUCCINATE 50 MG/1
TABLET, EXTENDED RELEASE ORAL
Qty: 90 TABLET | Refills: 0 | Status: SHIPPED | OUTPATIENT
Start: 2023-09-25

## 2023-09-25 RX ORDER — PRAVASTATIN SODIUM 40 MG
TABLET ORAL
Qty: 90 TABLET | Refills: 0 | OUTPATIENT
Start: 2023-09-25

## 2023-09-25 RX ORDER — PRAVASTATIN SODIUM 40 MG
TABLET ORAL
Qty: 90 TABLET | Refills: 0 | Status: SHIPPED | OUTPATIENT
Start: 2023-09-25

## 2023-09-25 RX ORDER — METOPROLOL SUCCINATE 50 MG/1
TABLET, EXTENDED RELEASE ORAL
Qty: 90 TABLET | Refills: 0 | OUTPATIENT
Start: 2023-09-25

## 2023-09-25 NOTE — TELEPHONE ENCOUNTER
LV: 6/7/23  NV: 9/29/23  LF: 3/18/23 pravastatin 90/3; 3/15/23 metoprolol 90/1; methocarbomol 3/15/23 30/0  LL: 3/15/23

## 2023-09-29 ENCOUNTER — LAB (OUTPATIENT)
Dept: INTERNAL MEDICINE | Facility: CLINIC | Age: 78
End: 2023-09-29
Payer: MEDICARE

## 2023-09-29 ENCOUNTER — OFFICE VISIT (OUTPATIENT)
Dept: INTERNAL MEDICINE | Facility: CLINIC | Age: 78
End: 2023-09-29
Payer: MEDICARE

## 2023-09-29 VITALS
DIASTOLIC BLOOD PRESSURE: 68 MMHG | TEMPERATURE: 97.1 F | HEART RATE: 72 BPM | HEIGHT: 64 IN | SYSTOLIC BLOOD PRESSURE: 116 MMHG | BODY MASS INDEX: 26.46 KG/M2 | OXYGEN SATURATION: 98 % | WEIGHT: 155 LBS

## 2023-09-29 DIAGNOSIS — E78.00 PURE HYPERCHOLESTEROLEMIA: Chronic | ICD-10-CM

## 2023-09-29 DIAGNOSIS — I10 ESSENTIAL HYPERTENSION: Primary | Chronic | ICD-10-CM

## 2023-09-29 DIAGNOSIS — Z23 NEED FOR INFLUENZA VACCINATION: ICD-10-CM

## 2023-09-29 PROCEDURE — 3074F SYST BP LT 130 MM HG: CPT | Performed by: INTERNAL MEDICINE

## 2023-09-29 PROCEDURE — 90662 IIV NO PRSV INCREASED AG IM: CPT | Performed by: INTERNAL MEDICINE

## 2023-09-29 PROCEDURE — 1159F MED LIST DOCD IN RCRD: CPT | Performed by: INTERNAL MEDICINE

## 2023-09-29 PROCEDURE — 36415 COLL VENOUS BLD VENIPUNCTURE: CPT | Performed by: INTERNAL MEDICINE

## 2023-09-29 PROCEDURE — 80053 COMPREHEN METABOLIC PANEL: CPT | Performed by: INTERNAL MEDICINE

## 2023-09-29 PROCEDURE — 99214 OFFICE O/P EST MOD 30 MIN: CPT | Performed by: INTERNAL MEDICINE

## 2023-09-29 PROCEDURE — G0008 ADMIN INFLUENZA VIRUS VAC: HCPCS | Performed by: INTERNAL MEDICINE

## 2023-09-29 PROCEDURE — 3078F DIAST BP <80 MM HG: CPT | Performed by: INTERNAL MEDICINE

## 2023-09-29 PROCEDURE — 80061 LIPID PANEL: CPT | Performed by: INTERNAL MEDICINE

## 2023-09-29 PROCEDURE — 1160F RVW MEDS BY RX/DR IN RCRD: CPT | Performed by: INTERNAL MEDICINE

## 2023-09-29 RX ORDER — PRAVASTATIN SODIUM 40 MG
40 TABLET ORAL DAILY
Qty: 90 TABLET | Refills: 0 | Status: CANCELLED | OUTPATIENT
Start: 2023-09-29

## 2023-09-29 RX ORDER — METOPROLOL SUCCINATE 50 MG/1
50 TABLET, EXTENDED RELEASE ORAL DAILY
Qty: 90 TABLET | Refills: 0 | Status: CANCELLED | OUTPATIENT
Start: 2023-09-29

## 2023-09-29 NOTE — PROGRESS NOTES
Chief Complaint  Hyperlipidemia, Hypertension, Med Refill, and weight issue (discuss)    Subjective          Cindy Phillips presents to [facility]  History of Present Illness    HTN- doing well w/ metoprolol; does have a cuff but has not checked at home. She is trying to lose weight, doing just 2 meals a day. Tries to get meat or cheese w/ each meal, around noon and dinner. Wondering if she should increase her protein    Hyperlipidemia- she is fasting today    FH of hereditary spastic paraparesis - she will be seeing neurology in 10/23. She has had episodes where legs give out - happened recently.   Her son is getting some therapy and a medication, but she is not sure what  She has a walker and canes that she uses  She has done PT in the past and tried to keep up w/ exercises        Current Outpatient Medications:     alendronate (FOSAMAX) 70 MG tablet, Take 1 tablet by mouth Every 7 (Seven) Days., Disp: 12 tablet, Rfl: 3    Calcium Carbonate-Vitamin D (CALCIUM-D PO), Take  by mouth., Disp: , Rfl:     clobetasol (TEMOVATE) 0.05 % external solution, , Disp: , Rfl:     fluticasone (FLONASE) 50 MCG/ACT nasal spray, 2 sprays into the nostril(s) as directed by provider Daily., Disp: 16 g, Rfl: 11    ketoconazole (NIZORAL) 2 % shampoo, , Disp: , Rfl:     methocarbamol (ROBAXIN) 500 MG tablet, TAKE 1 TABLET BY MOUTH 3 TIMES A DAY AS NEEDED FOR BACK PAIN, Disp: 30 tablet, Rfl: 11    metoprolol succinate XL (TOPROL-XL) 50 MG 24 hr tablet, TAKE 1 TABLET BY MOUTH EVERY DAY, Disp: 90 tablet, Rfl: 0    naproxen (EC NAPROSYN) 500 MG EC tablet, Take 1 tablet by mouth 2 (Two) Times a Day With Meals. (Patient taking differently: Take 1 tablet by mouth 2 (Two) Times a Day As Needed.), Disp: 60 tablet, Rfl: 0    omeprazole (priLOSEC) 20 MG capsule, Take 1 capsule by mouth Every Morning., Disp: 90 capsule, Rfl: 3    polycarbophil 625 MG tablet tablet, , Disp: , Rfl:     pravastatin (PRAVACHOL) 40 MG  "tablet, TAKE 1 TABLET BY MOUTH EVERY DAY, Disp: 90 tablet, Rfl: 0    traMADol (ULTRAM) 50 MG tablet, Take 1 tablet by mouth., Disp: , Rfl:    The following portions of the patient's history were reviewed and updated as appropriate: allergies, current medications, past family history, past medical history, past social history, past surgical history and problem list.     Objective   Vital Signs:   /68 (BP Location: Right arm, Patient Position: Sitting)   Pulse 72   Temp 97.1 °F (36.2 °C) (Infrared)   Ht 163.6 cm (64.4\")   Wt 70.3 kg (155 lb)   SpO2 98%   BMI 26.28 kg/m²       Physical exam  Constitutional: oriented to person, place, and time.  well-developed and well-nourished. No distress.   HENT:   Head: Normocephalic and atraumatic.   Eyes: Conjunctivae and EOM are normal.   Cardiovascular: Normal rate, regular rhythm and normal heart sounds.  Exam reveals no gallop and no friction rub.    No murmur heard.  Pulmonary/Chest: Effort normal and breath sounds normal. No respiratory distress.   no wheezes.   Neurological:  alert and oriented to person, place, and time. Gait - uses 2 canes and is slow  Skin: Skin is warm and dry. not diaphoretic.   Psychiatric:  normal mood and affect. behavior is normal. Judgment and thought content normal.      Result Review :   The following data was reviewed by: Janey Adan MD on 09/29/2023:  CMP          3/15/2023    15:50   CMP   Glucose 100    BUN 13    Creatinine 0.74    EGFR 84.0    Sodium 142    Potassium 4.6    Chloride 105    Calcium 9.8    Total Protein 7.7    Albumin 4.2    Globulin 3.5    Total Bilirubin 0.4    Alkaline Phosphatase 70    AST (SGOT) 30    ALT (SGPT) 16    Albumin/Globulin Ratio 1.2    BUN/Creatinine Ratio 17.6    Anion Gap 11.0      CBC          3/15/2023    15:50   CBC   WBC 6.59    RBC 4.61    Hemoglobin 14.3    Hematocrit 42.0    MCV 91.1    MCH 31.0    MCHC 34.0    RDW 14.1    Platelets 335      Lipid Panel          3/15/2023    15:50 "   Lipid Panel   Total Cholesterol 184    Triglycerides 82    HDL Cholesterol 66    VLDL Cholesterol 15    LDL Cholesterol  103    LDL/HDL Ratio 1.54      TSH          3/15/2023    15:50   TSH   TSH 1.330      Lab Results   Component Value Date    VCGL14CF 40.0 01/11/2021    [id number] 432 07/13/2020               Assessment and Plan    Diagnoses and all orders for this visit:    1. Essential hypertension (Primary)-good control.  Discussed if she does get lightheaded she can check blood pressure and if low, we can cut back on the metoprolol but since blood pressure looks good today I would recommend continuing current dose for now  We did discuss protein in the diet and she does get protein with each meal.  Discussed she could add a protein shake between her 2 meals of the day    2. Pure hypercholesterolemia-check levels today.  -     Comprehensive Metabolic Panel; Future  -     Lipid Panel; Future    3. Need for influenza vaccination  -     Fluzone High-Dose 65+yrs ([id number])    She has neurology appointment next month to evaluate for the spastic hereditary paraparesis    Follow Up   Return in about 6 months (around 3/29/2024) for Medicare Wellness.  Patient was given instructions and counseling regarding her condition or for health maintenance advice. Please see specific information pulled into the AVS if appropriate.

## 2023-09-30 LAB
ALBUMIN SERPL-MCNC: 4.2 G/DL (ref 3.5–5.2)
ALBUMIN/GLOB SERPL: 1.4 G/DL
ALP SERPL-CCNC: 71 U/L (ref 39–117)
ALT SERPL W P-5'-P-CCNC: 9 U/L (ref 1–33)
ANION GAP SERPL CALCULATED.3IONS-SCNC: 12 MMOL/L (ref 5–15)
AST SERPL-CCNC: 21 U/L (ref 1–32)
BILIRUB SERPL-MCNC: 0.4 MG/DL (ref 0–1.2)
BUN SERPL-MCNC: 21 MG/DL (ref 8–23)
BUN/CREAT SERPL: 30.4 (ref 7–25)
CALCIUM SPEC-SCNC: 9.9 MG/DL (ref 8.6–10.5)
CHLORIDE SERPL-SCNC: 107 MMOL/L (ref 98–107)
CHOLEST SERPL-MCNC: 212 MG/DL (ref 0–200)
CO2 SERPL-SCNC: 25 MMOL/L (ref 22–29)
CREAT SERPL-MCNC: 0.69 MG/DL (ref 0.57–1)
EGFRCR SERPLBLD CKD-EPI 2021: 90.1 ML/MIN/1.73
GLOBULIN UR ELPH-MCNC: 3.1 GM/DL
GLUCOSE SERPL-MCNC: 103 MG/DL (ref 65–99)
HDLC SERPL-MCNC: 69 MG/DL (ref 40–60)
LDLC SERPL CALC-MCNC: 131 MG/DL (ref 0–100)
LDLC/HDLC SERPL: 1.88 {RATIO}
POTASSIUM SERPL-SCNC: 5 MMOL/L (ref 3.5–5.2)
PROT SERPL-MCNC: 7.3 G/DL (ref 6–8.5)
SODIUM SERPL-SCNC: 144 MMOL/L (ref 136–145)
TRIGL SERPL-MCNC: 67 MG/DL (ref 0–150)
VLDLC SERPL-MCNC: 12 MG/DL (ref 5–40)

## 2023-10-25 ENCOUNTER — OFFICE VISIT (OUTPATIENT)
Dept: NEUROLOGY | Facility: CLINIC | Age: 78
End: 2023-10-25
Payer: MEDICARE

## 2023-10-25 VITALS
BODY MASS INDEX: 25.61 KG/M2 | HEIGHT: 64 IN | HEART RATE: 58 BPM | SYSTOLIC BLOOD PRESSURE: 126 MMHG | WEIGHT: 150 LBS | OXYGEN SATURATION: 95 % | DIASTOLIC BLOOD PRESSURE: 66 MMHG

## 2023-10-25 DIAGNOSIS — R26.89 POOR BALANCE: Chronic | ICD-10-CM

## 2023-10-25 DIAGNOSIS — R29.6 FREQUENT FALLS: ICD-10-CM

## 2023-10-25 DIAGNOSIS — R26.81 UNSTEADY GAIT: Primary | ICD-10-CM
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
## 2023-11-30 ENCOUNTER — TREATMENT (OUTPATIENT)
Dept: PHYSICAL THERAPY | Facility: CLINIC | Age: 78
End: 2023-11-30
Payer: MEDICARE

## 2023-11-30 DIAGNOSIS — R26.89 POOR BALANCE: Primary | Chronic | ICD-10-CM

## 2023-11-30 PROCEDURE — 97162 PT EVAL MOD COMPLEX 30 MIN: CPT | Performed by: PHYSICAL THERAPIST

## 2023-11-30 PROCEDURE — 97110 THERAPEUTIC EXERCISES: CPT | Performed by: PHYSICAL THERAPIST

## 2023-11-30 NOTE — PROGRESS NOTES
Physical Therapy Initial Evaluation and Plan of Care      Patient: Cindy Phillips   : 1945  Diagnosis/ICD-10 Code:  Poor balance [R26.89]  Referring practitioner: ANAHY Lew  Date of Initial Visit: 2023  Today's Date: 2023  Patient seen for 1 sessions  [facility]  [address] [facility] [address]         Subjective Questionnaire: n/a  TU.00 sec  10 M: 26.30 sec  FOSTER/56       Exercise 1  Exercise Name 1: Issued and performed HEP, see for details  Time: 10 min       Subjective Evaluation    History of Present Illness  Mechanism of injury: Pt reports 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  Pt reports that she has always fallen down a lot even when she was young.  Pt reports that in the  she had issues with her B LEs and wore compression bracing that helped.  Pt would walk a lot of the [place] and wore her knee out and they determined that the arch in her left foot had fallen.  Pt has used a cane for a long time and a little over a year used a rollator.  Pt had B medial partial knee replacements. Pt stumbles when its uneven and collapses to the ground without any warning.  Pt states that three weeks ago her LE's gave out after watching a movie in the theater with her LE's elevated.  Pt has lost 35lbs this past year.  Pt has had 3 falls this past year. Pt requires assist get up after a fall. Pt has two children, son and dtr. Pt currently does sitting exercises.      Patient Occupation: retired teacher Quality of life: good    Pain  Location: L LE minor discomfort    Social Support  Patient lives at: main floor with basement; 4 steps to garage with 1 HR; 14 steps to basement with 1HR.  Lives with: spouse    Hand dominance: right    Patient Goals  Patient goals for therapy: improved balance and increased strength           Objective          Static Posture     Ankle/Foot   Ankle/Foot (Left):  Calcaneovalgus, hallux valgus, planovalgus and pronated.     Comments  L genu valgus    Neurological Testing     Sensation     Ankle/Foot   Left Ankle/Foot   Intact: light touch and proprioception    Right Ankle/Foot   Intact: light touch and proprioception     Active Range of Motion     Additional Active Range of Motion Details  L knee rests in 16 degrees of flexion  L knee in in 8 degrees of genu valgus      Strength/Myotome Testing     Left Hip   Planes of Motion   Flexion: 3  Extension: 3-  Abduction: 3-  Adduction: 3-    Right Hip   Planes of Motion   Flexion: 4-  Extension: 4-  Abduction: 4-  Adduction: 4-    Left Knee   Flexion: 3-  Extension: 2-    Right Knee   Flexion: 4-  Extension: 4-    Left Ankle/Foot   Dorsiflexion: 3-  Plantar flexion: 3-    Right Ankle/Foot   Dorsiflexion: 4  Plantar flexion: 4    Ambulation     Ambulation: Stairs   Ascend stairs: contact guard assist  Pattern: non-reciprocal  Railings: one rail  Descend stairs: contact guard assist  Pattern: non-reciprocal  Railings: one rail    Additional Stairs Ambulation Details  SC    Observational Gait   Increased left swing time. Decreased walking speed, stride length, left stance time and left step length.   Left arm swing: high guard  Right arm swing comments: holding SC    Additional Observational Gait Details  Occasional Decreased L LE clearance with swing; flexed posture; decreased L LE WB in stance    Functional Assessment     Comments  Pt unable to perform sit to stand without UE A        PT Neuro         Assessment & Plan       Assessment  Impairments: abnormal gait, activity intolerance, impaired balance, impaired physical strength, lacks appropriate home exercise program and safety issue   Functional limitations: lifting, walking, pulling, standing, stooping, reaching behind back and reaching overhead   Assessment details: Pt presents with evolving symptoms following imbalance diagnosis.  Pt to benefit from skilled PT services to  "improve gait, balance, strength and overall functional mobility.  Pt may benefit from L AFO/toe off brace secondary to L DF weakness and occasional L LE catching with swing.   Pt to benefit from skilled PT services to improve overall functional mobility.  Prognosis: fair    Goals  Plan Goals: STG (7 visits)  1. Patient to improve FOSTER balance score to >/= 32 /56 to decrease client's risk of falls.  2. Patient to perform TUG within 19 sec without LOB for improved functional mobility.  3. Patient to ambulate 10 meters with AD within 23 sec without LOB for improved gait ethel and functional mobility.  4. Patient to ascend/descend 12 steps with B HRs and recip pattern without LOB for improved functional mobility at home.     LTG (14 visits)  1. Patient to improve FOSTER balance score to >/= 40 /56 to decrease client's risk of falls.  2. Patient to perform TUG within 18 sec without LOB for improved functional mobility.  3. Patient to ambulate 10 meters with AD within 20 sec without LOB for improved gait ethel and functional mobility.  4. Patient to perform stair climbing 12 steps with 1HR and recip pattern without LOB for improved functional mobility.   5. Pt to perform sit to stand without UE A from at least 20\" height 3/3 trails without LOB for improved functional mobility.  6. Patient to be I with HEP.    Plan  Therapy options: will be seen for skilled therapy services  Planned modality interventions: electrical stimulation/Russian stimulation  Planned therapy interventions: gait training, functional ROM exercises, home exercise program, neuromuscular re-education, strengthening, stretching, therapeutic activities, orthotic fitting/training, abdominal trunk stabilization and balance/weight-bearing training  Frequency: 1x week  Duration in visits: 14  Treatment plan discussed with: patient  Plan details: Patient will be seen 1x/wk x 14 visits with treatment to include strengthening, stretching, functional e-stim " therapy, neuromuscular re-education, balance, gait and endurance training.         Timed:  Manual Therapy:    0     mins  23042;  Therapeutic Exercise:    10     mins  14618;     Neuromuscular Shemar:    0    mins  92130;    Therapeutic Activity:     0     mins  31258;     Gait Trainin     mins  02766;     Ultrasound:     0     mins  03393;    Electrical Stimulation:    0     mins  50247 ( );    Untimed:  Electrical Stimulation:    0     mins  84708 ( );  Mechanical Traction:    0     mins  03979;     Timed Treatment:   10   mins   Total Treatment:     45   mins    PT SIGNATURE: Umu Rojo, PT   DATE TREATMENT INITIATED: 2023    Initial Certification  Certification Period: 2023thru2024  I certify that the therapy services are furnished while this patient is under my care.  The services outlined above are required by this patient, and will be reviewed every 90 days.     PHYSICIAN: Renetta Magallanes APRN  NPI: [id number]            DATE:                               Please sign and return via fax to [phone].. Thank you, [facility].

## 2023-12-07 ENCOUNTER — TREATMENT (OUTPATIENT)
Dept: PHYSICAL THERAPY | Facility: CLINIC | Age: 78
End: 2023-12-07
Payer: MEDICARE

## 2023-12-07 DIAGNOSIS — R26.89 POOR BALANCE: Primary | ICD-10-CM

## 2023-12-07 PROCEDURE — 97112 NEUROMUSCULAR REEDUCATION: CPT | Performed by: PHYSICAL THERAPIST

## 2023-12-07 PROCEDURE — 97110 THERAPEUTIC EXERCISES: CPT | Performed by: PHYSICAL THERAPIST

## 2023-12-07 NOTE — PROGRESS NOTES
"Physical Therapy Daily Treatment Note  Visit: 2  Date of Initial Visit: Type: THERAPY  Noted: 2023  Poor balance [R26.89]  Cindy Phillips reports: \"I was really bad off after the first time I was here.  My body hurt all over and my upper back went out.  It hurt for about three days.  I don't think I can do the second of the exercises you gave me b/c I just fall.\"    Subjective Evaluation    Pain  Current pain ratin           Objective   See Exercise, Manual, and Modality Logs for complete treatment.    Exercise 1  Exercise Name 1: NuStep B UE/LEs  Equipment/Resistance 1: level 5  Sets/Reps 1: 43 spm  Time: 8 min  Exercise 2  Exercise Name 2: St balance on trampoline without UE A with B LE marching, knee flexion, hip abduction; B full turns in standing on trampoline  Equipment/Resistance 2: trampoline; min A  Sets/Reps 2: 10  Exercise 3  Exercise Name 3: St balance without UE A and fwd stepping up onto/off blue foam; B sidestepping up onto and over to the other side of blue foam  Equipment/Resistance 3: blue foam; min A  Sets/Reps 3: 10       PT Neuro          Assessment & Plan       Assessment  Assessment details: Pt educated to perform HEP in staggered standing and not tandem for second exercise.  Pt requires min A and extra time to perform standing balance.  Pt demonstrates turning towards the side with sidestepping and requires VCing to stay more forward facing.  Pt to benefit from skilled PT services to meet goals and improve overall functional mobility.    Plan  Plan details: Pt to benefit from skilled PT services to improve gait, balance, strength, and overall functional mobility.          Manual Therapy:     0     mins  46489;  Therapeutic Exercise:     15     mins  84635;     Neuromuscular Shemar:     30    mins  54969;    Therapeutic Activity:      0     mins  92944;     Gait Trainin     mins  64768;     Ultrasound:      0     mins  14304;    Electrical Stimulation:     0     mins  96961 " ( );  Dry Needling      0     mins self-pay  Traction      0     mins 71318  Canalith Repositioning    0     mins 09020      Timed Treatment:   45   mins   Total Treatment:     45   mins    Umu Rojo, PT  [place] License #: [id number]    Physical Therapist

## 2023-12-14 ENCOUNTER — TREATMENT (OUTPATIENT)
Dept: PHYSICAL THERAPY | Facility: CLINIC | Age: 78
End: 2023-12-14
Payer: MEDICARE

## 2023-12-14 DIAGNOSIS — R26.89 POOR BALANCE: Primary | ICD-10-CM

## 2023-12-14 PROCEDURE — 97110 THERAPEUTIC EXERCISES: CPT | Performed by: PHYSICAL THERAPIST

## 2023-12-14 PROCEDURE — 97112 NEUROMUSCULAR REEDUCATION: CPT | Performed by: PHYSICAL THERAPIST

## 2023-12-14 NOTE — PROGRESS NOTES
"Physical Therapy Daily Treatment Note  Visit: 3  Date of Initial Visit: Type: THERAPY  Noted: 2023  Poor balance [R26.89]  Cindy Phillips reports: \"I had a fall this morning.  My left leg gave out and I fell down beside the bed.  My  had to help get me up from behind b/c I can't get on my knees.\"    Subjective Evaluation    Pain  Current pain ratin           Objective   See Exercise, Manual, and Modality Logs for complete treatment.    Exercise 1  Exercise Name 1: NuStep B UE/LEs  Equipment/Resistance 1: level 5  Sets/Reps 1: 46 spm  Time: 8 min  Exercise 2  Exercise Name 2: St balance on blue foam with alternating tapping step in front without UE A; fwd stepping up onto/off step without UE A  Equipment/Resistance 2: blue foam; 8\" step; min A  Sets/Reps 2: 10  Exercise 3  Exercise Name 3: St balance on blue foam with sidestepping up onto/off 8\" step without UE A; pt having to grab // bars for support 50% of the time  Equipment/Resistance 3: blue foam; 8\" step; min A  Sets/Reps 3: 6  Exercise 4  Exercise Name 4: St balance with alternating tapping cone in front  Equipment/Resistance 4: two cones; min A  Sets/Reps 4: 10  Exercise 5  Exercise Name 5: Seated stool pull for B hamstring strengthening  Sets/Reps 5: 3 min       PT Neuro          Assessment & Plan       Assessment  Assessment details: Pt requires min A with standing dynamic balance with LOB up to 50% of the time.  Pts R knee \"gave out\" with B sidestepping to step from blue foam.  Pt requires VCing to perform appropriate wt shift for cone tapping.  Pt requires several seated rest breaks secondary to B LE fatigue.  Pt to benefit from skilled PT services to improve overall functional mobility.    Plan  Plan details: Pt to benefit from skilled PT services to improve gait, balance, strength, and overall functional mobility.          Manual Therapy:     0     mins  20904;  Therapeutic Exercise:     15     mins  84028;     Neuromuscular Shemar:     30 "    mins  00253;    Therapeutic Activity:      0     mins  69297;     Gait Trainin     mins  63815;     Ultrasound:      0     mins  03990;    Electrical Stimulation:     0     mins  03255 ( );  Dry Needling      0     mins self-pay  Traction      0     mins 33883  Canalith Repositioning    0     mins 14417      Timed Treatment:   45   mins   Total Treatment:     45   mins    Umu Rojo, PT  [place] License #: [id number]    Physical Therapist

## 2023-12-21 ENCOUNTER — TREATMENT (OUTPATIENT)
Dept: PHYSICAL THERAPY | Facility: CLINIC | Age: 78
End: 2023-12-21
Payer: MEDICARE

## 2023-12-21 DIAGNOSIS — R26.89 POOR BALANCE: Primary | ICD-10-CM

## 2023-12-21 PROCEDURE — 97110 THERAPEUTIC EXERCISES: CPT | Performed by: PHYSICAL THERAPIST

## 2023-12-21 PROCEDURE — 97112 NEUROMUSCULAR REEDUCATION: CPT | Performed by: PHYSICAL THERAPIST

## 2023-12-21 NOTE — PROGRESS NOTES
"Physical Therapy Daily Treatment Note  Visit: 4  Date of Initial Visit: Type: THERAPY  Noted: 2023  Poor balance [R26.89]  Cindy Phillips reports: \"I'm gaining more confidence but I still have to stomp my foot when I get up to make for sure I don't lose my balance.\"    Subjective Evaluation    Pain  Current pain ratin           Objective   See Exercise, Manual, and Modality Logs for complete treatment.    Exercise 1  Exercise Name 1: NuStep B UE/LEs  Equipment/Resistance 1: level 6  Sets/Reps 1: 44 spm  Time: 8 min  Exercise 2  Exercise Name 2: B fwd and rotational lunge to BOSU without UE A; hold fwd lunge with pressing bar overhead and then holding bar and performing B trunk rotation  Equipment/Resistance 2: BOSU; 2# bar; min/mod A  Sets/Reps 2: 10  Exercise 3  Exercise Name 3: St balance on rocker board R/L and fwd/bwd without UE A; performing small knee bend without UE A  Equipment/Resistance 3: rocker board; min A  Sets/Reps 3: 10  Exercise 4  Exercise Name 4: B LE leg press  Equipment/Resistance 4: total gym; level9  Time 4: 2 min      PT Neuro          Assessment & Plan       Assessment  Assessment details: Pt requires min/mod A with standing dynamic balance to BOSU with LOB up to 40% of the time.  Pt requires extra time to get onto/off total gym.  Pt has difficulty with performing small knee bend on rocker board secondary to L LE weakness.  Pt to benefit from skilled PT services to meet goals.    Plan  Plan details: Pt to benefit from skilled PT services to improve gait, balance, strength, and overall functional mobility.          Manual Therapy:     0     mins  31589;  Therapeutic Exercise:     15     mins  89384;     Neuromuscular Shemar:     30    mins  93276;    Therapeutic Activity:      0     mins  01638;     Gait Trainin     mins  60189;     Ultrasound:      0     mins  15661;    Electrical Stimulation:     0     mins  48222 ( );  Dry Needling      0     mins " self-pay  Traction      0     mins 84832  Canalith Repositioning    0     mins 65452      Timed Treatment:   45   mins   Total Treatment:     45   mins    Umu Rojo, PT  [place] License #: [id number]    Physical Therapist

## 2023-12-29 ENCOUNTER — TREATMENT (OUTPATIENT)
Dept: PHYSICAL THERAPY | Facility: CLINIC | Age: 78
End: 2023-12-29
Payer: MEDICARE

## 2023-12-29 DIAGNOSIS — R26.89 POOR BALANCE: Primary | ICD-10-CM

## 2023-12-29 PROCEDURE — 97110 THERAPEUTIC EXERCISES: CPT | Performed by: PHYSICAL THERAPIST

## 2023-12-29 PROCEDURE — 97112 NEUROMUSCULAR REEDUCATION: CPT | Performed by: PHYSICAL THERAPIST

## 2023-12-29 NOTE — PROGRESS NOTES
"Physical Therapy Daily Treatment Note  Visit: 5  Date of Initial Visit: Type: THERAPY  Noted: 2023  Poor balance [R26.89]  Cindy Phillips reports: \"I was here on time and I checked in but you didn't know I was here.  I shouldn't have to wait 15 minutes.\"    Subjective Evaluation    Pain  Current pain ratin           Objective   See Exercise, Manual, and Modality Logs for complete treatment.    Exercise 1  Exercise Name 1: NuStep B UE/LEs  Equipment/Resistance 1: level 6  Sets/Reps 1: 44 spm  Time: 8 min  Exercise 2  Exercise Name 2: Ambulation holding purple ball fwd; fwd walking with bounce/catch; toss/catch ball; retroambulation with toss/catch ball  Equipment/Resistance 2: CGA  Sets/Reps 2: 50' x 2 of each  Exercise 3  Exercise Name 3: B sidestepping without AD with VCing to increase B step length  Equipment/Resistance 3: CGA  Sets/Reps 3: 30'         PT Neuro          Assessment & Plan       Assessment  Assessment details: Pt requires CGA and VCing to ambulate without AD.  Pt demonstrates decreased B step length, wide RUPESH and pt trying to grab onto wall for UE A.  Pt is able to increase B step length with VCing but requires increased time to perform. Pt to benefit from skilled PT services to meet goals.    Plan  Plan details: Pt to benefit from skilled PT services to improve gait, balance, strength, and overall functional mobility.          Manual Therapy:     0     mins  28086;  Therapeutic Exercise:     10     mins  47557;     Neuromuscular Shemar:     20    mins  77117;    Therapeutic Activity:      0     mins  94637;     Gait Trainin     mins  92273;     Ultrasound:      0     mins  60861;    Electrical Stimulation:     0     mins  17339 ( );  Dry Needling      0     mins self-pay  Traction      0     mins 90409  Canalith Repositioning    0     mins 95404      Timed Treatment:   30   mins   Total Treatment:     30   mins    Umu Rojo, PT  [place] License #: [id number]    Physical " Therapist

## 2024-01-03 NOTE — TELEPHONE ENCOUNTER
Sent in 30 day refill only   
metoprolol 50mg  1 x daily   None left  3 mo supply    pravactatin 40mg  1 x daily   1 left  3 mo supply    juancho warren  
stated

## 2024-01-11 ENCOUNTER — TREATMENT (OUTPATIENT)
Dept: PHYSICAL THERAPY | Facility: CLINIC | Age: 79
End: 2024-01-11
Payer: MEDICARE

## 2024-01-11 DIAGNOSIS — R26.89 POOR BALANCE: Primary | ICD-10-CM

## 2024-01-11 PROCEDURE — 97112 NEUROMUSCULAR REEDUCATION: CPT | Performed by: PHYSICAL THERAPIST

## 2024-01-11 PROCEDURE — 97110 THERAPEUTIC EXERCISES: CPT | Performed by: PHYSICAL THERAPIST

## 2024-01-11 NOTE — PROGRESS NOTES
"PT Progress Note        Patient: Cindy Phillips   : 1945  Diagnosis/ICD-10 Code:  Poor balance [R26.89]  Referring practitioner: ANAHY Lew  Date of Initial Visit: Type: THERAPY  Noted: 2023  Today's Date: 2024  Patient seen for 6 sessions      Subjective:   Cindy Phillips reports: \"I was sore earlier this week when I bent over and did some things while sitting on this walker. I was so sore the next day after doing that.\"  Subjective Questionnaire: n/a  Clinical Progress: improved  Home Program Compliance: Yes  Treatment has included: therapeutic exercise, neuromuscular re-education, therapeutic activity, and gait training    Subjective Evaluation    Pain  Current pain ratin       Objective     Exercise 1  Exercise Name 1: Shamar ACEVEDO UE/LEs  Equipment/Resistance 1: level 6  Sets/Reps 1: 56 spm  Time: 8 min  Exercise 2  Exercise Name 2: Re-assessment completed, see for details  Exercise 3  Exercise Name 3: Issued and performed HEP, see for details.         Functional Testing  Functional Tests Options: 10 Meter Walk, Timed Up and Go (TUG), Foster Balance  TU.91 sec  10 M: 22.30 sec  FOSTER/56    PT Neuro         Assessment & Plan       Assessment  Impairments: abnormal gait, activity intolerance, impaired balance, impaired physical strength, lacks appropriate home exercise program and safety issue   Functional limitations: lifting, walking, pulling, standing, stooping, reaching behind back and reaching overhead   Assessment details: Pt met STG for 10 meter walk today with rollator.  Pt issued and performed HEP for strength and balance, see for details. Pt requires VCing to stand more upright with standing strengthening exercises.  Pt to benefit from skilled PT services to improve overall functional mobility.  Prognosis: fair    Goals  Plan Goals: STG (7 visits)  1. Patient to improve FOSTER balance score to >/= 32 /56 to decrease client's risk of falls. ONGOING  2. Patient to " "perform TUG within 19 sec without LOB for improved functional mobility. ONGOING  3. Patient to ambulate 10 meters with AD within 23 sec without LOB for improved gait ethel and functional mobility. MET  4. Patient to ascend/descend 12 steps with B HRs and recip pattern without LOB for improved functional mobility at home. ONGOING    LTG (14 visits)  1. Patient to improve FOSTER balance score to >/= 40 /56 to decrease client's risk of falls. ONGOING  2. Patient to perform TUG within 18 sec without LOB for improved functional mobility. ONGOING  3. Patient to ambulate 10 meters with AD within 20 sec without LOB for improved gait ethel and functional mobility. ONGOING  4. Patient to perform stair climbing 12 steps with 1HR and recip pattern without LOB for improved functional mobility. ONGOING  5. Pt to perform sit to stand without UE A from at least 20\" height 3/3 trails without LOB for improved functional mobility. ONGOING  6. Patient to be I with HEP. ONGOING    Plan  Therapy options: will be seen for skilled therapy services  Planned modality interventions: electrical stimulation/Russian stimulation  Planned therapy interventions: gait training, functional ROM exercises, home exercise program, neuromuscular re-education, strengthening, stretching, therapeutic activities, orthotic fitting/training, abdominal trunk stabilization and balance/weight-bearing training  Frequency: 1x week  Duration in visits: 8  Treatment plan discussed with: patient  Plan details: Patient will be seen 1x/wk x 8 visits with treatment to include strengthening, stretching, functional e-stim therapy, neuromuscular re-education, balance, gait and endurance training.         Visit Diagnoses:    ICD-10-CM ICD-9-CM   1. Poor balance  R26.89 781.99       Progress toward previous goals: Partially Met      Recommendations: Continue as planned  Timeframe: 8 visits  Prognosis to achieve goals: good    PT Signature: Umu Rojo, PT  [place] License " #: [id number]    Based upon review of the patient's progress and continued therapy plan, it is my medical opinion that Cindy Phillips should continue physical therapy treatment at [facility] PHYSICAL THERAPY  [facility] [address] [facility] [address] [phone].    Signature: __________________________________  Renetta Magallanes APRN    Timed:  Manual Therapy:    0     mins  55301;  Therapeutic Exercise:    30     mins  16099;     Neuromuscular Shemar:    15    mins  08777;    Therapeutic Activity:     0     mins  61958;     Gait Trainin     mins  67386;     Ultrasound:     0     mins  35826;    Electrical Stimulation:    0     mins  07110 ( );    Untimed:  Electrical Stimulation:    0     mins  92288 ( );  Mechanical Traction:    0     mins  79094;     Timed Treatment:   45   mins   Total Treatment:     45   mins

## 2024-01-18 ENCOUNTER — TREATMENT (OUTPATIENT)
Dept: PHYSICAL THERAPY | Facility: CLINIC | Age: 79
End: 2024-01-18
Payer: MEDICARE

## 2024-01-18 DIAGNOSIS — R26.89 POOR BALANCE: Primary | ICD-10-CM

## 2024-01-18 PROCEDURE — 97110 THERAPEUTIC EXERCISES: CPT | Performed by: PHYSICAL THERAPIST

## 2024-01-18 PROCEDURE — 97112 NEUROMUSCULAR REEDUCATION: CPT | Performed by: PHYSICAL THERAPIST

## 2024-01-18 NOTE — PROGRESS NOTES
"Physical Therapy Daily Treatment Note  Visit: 7  Date of Initial Visit: Type: THERAPY  Noted: 2023  Poor balance [R26.89]  Cindy Phillips reports: \"I really like the stretches that you have me doing especially the one stretching the back of my leg.  It really helps.\"    Subjective Evaluation    Pain  Current pain ratin         Objective   See Exercise, Manual, and Modality Logs for complete treatment.    Exercise 1  Exercise Name 1: NuStep B UE/LEs  Equipment/Resistance 1: level 6  Sets/Reps 1: 55 spm  Time: 8 min  Exercise 2  Exercise Name 2: fwd stepping one foot in every square; B sidestepping with two feet in each square; pt unable to step towards the left and WB without UE A  Equipment/Resistance 2: agility ladder; min/mod A  Sets/Reps 2: 65 ft x 2 fwd and 20 ft to the right and 0 ft to the left  Exercise 3  Exercise Name 3: St balance with fwd stepping up onto/off blue foam ; B sidestepping up onto and over blue foam without UE A  Equipment/Resistance 3: blue foam; min A with pt grabbing for rollator 50% of the time secondary to imbalance  Sets/Reps 3: 10 fwd; 2 sidestepping  Exercise 4  Exercise Name 4: Seated stool pull  Time 4: 3 min  Exercise 5  Exercise Name 5: Seated B gastroc stretch with stretching strap  Equipment/Resistance 5: 30 sec x 2 of each        PT Neuro          Assessment & Plan       Assessment  Assessment details: Pt requires min/mod A with agility ladder stepping with pt unable to perform long enough step to put one foot in each square.  Pt unable to perform L sidestepping onto blue foam and along agility ladder secondary to L LE weakness.  Pt requires several seated rest breaks to L LE fatigue.  Pt reports improved calf discomfort following stretching.  Pt educated that she can purchase her own stretching strap through [employer].  Pt to benefit from skilled PT services to improve overall functional mobility.    Plan  Plan details: Pt to benefit from skilled PT services to improve " gait, balance, strength, and overall functional mobility.          Manual Therapy:     0     mins  44499;  Therapeutic Exercise:     15     mins  92888;     Neuromuscular Shemar:     25    mins  74493;    Therapeutic Activity:      0     mins  78613;     Gait Trainin     mins  77932;     Ultrasound:      0     mins  50419;    Electrical Stimulation:     0     mins  42026 ( );  Dry Needling      0     mins self-pay  Traction      0     mins 62028  Canalith Repositioning    0     mins 86602      Timed Treatment:   40   mins   Total Treatment:     40   mins    Umu Rojo, PT  [place] License #: [id number]    Physical Therapist

## 2024-01-25 ENCOUNTER — TREATMENT (OUTPATIENT)
Dept: PHYSICAL THERAPY | Facility: CLINIC | Age: 79
End: 2024-01-25
Payer: MEDICARE

## 2024-01-25 DIAGNOSIS — R26.89 POOR BALANCE: Primary | ICD-10-CM

## 2024-01-25 PROCEDURE — 97112 NEUROMUSCULAR REEDUCATION: CPT | Performed by: PHYSICAL THERAPIST

## 2024-01-25 PROCEDURE — 97110 THERAPEUTIC EXERCISES: CPT | Performed by: PHYSICAL THERAPIST

## 2024-01-25 NOTE — PROGRESS NOTES
"Physical Therapy Daily Treatment Note  Visit: 8  Date of Initial Visit: Type: THERAPY  Noted: 2023  Poor balance [R26.89]  Cindy Phillips reports: \"I'm doing ok.\"    Subjective Evaluation    Pain  Current pain ratin           Objective   See Exercise, Manual, and Modality Logs for complete treatment.    Exercise 1  Exercise Name 1: NuStep B UE/LEs  Equipment/Resistance 1: level 6  Sets/Reps 1: 57spm  Time: 8 min  Exercise 2  Exercise Name 2: ST balance on rocker board R/L and fwd/bwd without UE A with grabbing clips and placing them overhead and across midline on tband  Equipment/Resistance 2: rocker board; tband; clips; min/mod A  Sets/Reps 2: 10  Exercise 3  Exercise Name 3: Fwd walking along beam; B sidestepping along beam; holding tandem on beam with LE behind stepping fwd/bwd  Equipment/Resistance 3: balance beam; min A  Sets/Reps 3: 10  Exercise 4  Exercise Name 4: Sitting balance on large swiss ball with B LE marching, TKE, hip ab/adduction single LE stepping; mini-crunch; attempted sit to stand with bounce and pt unable to perform  Equipment/Resistance 4: large swiss ball; min A  Sets/Reps 4: 10  Exercise 5  Exercise Name 5: DLP  Equipment/Resistance 5: total gym  Sets/Reps 5: 2  Time 5: 1 min    PT Neuro          Assessment & Plan       Assessment  Assessment details: Pt requires min/mod A with standing balance on rocker board and performing UE activity with LOB up to 40% of the time.  Pt requires VCing to perform activity a little slower to try and work on balance with UE movement.  Pt unable to perform sit to stand from swiss ball with bouncing and mod/max A from therapist.  Pt to benefit from skilled PT services to improve overall functional mobility.    Plan  Plan details: Pt to benefit from skilled PT services to improve gait, balance, strength, and overall functional mobility.          Manual Therapy:     0     mins  19140;  Therapeutic Exercise:     20     mins  75216;     Neuromuscular " Shemar:     25    mins  38158;    Therapeutic Activity:      0     mins  92593;     Gait Trainin     mins  09108;     Ultrasound:      0     mins  31920;    Electrical Stimulation:     0     mins  32381 ( );  Dry Needling      0     mins self-pay  Traction      0     mins 62204  Canalith Repositioning    0     mins 47350      Timed Treatment:   45   mins   Total Treatment:     45   mins    Umu Rojo, PT  [place] License #: [id number]    Physical Therapist

## 2024-02-01 ENCOUNTER — TREATMENT (OUTPATIENT)
Dept: PHYSICAL THERAPY | Facility: CLINIC | Age: 79
End: 2024-02-01
Payer: MEDICARE

## 2024-02-01 DIAGNOSIS — R26.89 POOR BALANCE: Primary | ICD-10-CM

## 2024-02-01 PROCEDURE — 97112 NEUROMUSCULAR REEDUCATION: CPT | Performed by: PHYSICAL THERAPIST

## 2024-02-01 PROCEDURE — 97110 THERAPEUTIC EXERCISES: CPT | Performed by: PHYSICAL THERAPIST

## 2024-02-01 NOTE — PROGRESS NOTES
"Physical Therapy Daily Treatment Note  Visit: 9  Date of Initial Visit: Type: THERAPY  Noted: 2023  Poor balance [R26.89]  Cindy Phillips reports: \"I'm doing well.\"    Subjective Evaluation    Pain  Current pain ratin           Objective   See Exercise, Manual, and Modality Logs for complete treatment.    Exercise 1  Exercise Name 1: NuStep B UE/LEs  Equipment/Resistance 1: level 6  Sets/Reps 1: 53 spm  Time: 8 min  Exercise 2  Exercise Name 2: DLP  Equipment/Resistance 2: total gym  Sets/Reps 2: 2  Time 2: 1 min  Exercise 3  Exercise Name 3: St balance with single LE stepping over/back wooden nicki; repeat with stepping fwd and bwd over two hurdles; repeat wtih fwd stepping over 6 hurdles; B sidestepping over three wooden hurldes  Equipment/Resistance 3: wooden hurdles; min A  Sets/Reps 3: 10  Exercise 4  Exercise Name 4: Fwd/bwd stepping over orange nicki; B sidestepping both feet over nicki  Equipment/Resistance 4: orange hurdles; min A  Sets/Reps 4: 10  Exercise 5  Exercise Name 5: Supine bridge with B LEs on swiss ball  Sets/Reps 5: 2 sets of 10  Exercise 6  Exercise Name 6: B LEs on swiss ball with B lower trunk rotation; B LE's walking ball out/back  Equipment/Resistance 6: swiss ball; medium orange  Sets/Reps 6: 10  Exercise 7  Exercise Name 7: Supine posterior pelvic tilt with 3 sec hold  Equipment/Resistance 7: CGA  Sets/Reps 7: 10       PT Neuro          Assessment & Plan       Assessment  Assessment details: Pt demonstrates increased SLS stability with stepping over/back wooden hurdles with LOB up to 30% of the time.  Pt has increased LOB and difficulty with stepping over larger orange hurdles up to 50% of the time. Pt continues to demonstrate flexed posture with ambulation using rollator.  Pt to benefit from skilled PT services to improve overall functional mobility.    Plan  Plan details: Pt to benefit from skilled PT services to improve gait, balance, strength, and overall functional " mobility.          Manual Therapy:     0     mins  67860;  Therapeutic Exercise:     15     mins  25497;     Neuromuscular Shemar:     25    mins  60736;    Therapeutic Activity:      0     mins  04014;     Gait Trainin     mins  10216;     Ultrasound:      0     mins  20166;    Electrical Stimulation:     0     mins  13425 ( );  Dry Needling      0     mins self-pay  Traction      0     mins 10447  Canalith Repositioning    0     mins 30660      Timed Treatment:   40   mins   Total Treatment:     40   mins    Umu Rojo, PT  [place] License #: [id number]    Physical Therapist

## 2024-02-06 ENCOUNTER — TREATMENT (OUTPATIENT)
Dept: PHYSICAL THERAPY | Facility: CLINIC | Age: 79
End: 2024-02-06
Payer: MEDICARE

## 2024-02-06 DIAGNOSIS — R26.89 POOR BALANCE: Primary | ICD-10-CM

## 2024-02-06 PROCEDURE — 97112 NEUROMUSCULAR REEDUCATION: CPT | Performed by: PHYSICAL THERAPIST

## 2024-02-06 PROCEDURE — 97110 THERAPEUTIC EXERCISES: CPT | Performed by: PHYSICAL THERAPIST

## 2024-02-06 RX ORDER — FLUTICASONE PROPIONATE 50 MCG
2 SPRAY, SUSPENSION (ML) NASAL DAILY
Qty: 16 G | Refills: 11 | Status: SHIPPED | OUTPATIENT
Start: 2024-02-06

## 2024-02-06 NOTE — PROGRESS NOTES
"Physical Therapy Daily Treatment Note  Visit: 10  Date of Initial Visit: Type: THERAPY  Noted: 2023  Poor balance [R26.89]  Cindy Phillips reports: \"I've been working on trying to walk with hitting my heel down.  My balance and walking has been fairly good.\"    Subjective Evaluation    Pain  Current pain ratin           Objective   See Exercise, Manual, and Modality Logs for complete treatment.    Exercise 1  Exercise Name 1: NuStep B UE/LEs  Equipment/Resistance 1: level 6  Sets/Reps 1: spm  Time: 8 min  Exercise 2  Exercise Name 2: B UE A and fwd stepping up onto/off rounded side of BOSU  Equipment/Resistance 2: BOSU; min/mod A  Sets/Reps 2: 10  Exercise 3  Exercise Name 3: St strengthening with B LEs alternating and kicking into swiss ball with B hip extension  Equipment/Resistance 3: green swiss ball  Sets/Reps 3: 10  Exercise 4  Exercise Name 4: Issued and performed HEP, see for details  Equipment/Resistance 4: VCing  Sets/Reps 4: 10  Exercise 4 Completed: Yes  Exercise 4 Home Program: Yes  Exercise 6  Exercise Name 6: B LEs on swiss ball with B lower trunk rotation; B LE's walking ball out/back  Equipment/Resistance 6: swiss ball; medium orange  Sets/Reps 6: 10         PT Neuro          Assessment & Plan       Assessment  Assessment details: Pt requires min A with standing balance activities with B UE A.  Pt demonstrates decreased L LE stability in stance without UE A.  Pt issued HEP for B LE strengthening.  Pt to benefit from skilled PT services to improve overall functional mobility.    Plan  Plan details: Pt to benefit from skilled PT services to improve gait, balance, strength, and overall functional mobility.          Manual Therapy:     0     mins  06705;  Therapeutic Exercise:     30     mins  94213;     Neuromuscular Shemar:     15    mins  72390;    Therapeutic Activity:      0     mins  05616;     Gait Trainin     mins  73804;     Ultrasound:      0     mins  03002;    Electrical " Stimulation:     0     mins  17984 ( );  Dry Needling      0     mins self-pay  Traction      0     mins 43704  Canalith Repositioning    0     mins 15296      Timed Treatment:   45   mins   Total Treatment:     45   mins    Umu Rojo, PT  [place] License #: [id number]    Physical Therapist

## 2024-02-13 ENCOUNTER — TREATMENT (OUTPATIENT)
Dept: PHYSICAL THERAPY | Facility: CLINIC | Age: 79
End: 2024-02-13
Payer: MEDICARE

## 2024-02-13 DIAGNOSIS — R26.89 POOR BALANCE: Primary | ICD-10-CM

## 2024-02-13 PROCEDURE — 97112 NEUROMUSCULAR REEDUCATION: CPT | Performed by: PHYSICAL THERAPIST

## 2024-02-13 PROCEDURE — 97110 THERAPEUTIC EXERCISES: CPT | Performed by: PHYSICAL THERAPIST

## 2024-02-19 ENCOUNTER — TREATMENT (OUTPATIENT)
Dept: PHYSICAL THERAPY | Facility: CLINIC | Age: 79
End: 2024-02-19
Payer: MEDICARE

## 2024-02-19 DIAGNOSIS — R26.89 POOR BALANCE: Primary | ICD-10-CM

## 2024-02-19 PROCEDURE — 97112 NEUROMUSCULAR REEDUCATION: CPT | Performed by: PHYSICAL THERAPIST

## 2024-02-19 PROCEDURE — 97110 THERAPEUTIC EXERCISES: CPT | Performed by: PHYSICAL THERAPIST

## 2024-02-19 PROCEDURE — 97116 GAIT TRAINING THERAPY: CPT | Performed by: PHYSICAL THERAPIST

## 2024-02-19 NOTE — PROGRESS NOTES
"Physical Therapy Daily Treatment Note  Visit: 12  Date of Initial Visit: Type: THERAPY  Noted: 2023  Poor balance [R26.89]  Cindy Phillips reports: \"My legs are tired b/c I worked out three days in a row.\"    Subjective Evaluation    Pain  Current pain ratin           Objective   See Exercise, Manual, and Modality Logs for complete treatment.    Exercise 1  Exercise Name 1: NuStep B UE/LEs  Equipment/Resistance 1: level 6  Sets/Reps 1: 53 spm  Time: 8 min  Exercise 2  Exercise Name 2: Ambulation without AD with walking fwd with EC, B quick head turns, B head turns with keeping head in that direction; retroambulation with EO/EC with VCing to increase B step length; fwd walking with B full turns and keep walking  Equipment/Resistance 2: CGA; VCing to increase step length  Sets/Reps 2: 150 of each  Exercise 3  Exercise Name 3: St balance on blue foam beam in staggered and hold up to 10 sec; standing sideways on beam with EO and then EC; B sidestepping along beam and fwd tandem walking without UE A with LOB up to 60% of the time  Equipment/Resistance 3: blue foam beam; min A  Sets/Reps 3: 10 sec; length of beam x 4  Exercise 4  Exercise Name 4: Issued and performed HEP for swiss ball, see for details.                 PT Neuro          Assessment & Plan       Assessment  Assessment details: Pt requires min A with LOB up to 60% of the time with balance on blue foam beam.  Pt requires VCing to ambulate without UE A with relaxed UE's with arm swing.  Pt demonstrates decreased B step length with retroambulation.  Pt demonstrates decreased cervical mobility with head turns with ambulation secondary to fear of LOB.  Pt to benefit from skilled PT services to improve overall functional mobility.    Plan  Plan details: Pt to benefit from skilled PT services to improve gait, balance, strength, and overall functional mobility.          Manual Therapy:     0     mins  03233;  Therapeutic Exercise:     10     mins  62255;   "   Neuromuscular Shemar:     15    mins  98348;    Therapeutic Activity:      0     mins  23861;     Gait Training:       15     mins  32291;     Ultrasound:      0     mins  48959;    Electrical Stimulation:     0     mins  96757 ( );  Dry Needling      0     mins self-pay  Traction      0     mins 47756  Canalith Repositioning    0     mins 36388      Timed Treatment:   40   mins   Total Treatment:     40   mins    Umu Rojo, PT  [place] License #: [id number]    Physical Therapist

## 2024-02-27 ENCOUNTER — TREATMENT (OUTPATIENT)
Dept: PHYSICAL THERAPY | Facility: CLINIC | Age: 79
End: 2024-02-27
Payer: MEDICARE

## 2024-02-27 DIAGNOSIS — R26.89 POOR BALANCE: Primary | ICD-10-CM

## 2024-02-27 PROCEDURE — 97110 THERAPEUTIC EXERCISES: CPT | Performed by: PHYSICAL THERAPIST

## 2024-02-27 PROCEDURE — 97112 NEUROMUSCULAR REEDUCATION: CPT | Performed by: PHYSICAL THERAPIST

## 2024-02-27 PROCEDURE — 97116 GAIT TRAINING THERAPY: CPT | Performed by: PHYSICAL THERAPIST

## 2024-02-27 NOTE — PROGRESS NOTES
"Physical Therapy Daily Treatment Note  Visit: 13  Date of Initial Visit: Type: THERAPY  Noted: 2023  Poor balance [R26.89]  Cindy Phillips reports: \"I'm doing well.\"    Subjective Evaluation    Pain  Current pain ratin         Objective   See Exercise, Manual, and Modality Logs for complete treatment.    Exercise 1  Exercise Name 1: NuStep B UE/LEs  Equipment/Resistance 1: level 6  Sets/Reps 1: 59 spm  Time: 8 min  Exercise 2  Exercise Name 2: Ambulation with B UE A with emphasis on LE clearance with swing  Equipment/Resistance 2: TM @ 0.9 mph  Time 2: 5 min  Exercise 3  Exercise Name 3: St balance without UE A and fwd stepping up onto/off blue foam; B sidestepping up onto and over to the other side of blue foam  Equipment/Resistance 3: blue foam; CGA  Sets/Reps 3: 10  Exercise 4  Exercise Name 4: St balance with stepping to the rounded side of BOSU with toss/catch ball on/off rebounder  Equipment/Resistance 4: BOSU; rebounder; ball; min A  Sets/Reps 4: 10  Exercise 5  Exercise Name 5: DLP  Equipment/Resistance 5: total gym  Sets/Reps 5: 2  Time 5: 2 min  Exercise 6  Exercise Name 6: Ambulation with rollator with VCing to increase B LE clearance with swing and more upright trunk         PT Neuro          Assessment & Plan       Assessment  Assessment details: Pt requires min A with stepping to rounded side of BOSU with LOB up to 40% of the time.  Pt was able to tolerate ambulation on TM with B UE A with LE scuffing with swing phase 20% of the time.  Pt to benefit from skilled PT services to improve overall functional mobility.    Plan  Plan details: Pt to benefit from skilled PT services to improve gait, balance, strength, and overall functional mobility.          Manual Therapy:     0     mins  76261;  Therapeutic Exercise:     15     mins  24076;     Neuromuscular Shemar:     20    mins  00738;    Therapeutic Activity:      0     mins  49225;     Gait Training:       10     mins  94249;     Ultrasound:      " 0     mins  52324;    Electrical Stimulation:     0     mins  25261 ( );  Dry Needling      0     mins self-pay  Traction      0     mins 65100  Canalith Repositioning    0     mins 43462      Timed Treatment:   45   mins   Total Treatment:     45   mins    Umu Rojo, PT  [place] License #: [id number]    Physical Therapist

## 2024-03-05 ENCOUNTER — TREATMENT (OUTPATIENT)
Dept: PHYSICAL THERAPY | Facility: CLINIC | Age: 79
End: 2024-03-05
Payer: MEDICARE

## 2024-03-05 ENCOUNTER — OFFICE VISIT (OUTPATIENT)
Dept: NEUROLOGY | Facility: CLINIC | Age: 79
End: 2024-03-05
Payer: MEDICARE

## 2024-03-05 VITALS
HEIGHT: 64 IN | SYSTOLIC BLOOD PRESSURE: 120 MMHG | OXYGEN SATURATION: 97 % | HEART RATE: 55 BPM | BODY MASS INDEX: 25.42 KG/M2 | DIASTOLIC BLOOD PRESSURE: 64 MMHG

## 2024-03-05 DIAGNOSIS — R26.89 POOR BALANCE: ICD-10-CM

## 2024-03-05 DIAGNOSIS — R26.81 UNSTEADY GAIT: Primary | ICD-10-CM

## 2024-03-05 DIAGNOSIS — R26.89 POOR BALANCE: Primary | ICD-10-CM

## 2024-03-05 DIAGNOSIS — R29.6 FREQUENT FALLS: ICD-10-CM

## 2024-03-05 PROCEDURE — 1160F RVW MEDS BY RX/DR IN RCRD: CPT | Performed by: PSYCHIATRY & NEUROLOGY

## 2024-03-05 PROCEDURE — 3074F SYST BP LT 130 MM HG: CPT | Performed by: PSYCHIATRY & NEUROLOGY

## 2024-03-05 PROCEDURE — 97112 NEUROMUSCULAR REEDUCATION: CPT | Performed by: PHYSICAL THERAPIST

## 2024-03-05 PROCEDURE — 99214 OFFICE O/P EST MOD 30 MIN: CPT | Performed by: PSYCHIATRY & NEUROLOGY

## 2024-03-05 PROCEDURE — 97110 THERAPEUTIC EXERCISES: CPT | Performed by: PHYSICAL THERAPIST

## 2024-03-05 PROCEDURE — 3078F DIAST BP <80 MM HG: CPT | Performed by: PSYCHIATRY & NEUROLOGY

## 2024-03-05 PROCEDURE — 1159F MED LIST DOCD IN RCRD: CPT | Performed by: PSYCHIATRY & NEUROLOGY
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
## 2024-03-05 NOTE — PROGRESS NOTES
"Physical Therapy Daily Treatment Note  Visit: 14  Date of Initial Visit: Type: THERAPY  Noted: 2023  Poor balance [R26.89]  Cindy Phillips reports: \"I'm doing alright.  I just need a new schedule printed out.\"    Subjective Evaluation    Pain  Current pain ratin           Objective   See Exercise, Manual, and Modality Logs for complete treatment.    Exercise 1  Exercise Name 1: NuStep B UE/LEs  Equipment/Resistance 1: level 6  Sets/Reps 1: 55 spm  Time: 8 min  Exercise 2  Exercise Name 2: Supine core strengthening with small ball in between knees with performing bridge, bringing knees to chest with ball in between knees, upper abdominal crunch, lateral crunch  Equipment/Resistance 2: ball  Sets/Reps 2: 2 sets of 10  Exercise 3  Exercise Name 3: Sitting balance on large red ball with B LE marching, knee ext, hip ab/adduction B LEs together; sit to stand with rollator in front without UE A  Equipment/Resistance 3: large swiss ball; min A  Sets/Reps 3: 10  Exercise 4  Exercise Name 4: St balance with fwd stepping up onto/off blue foam; B sidestepping on top and overa blue foam; one foot on ground and other on blue foam and hold with EC and added head turns R/L and up/down  Equipment/Resistance 4: blue foam; min A  Sets/Reps 4: 10  Exercise 5  Exercise Name 5: Standing balance on blue foam discs staggered with rock and reach with B UE's in line and head lifted; wide RUPESH with feet on blue foam discs with B trunk rotation looking over shoulder  Equipment/Resistance 5: blue foam discs; SBA  Sets/Reps 5: 10         PT Neuro          Assessment & Plan       Assessment  Assessment details: Pt requires min A with standing and sitting balance.  Pt demonstrates ability to perform sit to stand from large swiss ball without UE A with pt bouncing.  Pt continues to demonstrate flexed posture with ambulation using rollator. Pt to benefit from skilled PT services to improve overall functional mobility.    Plan  Plan details: " Pt to benefit from skilled PT services to improve gait, balance, strength, and overall functional mobility.          Manual Therapy:     0     mins  25528;  Therapeutic Exercise:     25     mins  27733;     Neuromuscular Shemar:     15    mins  75509;    Therapeutic Activity:      0     mins  49239;     Gait Trainin     mins  11117;     Ultrasound:      0     mins  22459;    Electrical Stimulation:     0     mins  31731 ( );  Dry Needling      0     mins self-pay  Traction      0     mins 32392  Canalith Repositioning    0     mins 92839      Timed Treatment:   40   mins   Total Treatment:     40   mins    Umu Rojo, PT  [place] License #: [id number]    Physical Therapist

## 2024-03-06 RX ORDER — ALENDRONATE SODIUM 70 MG/1
70 TABLET ORAL
Qty: 12 TABLET | Refills: 0 | Status: SHIPPED | OUTPATIENT
Start: 2024-03-06

## 2024-03-06 RX ORDER — OMEPRAZOLE 20 MG/1
20 CAPSULE, DELAYED RELEASE ORAL EVERY MORNING
Qty: 90 CAPSULE | Refills: 3 | Status: SHIPPED | OUTPATIENT
Start: 2024-03-06

## 2024-03-06 NOTE — TELEPHONE ENCOUNTER
Last office visit with prescribing clinician: 9/29/2023   Last telemedicine visit with prescribing clinician: Visit date not found   Next office visit with prescribing clinician: 5/1/2024

## 2024-03-06 NOTE — TELEPHONE ENCOUNTER
Caller: Cindy Phillips    Relationship: Self    Best call back number: [phone]     Requested Prescriptions:   Requested Prescriptions     Pending Prescriptions Disp Refills    alendronate (FOSAMAX) 70 MG tablet 12 tablet 3     Sig: Take 1 tablet by mouth Every 7 (Seven) Days.    omeprazole (priLOSEC) 20 MG capsule 90 capsule 3     Sig: Take 1 capsule by mouth Every Morning.        Pharmacy where request should be sent: [id number] - [place], [place] - [address] [place] [address] - [phone]  - [phone] FX     Last office visit with prescribing clinician: 9/29/2023   Last telemedicine visit with prescribing clinician: Visit date not found   Next office visit with prescribing clinician: 5/1/2024     Additional details provided by patient:     Does the patient have less than a 3 day supply:  [x] Yes  [] No    Would you like a call back once the refill request has been completed: [] Yes [x] No    If the office needs to give you a call back, can they leave a voicemail: [] Yes [x] No    Geo Rahman Rep   03/06/24 09:32 EST

## 2024-03-12 ENCOUNTER — TREATMENT (OUTPATIENT)
Dept: PHYSICAL THERAPY | Facility: CLINIC | Age: 79
End: 2024-03-12
Payer: MEDICARE

## 2024-03-12 DIAGNOSIS — R26.89 POOR BALANCE: Primary | ICD-10-CM

## 2024-03-12 PROCEDURE — 97112 NEUROMUSCULAR REEDUCATION: CPT | Performed by: PHYSICAL THERAPIST

## 2024-03-12 PROCEDURE — 97110 THERAPEUTIC EXERCISES: CPT | Performed by: PHYSICAL THERAPIST

## 2024-03-12 NOTE — PROGRESS NOTES
"PT Progress Note        Patient: Cindy Phillips   : 1945  Diagnosis/ICD-10 Code:  Poor balance [R26.89]  Referring practitioner: ANAHY Lew  Date of Initial Visit: Type: THERAPY  Noted: 2023  Today's Date: 3/12/2024  Patient seen for 15 sessions      Subjective:   Cindy Phillips reports: \"There is nothing new.\"  Subjective Questionnaire: n/a  Clinical Progress: improved  Home Program Compliance: Yes  Treatment has included: therapeutic exercise, neuromuscular re-education, therapeutic activity, and gait training    Subjective   Objective     Exercise 1  Exercise Name 1: NuStep B UE/LEs  Equipment/Resistance 1: level 6  Sets/Reps 1: 55 spm  Time: 8 min  Exercise 2  Exercise Name 2: Re-assessment completed, see for details  Exercise 3  Exercise Name 3: St balance on trampoline without UE A wtih B LE marching, knee flexion, hip abduction, heel/toe raises; B UE A with jumping up/down and B hip ab/adduction jump; B full turns without UE A  Equipment/Resistance 3: trampoline; CGA/min A  Sets/Reps 3: 15  Exercise 4  Exercise Name 4: Strengthening with single LE stepping up onto/off 6\" step without UE A and repeat with 8\" step with pt needing to use L UE A for L LE stepping up onto/off step  Equipment/Resistance 4: 6\" and 8\" step; min A for L LE stepping onto/off 8\" step  Sets/Reps 4: 10  Exercise 5  Exercise Name 5: B sidestepping with tband around knees with B UE A to increase L LE sidestep length  Equipment/Resistance 5: green tband; SBA  Sets/Reps 5: 5 laps in // bars         Functional Testing  Functional Tests Options: Timed Up and Go (TUG)  TU.04 sec  FOSTER/56  PT Neuro         Assessment & Plan       Assessment  Impairments: abnormal gait, activity intolerance, impaired balance, impaired physical strength, lacks appropriate home exercise program and safety issue   Functional limitations: lifting, walking, pulling, standing, stooping, reaching behind back and reaching overhead " "  Assessment details: Pt did not meet any additional goals today but did improve TUG and FOSTER balance score today.  Pt to benefit from skilled PT services to improve overall functional mobility.  Prognosis: fair    Goals  Plan Goals: STG (7 visits)  1. Patient to improve FOSTER balance score to >/= 32 /56 to decrease client's risk of falls. MET  2. Patient to perform TUG within 19 sec without LOB for improved functional mobility. ONGOING  3. Patient to ambulate 10 meters with AD within 23 sec without LOB for improved gait ethel and functional mobility. MET  4. Patient to ascend/descend 12 steps with B HRs and recip pattern without LOB for improved functional mobility at home. ONGOING    LTG (14 visits)  1. Patient to improve FOSTER balance score to >/= 40 /56 to decrease client's risk of falls. ONGOING  2. Patient to perform TUG within 18 sec without LOB for improved functional mobility. ONGOING  3. Patient to ambulate 10 meters with AD within 20 sec without LOB for improved gait ethel and functional mobility. MET  4. Patient to perform stair climbing 12 steps with 1HR and recip pattern without LOB for improved functional mobility. ONGOING  5. Pt to perform sit to stand without UE A from at least 20\" height 3/3 trails without LOB for improved functional mobility. ONGOING  6. Patient to be I with HEP. ONGOING    Plan  Therapy options: will be seen for skilled therapy services  Planned modality interventions: electrical stimulation/Russian stimulation  Planned therapy interventions: gait training, functional ROM exercises, home exercise program, neuromuscular re-education, strengthening, stretching, therapeutic activities, orthotic fitting/training, abdominal trunk stabilization and balance/weight-bearing training  Frequency: 1x week  Duration in visits: 5  Treatment plan discussed with: patient  Plan details: Patient will be seen 1x/wk x 5 visits with treatment to include strengthening, stretching, functional e-stim " therapy, neuromuscular re-education, balance, gait and endurance training.         Visit Diagnoses:    ICD-10-CM ICD-9-CM   1. Poor balance  R26.89 781.99       Progress toward previous goals: Partially Met    Recommendations: Continue as planned  Timeframe: 5 visits  Prognosis to achieve goals: fair    PT Signature: ETHEL Campbell License #: [id number]    Based upon review of the patient's progress and continued therapy plan, it is my medical opinion that Cindy Phillips should continue physical therapy treatment at [facility]  [facility] [address] [facility] [id number] [phone].    Signature: __________________________________  Renetta Magallanes APRN    Timed:  Manual Therapy:    0     mins  94317;  Therapeutic Exercise:    15     mins  13328;     Neuromuscular Shemar:    30    mins  62215;    Therapeutic Activity:     0     mins  39946;     Gait Trainin     mins  71686;     Ultrasound:     0     mins  21485;    Electrical Stimulation:    0     mins  46968 ( );    Untimed:  Electrical Stimulation:    0     mins  61250 ( );  Mechanical Traction:    0     mins  74948;     Timed Treatment:   45   mins   Total Treatment:     45   mins

## 2024-03-19 ENCOUNTER — TREATMENT (OUTPATIENT)
Dept: PHYSICAL THERAPY | Facility: CLINIC | Age: 79
End: 2024-03-19
Payer: MEDICARE

## 2024-03-19 DIAGNOSIS — R26.89 POOR BALANCE: Primary | ICD-10-CM

## 2024-03-19 PROCEDURE — 97112 NEUROMUSCULAR REEDUCATION: CPT | Performed by: PHYSICAL THERAPIST

## 2024-03-19 PROCEDURE — 97110 THERAPEUTIC EXERCISES: CPT | Performed by: PHYSICAL THERAPIST

## 2024-03-19 NOTE — PROGRESS NOTES
"Physical Therapy Daily Treatment Note  Visit: 16  Date of Initial Visit: Type: THERAPY  Noted: 2023  Poor balance [R26.89]  Cindy Phillips reports: \"My balance has been fair and haven't had any falls.\"    Subjective Evaluation    Pain  No pain reported           Objective   See Exercise, Manual, and Modality Logs for complete treatment.    Exercise 1  Exercise Name 1: NuStep B UE/LEs  Equipment/Resistance 1: level 6  Sets/Reps 1: 55 spm  Time: 8 min  Exercise 2  Exercise Name 2: Ambulation on TM with B UE A with emphasis on L LE clearance with swing  Equipment/Resistance 2: TM @ 0.9 mph; 0% incline  Time 2: 5 min  Exercise 3  Exercise Name 3: DLP  Equipment/Resistance 3: total gym  Sets/Reps 3: 2  Time 3: 2 min  Exercise 4  Exercise Name 4: B heel raises  Equipment/Resistance 4: total gym  Sets/Reps 4: 20  Exercise 5  Exercise Name 5: ST balance on rocker board R/L and fwd/bwd without UE A with grabbing clips and placing them overhead and across midline on tband  Equipment/Resistance 5: rocker board; tband; clips; min A  Sets/Reps 5: 10  Exercise 6  Exercise Name 6: Ambulation over small wooden hurdles without UE A; fwd/bwd diagonal stepping behind and in front of hurdles  Equipment/Resistance 6: 6 wooden hurdles; VCing  Sets/Reps 6: 4 laps of each       PT Neuro          Assessment & Plan       Assessment  Assessment details: Pt requires VCing for L LE clearance with swing phase of gait on TM with B UE A.  Pt demonstrates increased stability with standing balance on rocker board with min A.  Pt to benefit from skilled PT services to meet goals.    Plan  Plan details: Pt to benefit from skilled PT services to improve gait, balance, strength, and overall functional mobility.          Manual Therapy:     0     mins  25151;  Therapeutic Exercise:     15     mins  37427;     Neuromuscular Shemar:     30    mins  27901;    Therapeutic Activity:      0     mins  86462;     Gait Trainin     mins  27715;   "   Ultrasound:      0     mins  53268;    Electrical Stimulation:     0     mins  26653 ( );  Dry Needling      0     mins self-pay  Traction      0     mins 91711  Canalith Repositioning    0     mins 39907      Timed Treatment:   45   mins   Total Treatment:     45   mins    Umu Rojo, PT  [place] License #: [id number]    Physical Therapist

## 2024-04-09 ENCOUNTER — TREATMENT (OUTPATIENT)
Dept: PHYSICAL THERAPY | Facility: CLINIC | Age: 79
End: 2024-04-09
Payer: MEDICARE

## 2024-04-09 DIAGNOSIS — R26.89 POOR BALANCE: Primary | ICD-10-CM

## 2024-04-09 PROCEDURE — 97110 THERAPEUTIC EXERCISES: CPT | Performed by: PHYSICAL THERAPIST

## 2024-04-09 PROCEDURE — 97112 NEUROMUSCULAR REEDUCATION: CPT | Performed by: PHYSICAL THERAPIST

## 2024-04-09 NOTE — PROGRESS NOTES
"Physical Therapy Daily Treatment Note  Visit: 17  Date of Initial Visit: Type: THERAPY  Noted: 2023  Poor balance [R26.89]  Cindy Phillips reports: \"During my break I'm going to go to the center and do some exercises every Monday and Friday.\"    Subjective Evaluation    Pain  Current pain ratin           Objective   See Exercise, Manual, and Modality Logs for complete treatment.    Exercise 1  Exercise Name 1: NuStep B UE/LEs  Equipment/Resistance 1: level 6  Sets/Reps 1: 50 spm  Time: 8 min  Exercise 2  Exercise Name 2: St balance without UE A with standing on blue foam with fwd stepping up onto/off 8\" step without UE A; B sidestep up onto step and then half turn around and step off the other direction; st balance with one foot on blue foam and other on 8\" step and hold with diagonal head turns  Equipment/Resistance 2: blue foam; 8\" step; min A  Sets/Reps 2: 10; 5; 10  Exercise 3  Exercise Name 3: DLP  Equipment/Resistance 3: total gym  Sets/Reps 3: 2  Time 3: 2 min  Exercise 4  Exercise Name 4: Seated stool pull  Time 4: 2 min  Exercise 5  Exercise Name 5: St balance with both feet on floor dot and single LE stepping to dots on floor to increase step length without UE A  Equipment/Resistance 5: 5 dots; CGA  Sets/Reps 5: 5 sets       PT Neuro          Assessment & Plan       Assessment  Assessment details: Pt progressing well towards goals with pt able to perform stepping exercise to floor dots without UE A with CGA and without LOB.  Pt has LOB with standing turn on narrow step both towards the R and L requiring min A.  Pt to benefit from skilled PT services to meet goals.    Plan  Plan details: Pt to benefit from skilled PT services to improve gait, balance, strength, and overall functional mobility.          Manual Therapy:     0     mins  32894;  Therapeutic Exercise:     15     mins  10754;     Neuromuscular Shemar:     25    mins  77867;    Therapeutic Activity:      0     mins  12951;     Gait " Trainin     mins  09166;     Ultrasound:      0     mins  02847;    Electrical Stimulation:     0     mins  61884 ( );  Dry Needling      0     mins self-pay  Traction      0     mins 12573  Canalith Repositioning    0     mins 09671      Timed Treatment:   40   mins   Total Treatment:     40   mins    Umu Rojo, PT  [place] License #: [id number]    Physical Therapist

## 2024-04-15 RX ORDER — PRAVASTATIN SODIUM 40 MG
TABLET ORAL
Qty: 90 TABLET | Refills: 0 | Status: SHIPPED | OUTPATIENT
Start: 2024-04-15

## 2024-04-15 RX ORDER — METOPROLOL SUCCINATE 50 MG/1
TABLET, EXTENDED RELEASE ORAL
Qty: 90 TABLET | Refills: 0 | Status: SHIPPED | OUTPATIENT
Start: 2024-04-15

## 2024-04-15 NOTE — TELEPHONE ENCOUNTER
Rx Refill Note  Requested Prescriptions     Pending Prescriptions Disp Refills    metoprolol succinate XL (TOPROL-XL) 50 MG 24 hr tablet [Pharmacy Med Name: Metoprolol Succinate ER Oral Tablet Extended Release 24 Hour 50 MG] 90 tablet 0     Sig: TAKE 1 TABLET BY MOUTH EVERY DAY    pravastatin (PRAVACHOL) 40 MG tablet [Pharmacy Med Name: Pravastatin Sodium Oral Tablet 40 MG] 90 tablet 0     Sig: TAKE 1 TABLET BY MOUTH EVERY DAY      Last office visit with prescribing clinician: 9/29/2023     Next office visit with prescribing clinician: 5/8/2024     Lizeth Dooley  04/15/24, 13:56 EDT

## 2024-04-16 ENCOUNTER — TREATMENT (OUTPATIENT)
Dept: PHYSICAL THERAPY | Facility: CLINIC | Age: 79
End: 2024-04-16
Payer: MEDICARE

## 2024-04-16 DIAGNOSIS — R26.89 POOR BALANCE: Primary | ICD-10-CM

## 2024-04-16 PROCEDURE — 97112 NEUROMUSCULAR REEDUCATION: CPT | Performed by: PHYSICAL THERAPIST

## 2024-04-16 PROCEDURE — 97110 THERAPEUTIC EXERCISES: CPT | Performed by: PHYSICAL THERAPIST

## 2024-04-16 PROCEDURE — 97116 GAIT TRAINING THERAPY: CPT | Performed by: PHYSICAL THERAPIST

## 2024-04-16 NOTE — PROGRESS NOTES
"Physical Therapy Daily Treatment Note  Visit: 18  Date of Initial Visit: Type: THERAPY  Noted: 11/30/2023  Poor balance [R26.89]  Cindy Phillips reports: \"I'm doing ok\"    Subjective Evaluation    Pain  No pain reported           Objective   See Exercise, Manual, and Modality Logs for complete treatment.    Exercise 1  Exercise Name 1: SciFit B UE/LEs  Equipment/Resistance 1: level 6  Time: 3 min fwd and 3 min bwd  Exercise 2  Exercise Name 2: St balance on blue foam with other on 8\" step and hold with EC and B cervical rot/flex/ext; one foot on step with other LE coming from foam and stepping to small box turned on its side; standing balance on blue foam with alternating heel tapping step without UE A; one foot on blue foam and other on step with LE on blue foam performing hip abduction and then repeat with LE on blue foam tapping a cone in front  Equipment/Resistance 2: blue foam; cone; 8\" step; min A  Sets/Reps 2: 12  Exercise 3  Exercise Name 3: St staggered on blue foam with LE behind stepping fwd/bwd; B full turns standing on beam; B sidestepping along beam; standing sideways on beam with alternating tapping cone in front  Equipment/Resistance 3: blue foam beam; min/mod A  Sets/Reps 3: 12  Exercise 4  Exercise Name 4: Ambulation with SC with pt educated on two pt gait pattern  Equipment/Resistance 4: SBA; VCing  Sets/Reps 4: 300'       PT Neuro          Assessment & Plan       Assessment  Assessment details: Pt requires min/mod A with standing dynamic balance on uneven surfaces with LOB up to 50% of the time.  Pt has increased LOB with narrow RUPESH and with EC.  Pt initially demonstrates three point gait pattern with SC and was educated to use two point pattern.  Pt demonstrates increased ethel and stability with ambulation with SC and two pt pattern.  Pt to benefit from one additional therapy visit to meet goals.    Plan  Plan details: Pt to benefit from skilled PT services to improve gait, balance, strength, " and overall functional mobility for one additional visit and then discharge.        Manual Therapy:     0     mins  67945;  Therapeutic Exercise:     10     mins  50083;     Neuromuscular Shemar:     20    mins  73827;    Therapeutic Activity:      0     mins  30653;     Gait Training:       10     mins  91515;     Ultrasound:      0     mins  45119;    Electrical Stimulation:     0     mins  09322 ( );  Dry Needling      0     mins self-pay  Traction      0     mins 68434  Canalith Repositioning    0     mins 27533      Timed Treatment:   40   mins   Total Treatment:     40   mins    Umu Rojo, PT  [place] License #: [id number]    Physical Therapist

## 2024-04-23 ENCOUNTER — TREATMENT (OUTPATIENT)
Dept: PHYSICAL THERAPY | Facility: CLINIC | Age: 79
End: 2024-04-23
Payer: MEDICARE

## 2024-04-23 ENCOUNTER — TRANSCRIBE ORDERS (OUTPATIENT)
Dept: ADMINISTRATIVE | Facility: HOSPITAL | Age: 79
End: 2024-04-23
Payer: MEDICARE

## 2024-04-23 DIAGNOSIS — R26.89 POOR BALANCE: Primary | ICD-10-CM

## 2024-04-23 DIAGNOSIS — Z12.31 VISIT FOR SCREENING MAMMOGRAM: Primary | ICD-10-CM

## 2024-04-23 PROCEDURE — 97110 THERAPEUTIC EXERCISES: CPT | Performed by: PHYSICAL THERAPIST

## 2024-04-23 PROCEDURE — 97112 NEUROMUSCULAR REEDUCATION: CPT | Performed by: PHYSICAL THERAPIST

## 2024-04-23 PROCEDURE — 97116 GAIT TRAINING THERAPY: CPT | Performed by: PHYSICAL THERAPIST

## 2024-04-23 NOTE — PROGRESS NOTES
"PT Discharge Note        Patient: Cindy Phillips   : 1945  Diagnosis/ICD-10 Code:  Poor balance [R26.89]  Referring practitioner: ANAHY Lew  Date of Initial Visit: Type: THERAPY  Noted: 2023  Today's Date: 2024  Patient seen for 19 sessions      Subjective:   Cindy Phillips reports: \"I'm going to be off from therapy for a while but when I go and see my doctor I'm going to ask if she will be able to write me a script to come back when I need it.\"  Subjective Questionnaire: n/a  Clinical Progress: improved  Home Program Compliance: Yes  Treatment has included: therapeutic exercise, neuromuscular re-education, therapeutic activity, and gait training    Subjective   Objective     Exercise 1  Exercise Name 1: NuStep B UE/LEs  Equipment/Resistance 1: level 6  Sets/Reps 1: 60 spm  Time: 8 min  Exercise 2  Exercise Name 2: Discharge completed, see for details  Exercise 3  Exercise Name 3: DLP  Equipment/Resistance 3: total gym  Sets/Reps 3: 2  Time 3: 2 min  Exercise 4  Exercise Name 4: B heel raises  Equipment/Resistance 4: total gym  Sets/Reps 4: 2  Time 4: 20  Exercise 5  Exercise Name 5: Ascend/descend 12 steps with B HR's and recip pattern  Equipment/Resistance 5: 12 steps; mod I         Functional Testing  Functional Tests Options: Foster Balance, Timed Up and Go (TUG)  TU.40 sec  FOSTER/56    PT Neuro         Assessment & Plan       Assessment  Assessment details: Pt met all STGs and LTG's for stair climbing and sit to stand.  Pt to be discharged from skilled PT services and continue with HEP.    Goals  Plan Goals: STG (7 visits)  1. Patient to improve FOSTER balance score to >/= 32 /56 to decrease client's risk of falls. MET  2. Patient to perform TUG within 19 sec without LOB for improved functional mobility. MET  3. Patient to ambulate 10 meters with AD within 23 sec without LOB for improved gait ethel and functional mobility. MET  4. Patient to ascend/descend 12 steps with B " "HRs and recip pattern without LOB for improved functional mobility at home. MET    LTG (14 visits)  1. Patient to improve FOSTER balance score to >/= 40 /56 to decrease client's risk of falls. MET  2. Patient to perform TUG within 18 sec without LOB for improved functional mobility. MET  3. Patient to ambulate 10 meters with AD within 20 sec without LOB for improved gait ethel and functional mobility. MET  4. Patient to perform stair climbing 12 steps with 1HR and recip pattern without LOB for improved functional mobility. NOT MET  5. Pt to perform sit to stand without UE A from at least 20\" height 3/3 trails without LOB for improved functional mobility. NOT MET  6. Patient to be I with HEP. MET    Plan  Treatment plan discussed with: patient  Plan details: Patient will be discharged from PT services and continue with HEP.        Visit Diagnoses:    ICD-10-CM ICD-9-CM   1. Poor balance  R26.89 781.99       Progress toward previous goals: Partially Met        Recommendations: Discharge  Timeframe: n/a  Prognosis to achieve goals: n/a    PT Signature: Umu Rojo, ETHEL  [place] License #: [id number]    Based upon review of the patient's progress and continued therapy plan, it is my medical opinion that Cindy Phillips should continue physical therapy treatment at [facility]  [address] [facility] [address] [phone].    Signature: __________________________________  Renetta Magallanes APRN    Timed:  Manual Therapy:    0     mins  86100;  Therapeutic Exercise:    15     mins  17495;     Neuromuscular Shemar:    15    mins  62224;    Therapeutic Activity:     0     mins  44028;     Gait Training:      15     mins  44282;     Ultrasound:     0     mins  07583;    Electrical Stimulation:    0     mins  76612 ( );    Untimed:  Electrical Stimulation:    0     mins  83701 ( );  Mechanical Traction:    0     mins  24469;     Timed Treatment:   45   mins "   Total Treatment:     45   mins

## 2024-05-08 ENCOUNTER — OFFICE VISIT (OUTPATIENT)
Dept: INTERNAL MEDICINE | Facility: CLINIC | Age: 79
End: 2024-05-08
Payer: MEDICARE

## 2024-05-08 ENCOUNTER — LAB (OUTPATIENT)
Dept: INTERNAL MEDICINE | Facility: CLINIC | Age: 79
End: 2024-05-08
Payer: MEDICARE

## 2024-05-08 VITALS
DIASTOLIC BLOOD PRESSURE: 78 MMHG | TEMPERATURE: 96.8 F | HEIGHT: 64 IN | HEART RATE: 62 BPM | RESPIRATION RATE: 14 BRPM | BODY MASS INDEX: 27.28 KG/M2 | SYSTOLIC BLOOD PRESSURE: 122 MMHG | WEIGHT: 159.8 LBS

## 2024-05-08 DIAGNOSIS — Z00.00 ROUTINE GENERAL MEDICAL EXAMINATION AT A HEALTH CARE FACILITY: ICD-10-CM

## 2024-05-08 DIAGNOSIS — E55.9 VITAMIN D DEFICIENCY: ICD-10-CM

## 2024-05-08 DIAGNOSIS — I10 ESSENTIAL HYPERTENSION: Chronic | ICD-10-CM

## 2024-05-08 DIAGNOSIS — Z00.00 MEDICARE ANNUAL WELLNESS VISIT, SUBSEQUENT: Primary | ICD-10-CM

## 2024-05-08 DIAGNOSIS — E78.00 PURE HYPERCHOLESTEROLEMIA: Chronic | ICD-10-CM

## 2024-05-08 LAB
ALBUMIN SERPL-MCNC: 4.3 G/DL (ref 3.5–5.2)
ALBUMIN/GLOB SERPL: 1.4 G/DL
ALP SERPL-CCNC: 71 U/L (ref 39–117)
ALT SERPL W P-5'-P-CCNC: 10 U/L (ref 1–33)
ANION GAP SERPL CALCULATED.3IONS-SCNC: 9 MMOL/L (ref 5–15)
AST SERPL-CCNC: 21 U/L (ref 1–32)
BILIRUB SERPL-MCNC: 0.3 MG/DL (ref 0–1.2)
BUN SERPL-MCNC: 16 MG/DL (ref 8–23)
BUN/CREAT SERPL: 22.5 (ref 7–25)
CALCIUM SPEC-SCNC: 9.5 MG/DL (ref 8.6–10.5)
CHLORIDE SERPL-SCNC: 105 MMOL/L (ref 98–107)
CHOLEST SERPL-MCNC: 220 MG/DL (ref 0–200)
CO2 SERPL-SCNC: 27 MMOL/L (ref 22–29)
CREAT SERPL-MCNC: 0.71 MG/DL (ref 0.57–1)
DEPRECATED RDW RBC AUTO: 44.7 FL (ref 37–54)
EGFRCR SERPLBLD CKD-EPI 2021: 87.7 ML/MIN/1.73
ERYTHROCYTE [DISTWIDTH] IN BLOOD BY AUTOMATED COUNT: 13.3 % (ref 12.3–15.4)
GLOBULIN UR ELPH-MCNC: 3.1 GM/DL
GLUCOSE SERPL-MCNC: 96 MG/DL (ref 65–99)
HCT VFR BLD AUTO: 41.2 % (ref 34–46.6)
HDLC SERPL-MCNC: 68 MG/DL (ref 40–60)
HGB BLD-MCNC: 13.9 G/DL (ref 12–15.9)
LDLC SERPL CALC-MCNC: 134 MG/DL (ref 0–100)
LDLC/HDLC SERPL: 1.93 {RATIO}
MCH RBC QN AUTO: 31 PG (ref 26.6–33)
MCHC RBC AUTO-ENTMCNC: 33.7 G/DL (ref 31.5–35.7)
MCV RBC AUTO: 91.8 FL (ref 79–97)
PLATELET # BLD AUTO: 344 10*3/MM3 (ref 140–450)
PMV BLD AUTO: 10.4 FL (ref 6–12)
POTASSIUM SERPL-SCNC: 4.1 MMOL/L (ref 3.5–5.2)
PROT SERPL-MCNC: 7.4 G/DL (ref 6–8.5)
RBC # BLD AUTO: 4.49 10*6/MM3 (ref 3.77–5.28)
SODIUM SERPL-SCNC: 141 MMOL/L (ref 136–145)
TRIGL SERPL-MCNC: 104 MG/DL (ref 0–150)
TSH SERPL DL<=0.05 MIU/L-ACNC: 1.74 UIU/ML (ref 0.27–4.2)
VLDLC SERPL-MCNC: 18 MG/DL (ref 5–40)
WBC NRBC COR # BLD AUTO: 5.84 10*3/MM3 (ref 3.4–10.8)

## 2024-05-08 PROCEDURE — 1160F RVW MEDS BY RX/DR IN RCRD: CPT | Performed by: INTERNAL MEDICINE

## 2024-05-08 PROCEDURE — 84443 ASSAY THYROID STIM HORMONE: CPT | Performed by: INTERNAL MEDICINE

## 2024-05-08 PROCEDURE — 99397 PER PM REEVAL EST PAT 65+ YR: CPT | Performed by: INTERNAL MEDICINE

## 2024-05-08 PROCEDURE — 3078F DIAST BP <80 MM HG: CPT | Performed by: INTERNAL MEDICINE

## 2024-05-08 PROCEDURE — 82306 VITAMIN D 25 HYDROXY: CPT | Performed by: INTERNAL MEDICINE

## 2024-05-08 PROCEDURE — G0439 PPPS, SUBSEQ VISIT: HCPCS | Performed by: INTERNAL MEDICINE

## 2024-05-08 PROCEDURE — 80061 LIPID PANEL: CPT | Performed by: INTERNAL MEDICINE

## 2024-05-08 PROCEDURE — 1159F MED LIST DOCD IN RCRD: CPT | Performed by: INTERNAL MEDICINE

## 2024-05-08 PROCEDURE — 3074F SYST BP LT 130 MM HG: CPT | Performed by: INTERNAL MEDICINE

## 2024-05-08 PROCEDURE — 80053 COMPREHEN METABOLIC PANEL: CPT | Performed by: INTERNAL MEDICINE

## 2024-05-08 PROCEDURE — 1126F AMNT PAIN NOTED NONE PRSNT: CPT | Performed by: INTERNAL MEDICINE

## 2024-05-08 PROCEDURE — 1170F FXNL STATUS ASSESSED: CPT | Performed by: INTERNAL MEDICINE

## 2024-05-08 PROCEDURE — 36415 COLL VENOUS BLD VENIPUNCTURE: CPT | Performed by: INTERNAL MEDICINE

## 2024-05-08 PROCEDURE — 85027 COMPLETE CBC AUTOMATED: CPT | Performed by: INTERNAL MEDICINE

## 2024-05-09 LAB — 25(OH)D3 SERPL-MCNC: 44.5 NG/ML (ref 30–100)

## 2024-05-30 ENCOUNTER — HOSPITAL ENCOUNTER (OUTPATIENT)
Dept: MAMMOGRAPHY | Facility: HOSPITAL | Age: 79
Discharge: HOME | End: 2024-05-30
Admitting: INTERNAL MEDICINE
Payer: MEDICARE

## 2024-05-30 DIAGNOSIS — Z12.31 VISIT FOR SCREENING MAMMOGRAM: ICD-10-CM

## 2024-05-30 PROCEDURE — 77067 SCR MAMMO BI INCL CAD: CPT

## 2024-05-30 PROCEDURE — 77063 BREAST TOMOSYNTHESIS BI: CPT

## 2024-06-14 RX ORDER — ALENDRONATE SODIUM 70 MG/1
70 TABLET ORAL
Qty: 12 TABLET | Refills: 3 | Status: SHIPPED | OUTPATIENT
Start: 2024-06-14

## 2024-06-14 NOTE — TELEPHONE ENCOUNTER
Last office visit with prescribing clinician: 5/8/2024   Last telemedicine visit with prescribing clinician: Visit date not found   Next office visit with prescribing clinician: 11/7/2024

## 2024-06-14 NOTE — TELEPHONE ENCOUNTER
Caller: Cindy Phillips    Relationship: Self    Best call back number: [phone]    Requested Prescriptions:   Requested Prescriptions     Pending Prescriptions Disp Refills    alendronate (FOSAMAX) 70 MG tablet [Pharmacy Med Name: Alendronate Sodium Oral Tablet 70 MG] 12 tablet 0     Sig: TAKE 1 TABLET BY MOUTH ONCE EVERY 7 DAYS        Pharmacy where request should be sent: [id number] - [place], [place] - [address] [place] [address] - [phone] [facility] [phone]      Last office visit with prescribing clinician: 5/8/2024   Last telemedicine visit with prescribing clinician: Visit date not found   Next office visit with prescribing clinician: 11/7/2024     Additional details provided by patient:     Does the patient have less than a 3 day supply:  [x] Yes  [] No    Would you like a call back once the refill request has been completed: [] Yes [x] No    If the office needs to give you a call back, can they leave a voicemail: [] Yes [x] No    Geo Bowman Rep   06/14/24 15:28 EDT

## 2024-07-25 RX ORDER — PRAVASTATIN SODIUM 40 MG
40 TABLET ORAL DAILY
Qty: 90 TABLET | Refills: 1 | Status: SHIPPED | OUTPATIENT
Start: 2024-07-25

## 2024-07-25 NOTE — TELEPHONE ENCOUNTER
Rx Refill Note  Requested Prescriptions     Pending Prescriptions Disp Refills    pravastatin (PRAVACHOL) 40 MG tablet 90 tablet 1     Sig: Take 1 tablet by mouth Daily.      Last office visit with prescribing clinician: 5/8/2024     Next office visit with prescribing clinician: 11/7/2024       Lizeth Dooley  07/25/24, 16:43 EDT

## 2024-07-25 NOTE — TELEPHONE ENCOUNTER
Caller: Alan Cindy G    Relationship: Self    Best call back number: [phone]     Requested Prescriptions:   Requested Prescriptions     Pending Prescriptions Disp Refills    pravastatin (PRAVACHOL) 40 MG tablet 90 tablet 0     Sig: Take 1 tablet by mouth Daily.        Pharmacy where request should be sent: [id number] - [place], [place] - [address] [facility] [address] - [phone]  - [phone]     Last office visit with prescribing clinician: 5/8/2024   Last telemedicine visit with prescribing clinician: Visit date not found   Next office visit with prescribing clinician: 11/7/2024     Additional details provided by patient: PATIENT IS OUT    Does the patient have less than a 3 day supply:  [x] Yes  [] No    Would you like a call back once the refill request has been completed: [] Yes [x] No    If the office needs to give you a call back, can they leave a voicemail: [] Yes [x] No    Geo Lambert   07/25/24 13:00 EDT

## 2024-08-19 ENCOUNTER — OFFICE VISIT (OUTPATIENT)
Dept: NEUROLOGY | Facility: CLINIC | Age: 79
End: 2024-08-19
Payer: MEDICARE

## 2024-08-19 ENCOUNTER — TELEPHONE (OUTPATIENT)
Dept: NEUROLOGY | Facility: CLINIC | Age: 79
End: 2024-08-19

## 2024-08-19 VITALS
HEART RATE: 58 BPM | DIASTOLIC BLOOD PRESSURE: 80 MMHG | SYSTOLIC BLOOD PRESSURE: 134 MMHG | OXYGEN SATURATION: 93 % | BODY MASS INDEX: 27.29 KG/M2 | HEIGHT: 64 IN | WEIGHT: 159.83 LBS

## 2024-08-19 DIAGNOSIS — R26.81 UNSTEADY GAIT: Primary | ICD-10-CM

## 2024-08-19 DIAGNOSIS — R26.89 POOR BALANCE: ICD-10-CM

## 2024-08-19 DIAGNOSIS — R29.6 FREQUENT FALLS: ICD-10-CM

## 2024-08-19 PROCEDURE — 3075F SYST BP GE 130 - 139MM HG: CPT | Performed by: PSYCHIATRY & NEUROLOGY

## 2024-08-19 PROCEDURE — 3079F DIAST BP 80-89 MM HG: CPT | Performed by: PSYCHIATRY & NEUROLOGY

## 2024-08-19 PROCEDURE — 99214 OFFICE O/P EST MOD 30 MIN: CPT | Performed by: PSYCHIATRY & NEUROLOGY

## 2024-08-19 NOTE — TELEPHONE ENCOUNTER
LEFT VOICE MAIL REQUESTING PATIENT COME IN 15 MIN LATER AT 1:45 PM TODAY. DOUBLE BOOKED AT THE MOMENT.
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
## 2024-08-23 ENCOUNTER — OFFICE VISIT (OUTPATIENT)
Dept: ORTHOPEDIC SURGERY | Facility: CLINIC | Age: 79
End: 2024-08-23
Payer: MEDICARE

## 2024-08-23 VITALS
HEIGHT: 64 IN | BODY MASS INDEX: 27.31 KG/M2 | SYSTOLIC BLOOD PRESSURE: 112 MMHG | WEIGHT: 160 LBS | DIASTOLIC BLOOD PRESSURE: 60 MMHG

## 2024-08-23 DIAGNOSIS — M79.671 RIGHT FOOT PAIN: Primary | ICD-10-CM

## 2024-08-23 PROCEDURE — 1159F MED LIST DOCD IN RCRD: CPT | Performed by: ORTHOPAEDIC SURGERY

## 2024-08-23 PROCEDURE — 1160F RVW MEDS BY RX/DR IN RCRD: CPT | Performed by: ORTHOPAEDIC SURGERY

## 2024-08-23 PROCEDURE — 3078F DIAST BP <80 MM HG: CPT | Performed by: ORTHOPAEDIC SURGERY

## 2024-08-23 PROCEDURE — 99213 OFFICE O/P EST LOW 20 MIN: CPT | Performed by: ORTHOPAEDIC SURGERY

## 2024-08-23 PROCEDURE — 3074F SYST BP LT 130 MM HG: CPT | Performed by: ORTHOPAEDIC SURGERY
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
## 2024-08-25 DIAGNOSIS — I10 ESSENTIAL HYPERTENSION: Primary | Chronic | ICD-10-CM

## 2024-08-26 RX ORDER — METOPROLOL SUCCINATE 50 MG/1
TABLET, EXTENDED RELEASE ORAL
Qty: 90 TABLET | Refills: 0 | Status: SHIPPED | OUTPATIENT
Start: 2024-08-26

## 2024-08-26 NOTE — TELEPHONE ENCOUNTER
Rx Refill Note  Requested Prescriptions     Pending Prescriptions Disp Refills    metoprolol succinate XL (TOPROL-XL) 50 MG 24 hr tablet [Pharmacy Med Name: Metoprolol Succinate ER Oral Tablet Extended Release 24 Hour 50 MG] 90 tablet 0     Sig: TAKE 1 TABLET BY MOUTH EVERY DAY      Last office visit with prescribing clinician: 5/8/2024     Next office visit with prescribing clinician: 8/25/2024       Lizeth Dooley  08/26/24, 08:09 EDT

## 2024-11-07 ENCOUNTER — OFFICE VISIT (OUTPATIENT)
Dept: INTERNAL MEDICINE | Facility: CLINIC | Age: 79
End: 2024-11-07
Payer: MEDICARE

## 2024-11-07 ENCOUNTER — LAB (OUTPATIENT)
Dept: INTERNAL MEDICINE | Facility: CLINIC | Age: 79
End: 2024-11-07
Payer: MEDICARE

## 2024-11-07 VITALS
DIASTOLIC BLOOD PRESSURE: 58 MMHG | SYSTOLIC BLOOD PRESSURE: 110 MMHG | BODY MASS INDEX: 27.35 KG/M2 | OXYGEN SATURATION: 97 % | HEIGHT: 64 IN | TEMPERATURE: 97.3 F | RESPIRATION RATE: 18 BRPM | HEART RATE: 52 BPM | WEIGHT: 160.2 LBS

## 2024-11-07 DIAGNOSIS — E78.00 PURE HYPERCHOLESTEROLEMIA: Chronic | ICD-10-CM

## 2024-11-07 DIAGNOSIS — R29.6 FREQUENT FALLS: ICD-10-CM

## 2024-11-07 DIAGNOSIS — I10 ESSENTIAL HYPERTENSION: Primary | Chronic | ICD-10-CM

## 2024-11-07 DIAGNOSIS — I10 ESSENTIAL HYPERTENSION: Chronic | ICD-10-CM

## 2024-11-07 DIAGNOSIS — R26.89 POOR BALANCE: Chronic | ICD-10-CM

## 2024-11-07 LAB
ALBUMIN SERPL-MCNC: 4.3 G/DL (ref 3.5–5.2)
ALBUMIN/GLOB SERPL: 1.4 G/DL
ALP SERPL-CCNC: 59 U/L (ref 39–117)
ALT SERPL W P-5'-P-CCNC: 11 U/L (ref 1–33)
ANION GAP SERPL CALCULATED.3IONS-SCNC: 12.1 MMOL/L (ref 5–15)
AST SERPL-CCNC: 23 U/L (ref 1–32)
BILIRUB SERPL-MCNC: 0.7 MG/DL (ref 0–1.2)
BUN SERPL-MCNC: 16 MG/DL (ref 8–23)
BUN/CREAT SERPL: 22.2 (ref 7–25)
CALCIUM SPEC-SCNC: 9.6 MG/DL (ref 8.6–10.5)
CHLORIDE SERPL-SCNC: 103 MMOL/L (ref 98–107)
CHOLEST SERPL-MCNC: 226 MG/DL (ref 0–200)
CO2 SERPL-SCNC: 24.9 MMOL/L (ref 22–29)
CREAT SERPL-MCNC: 0.72 MG/DL (ref 0.57–1)
EGFRCR SERPLBLD CKD-EPI 2021: 86.2 ML/MIN/1.73
GLOBULIN UR ELPH-MCNC: 3.1 GM/DL
GLUCOSE SERPL-MCNC: 110 MG/DL (ref 65–99)
HDLC SERPL-MCNC: 75 MG/DL (ref 40–60)
LDLC SERPL CALC-MCNC: 137 MG/DL (ref 0–100)
LDLC/HDLC SERPL: 1.8 {RATIO}
POTASSIUM SERPL-SCNC: 4.3 MMOL/L (ref 3.5–5.2)
PROT SERPL-MCNC: 7.4 G/DL (ref 6–8.5)
SODIUM SERPL-SCNC: 140 MMOL/L (ref 136–145)
TRIGL SERPL-MCNC: 79 MG/DL (ref 0–150)
VLDLC SERPL-MCNC: 14 MG/DL (ref 5–40)

## 2024-11-07 PROCEDURE — 1159F MED LIST DOCD IN RCRD: CPT | Performed by: INTERNAL MEDICINE

## 2024-11-07 PROCEDURE — 80053 COMPREHEN METABOLIC PANEL: CPT | Performed by: INTERNAL MEDICINE

## 2024-11-07 PROCEDURE — 36415 COLL VENOUS BLD VENIPUNCTURE: CPT | Performed by: INTERNAL MEDICINE

## 2024-11-07 PROCEDURE — 3074F SYST BP LT 130 MM HG: CPT | Performed by: INTERNAL MEDICINE

## 2024-11-07 PROCEDURE — 3078F DIAST BP <80 MM HG: CPT | Performed by: INTERNAL MEDICINE

## 2024-11-07 PROCEDURE — 99214 OFFICE O/P EST MOD 30 MIN: CPT | Performed by: INTERNAL MEDICINE

## 2024-11-07 PROCEDURE — 1126F AMNT PAIN NOTED NONE PRSNT: CPT | Performed by: INTERNAL MEDICINE

## 2024-11-07 PROCEDURE — 90662 IIV NO PRSV INCREASED AG IM: CPT | Performed by: INTERNAL MEDICINE

## 2024-11-07 PROCEDURE — 80061 LIPID PANEL: CPT | Performed by: INTERNAL MEDICINE

## 2024-11-07 PROCEDURE — G0008 ADMIN INFLUENZA VIRUS VAC: HCPCS | Performed by: INTERNAL MEDICINE

## 2024-11-07 PROCEDURE — 1160F RVW MEDS BY RX/DR IN RCRD: CPT | Performed by: INTERNAL MEDICINE

## 2024-11-07 RX ORDER — METOPROLOL SUCCINATE 50 MG/1
50 TABLET, EXTENDED RELEASE ORAL DAILY
Qty: 90 TABLET | Refills: 1 | Status: SHIPPED | OUTPATIENT
Start: 2024-11-07

## 2024-11-07 NOTE — PROGRESS NOTES
Chief Complaint  Hypertension, Hyperlipidemia, referral (Patient would like to go back to the same Physical therapist Umu Rojo with [facility] in [facility]), and Fall (Patient states she fell last week, right arm has a couple places and right leg is bruised )    Subjective          Cindy Phillips presents to [facility]    History of Present Illness  The patient is here for follow-up on hypertension, hyperlipidemia, and frequent falls.    Frequent falls, family history of  familial spastic paraparesis - she experienced a fall last week. The incident occurred when she attempted to stand up and reach for her cart, which had been moved by her  and was no longer locked, so it moved when she grabbed it and as a result, she fell, hitting her right arm on the kitchen floor and her body on the carpet. She sustained bruises on her right arm and right leg but does not think she broke anything.  Her  helped her up.   She is interested in resuming physical therapy to improve her balance and strength.    Hypertension-  She occasionally checks her blood pressure at home and maintains a healthy diet, rich in fruits and vegetables, while limiting her intake of red meat and fried foods. She also monitors her sugar intake.     Osteopenia-she continues to take alendronate regularly.    Hyperlipidemia-she is fasting today.  She is on pravastatin         Current Outpatient Medications:     alendronate (FOSAMAX) 70 MG tablet, TAKE 1 TABLET BY MOUTH ONCE EVERY 7 DAYS, Disp: 12 tablet, Rfl: 3    Calcium Carbonate-Vitamin D (CALCIUM-D PO), Take  by mouth., Disp: , Rfl:     fluticasone (FLONASE) 50 MCG/ACT nasal spray, INSTILL 2 SPRAYS IN EACH NOSTRIL DAILY AS DIRECTED, Disp: 16 g, Rfl: 11    MAGNESIUM PO, Take  by mouth., Disp: , Rfl:     methocarbamol (ROBAXIN) 500 MG tablet, TAKE 1 TABLET BY MOUTH 3 TIMES A DAY AS NEEDED FOR BACK PAIN, Disp: 30 tablet, Rfl: 11    metoprolol succinate  "XL (TOPROL-XL) 50 MG 24 hr tablet, TAKE 1 TABLET BY MOUTH EVERY DAY, Disp: 90 tablet, Rfl: 0    naproxen (EC NAPROSYN) 500 MG EC tablet, Take 1 tablet by mouth 2 (Two) Times a Day With Meals. (Patient taking differently: Take 1 tablet by mouth 2 (Two) Times a Day As Needed.), Disp: 60 tablet, Rfl: 0    omeprazole (priLOSEC) 20 MG capsule, Take 1 capsule by mouth Every Morning., Disp: 90 capsule, Rfl: 3    pravastatin (PRAVACHOL) 40 MG tablet, Take 1 tablet by mouth Daily., Disp: 90 tablet, Rfl: 1    VITAMIN D PO, Take  by mouth., Disp: , Rfl:     clobetasol (TEMOVATE) 0.05 % external solution, , Disp: , Rfl:     Cranberry-Vitamin C-Probiotic (AZO CRANBERRY PO), Take  by mouth. (Patient not taking: Reported on 11/7/2024), Disp: , Rfl:     ketoconazole (NIZORAL) 2 % shampoo, , Disp: , Rfl:     Multiple Vitamins-Minerals (HAIR SKIN & NAILS PO), Take  by mouth. (Patient not taking: Reported on 11/7/2024), Disp: , Rfl:     VITAMIN E PO, Take  by mouth. (Patient not taking: Reported on 11/7/2024), Disp: , Rfl:    The following portions of the patient's history were reviewed and updated as appropriate: allergies, current medications, past family history, past medical history, past social history, past surgical history and problem list.     Objective   Vital Signs:   /58 (BP Location: Left arm, Patient Position: Sitting, Cuff Size: Adult)   Pulse 52   Temp 97.3 °F (36.3 °C) (Infrared)   Resp 18   Ht 161.3 cm (63.5\")   Wt 72.7 kg (160 lb 3.2 oz)   SpO2 97%   BMI 27.93 kg/m²       Physical exam  Constitutional: oriented to person, place, and time.  well-developed and well-nourished. No distress.   HENT:   Head: Normocephalic and atraumatic.   Ears: TMs appear normal, no cerumen  Eyes: Conjunctivae and EOM are normal.   Cardiovascular: Normal rate, regular rhythm and normal heart sounds.  Exam reveals no gallop and no friction rub.    No murmur heard.  Pulmonary/Chest: Effort normal and breath sounds normal. No " "respiratory distress.   no wheezes.   Neurological:  alert and oriented to person, place, and time.   Skin: Skin is warm and dry. not diaphoretic.  Does have ecchymosis along right forearm and some mild tenderness but no significant swelling.  Also ecchymosis in right thigh, also no significant swelling  Psychiatric:  normal mood and affect. behavior is normal. Judgment and thought content normal.      Physical Exam     Physical Exam         Results         Result Review :   The following data was reviewed by: Janey Adan MD on 11/07/2024:  CMP          5/8/2024    12:29   CMP   Glucose 96    BUN 16    Creatinine 0.71    EGFR 87.7    Sodium 141    Potassium 4.1    Chloride 105    Calcium 9.5    Total Protein 7.4    Albumin 4.3    Globulin 3.1    Total Bilirubin 0.3    Alkaline Phosphatase 71    AST (SGOT) 21    ALT (SGPT) 10    Albumin/Globulin Ratio 1.4    BUN/Creatinine Ratio 22.5    Anion Gap 9.0      CBC          5/8/2024    12:29   CBC   WBC 5.84    RBC 4.49    Hemoglobin 13.9    Hematocrit 41.2    MCV 91.8    MCH 31.0    MCHC 33.7    RDW 13.3    Platelets 344      Lipid Panel          5/8/2024    12:29   Lipid Panel   Total Cholesterol 220    Triglycerides 104    HDL Cholesterol 68    VLDL Cholesterol 18    LDL Cholesterol  134    LDL/HDL Ratio 1.93      TSH          5/8/2024    12:29   TSH   TSH 1.740      No components found for: \"LFRL98UX\", \"[id number]\", \"URICACID\"            Assessment and Plan          Assessment & Plan  1. Hypertension.  Good control overall.      2. Hyperlipidemia.  On pravastatin, check levels today      3. Frequent Falls.  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  She sees neurology and we will go ahead and refer back to PT    4. Health Maintenance.  She will receive the influenza vaccine today. The next bone density test is due in May 2025. The  colonoscopy is due in 2025.    Follow-up  Return in 6 months for a wellness visit.         Follow Up   No follow-ups on file.  Patient was " given instructions and counseling regarding her condition or for health maintenance advice. Please see specific information pulled into the AVS if appropriate.     Patient or patient representative verbalized consent for the use of Ambient Listening during the visit with  Janey Adan MD for chart documentation. 11/7/2024  11:53 EST

## 2025-02-06 ENCOUNTER — HOSPITAL ENCOUNTER (OUTPATIENT)
Dept: PHYSICAL THERAPY | Facility: HOSPITAL | Age: 80
Setting detail: THERAPIES SERIES
Discharge: HOME | End: 2025-02-06
Payer: MEDICARE

## 2025-02-06 DIAGNOSIS — R26.89 POOR BALANCE: Primary | ICD-10-CM

## 2025-02-06 PROCEDURE — 97110 THERAPEUTIC EXERCISES: CPT | Performed by: PHYSICAL THERAPIST

## 2025-02-06 PROCEDURE — 97162 PT EVAL MOD COMPLEX 30 MIN: CPT | Performed by: PHYSICAL THERAPIST

## 2025-02-06 NOTE — THERAPY EVALUATION
Physical Therapy Initial Evaluation and Plan of Care      Patient: Cindy Phillips   : 1945  Diagnosis/ICD-10 Code:      ICD-10-CM ICD-9-CM   1. Poor balance  R26.89 781.99      Referring practitioner: Janey Adan MD  Today's Date: 2025  [facility]  [address] [address]         Subjective Questionnaire: n/a    Eval Complexity: moderate    Exercise 1  Exercise Name 1: NuStep B UE/LEs; level 7  Time 1: 8  min  Additional Comments: 53 spm      Functional Testing  Outcome Measure Options: 10 Meter Walk, Foster Balance, Timed Up and Go (TUG)  TU.73 sec with CGA and rollator  10 M: 14.45 sec with rollator  FOSTER/56    Subjective Evaluation    History of Present Illness  Mechanism of injury: Pt reports for PT services secondary to recent fall.  Pt was able to get up using the couch. Pt reports that her hair has also started falling out this last month and she is going to have a biopsy b/c the dermatologist also found pink spots on her scalp.  Pt will have the biopsy within the next month or two.  Pt reports that she hasn't been exercising as much the past couple of months since it got cold.     Per MD note:  The patient is here for follow-up on hypertension, hyperlipidemia, and frequent falls.     Frequent falls, family history of  familial spastic paraparesis - she experienced a fall last week. The incident occurred when she attempted to stand up and reach for her cart, which had been moved by her  and was no longer locked, so it moved when she grabbed it and as a result, she fell, hitting her right arm on the kitchen floor and her body on the carpet. She sustained bruises on her right arm and right leg but does not think she broke anything.  Her  helped her up.   She is interested in resuming physical therapy to improve her balance and strength.     Hypertension-  She occasionally checks her blood pressure at home and maintains a healthy diet, rich in  fruits and vegetables, while limiting her intake of red meat and fried foods. She also monitors her sugar intake.      Osteopenia-she continues to take alendronate regularly.     Hyperlipidemia-she is fasting today.  She is on pravastatin           Patient Occupation: retired teacher Quality of life: good    Pain  Current pain ratin (catches)  Location:  arm    Social Support  Lives in: one-story house (4 steps to garage with 1 HR; 14 steps to basement with 1HR.)  Lives with: spouse    Hand dominance: right    Treatments  Previous treatment: physical therapy  Patient Goals  Patient goals for therapy: increased strength and improved balance  Patient goal: perform sit to stand without UE A; [facility]           Objective          Static Posture     Knee   Genu valgus.     Ankle/Foot   Ankle/Foot (Left): Calcaneovalgus, hallux valgus, planovalgus and pronated.     Strength/Myotome Testing     Left Hip   Planes of Motion   Flexion: 4-  Extension: 4-  Abduction: 3+  Adduction: 3+    Right Hip   Planes of Motion   Flexion: 4-  Extension: 4-  Abduction: 3+  Adduction: 3+    Left Knee   Flexion: 4-  Extension: 4-    Right Knee   Flexion: 4-  Extension: 4-    Left Ankle/Foot   Dorsiflexion: 4-  Plantar flexion: 4-    Right Ankle/Foot   Dorsiflexion: 4-  Plantar flexion: 4-    Ambulation     Ambulation: Stairs     Additional Stairs Ambulation Details  N/t    Observational Gait   Decreased walking speed, left step length and right step length.   Base of support: normal    Additional Observational Gait Details  Pt uses rollator with flexed posture and leaning into rollator    Functional Assessment     Comments  Pt unable to perform sit to stand without UE A from low or heightened mat        PT Neuro         Assessment & Plan       Assessment  Impairments: abnormal coordination, abnormal gait, activity intolerance, impaired balance, impaired physical strength, lacks appropriate home exercise program and  "safety issue   Functional limitations: carrying objects, lifting, walking, pulling, standing, stooping and reaching overhead   Assessment details: Pt presents with evolving symptoms secondary to recent fall.  Pt unable to perform sit to stand from any height without UE A.  Pt to benefit from skilled PT services to improve overall functional mobility, strength and balance.    Prognosis: good    Goals  Plan Goals: STG (6 visits)  1. Patient to improve FOSTER balance score to >/= 40 /56 to decrease client's risk of falls.  2. Patient to perform TUG within 12 sec with AD, without LOB for improved functional mobility.  3. Patient to ambulate 10 meters with AD within 13 sec without LOB for improved gait ethel and functional mobility.  4. Patient to ascend/descend 12 steps with B HRs and recip pattern without LOB for improved functional mobility at home.      LTG (12 visits)  1. Patient to improve FOSTER balance score to >/= 48/56 to decrease client's risk of falls.  2. Patient to perform TUG within 11 sec with AD, without LOB for improved functional mobility.  3. Patient to ambulate 10 meters with AD within 12 sec without LOB for improved gait ethel and functional mobility.  4. Patient to perform stair climbing 12 steps with 1HR and recip pattern without LOB for improved functional mobility.   5. Pt to perform sit to stand without UE A from at least 20\" height 3/3 trails without LOB for improved functional mobility.  6. Patient to be I with HE      Plan  Therapy options: will be seen for skilled therapy services  Planned modality interventions: electrical stimulation/Russian stimulation  Planned therapy interventions: gait training, functional ROM exercises, home exercise program, neuromuscular re-education, strengthening, stretching, therapeutic activities, abdominal trunk stabilization, balance/weight-bearing training and postural training  Frequency: 1x week  Duration in visits: 12  Treatment plan discussed with: " patient  Plan details: Patient will be seen 1x/wk x 12 visits with treatment to include strengthening, stretching, functional e-stim therapy, neuromuscular re-education, balance, gait and endurance training.       Therapy Charges for Today       Code Description Service Date Service Provider Modifiers Qty    58027503686  PT THER PROC EA 15 MIN 2/6/2025 Umu Rojo, PT GP 1    [id number]  PT EVAL MOD COMPLEXITY 3 2/6/2025 Umu Rojo, PT GP 1              PT SIGNATURE: Umu Rojo, PT   DATE TREATMENT INITIATED: 2/6/2025    Initial Certification  Certification Period: 2/6/20252020nzfv2/6/2025  I certify that the therapy services are furnished while this patient is under my care.  The services outlined above are required by this patient, and will be reviewed every 90 days.     PHYSICIAN: Janey Adan MD  NPI: [id number]            DATE:                               Please sign and return via fax to [phone].. Thank you, [facility].

## 2025-02-10 DIAGNOSIS — I10 ESSENTIAL HYPERTENSION: Chronic | ICD-10-CM

## 2025-02-10 RX ORDER — PRAVASTATIN SODIUM 40 MG
40 TABLET ORAL DAILY
Qty: 90 TABLET | Refills: 0 | Status: SHIPPED | OUTPATIENT
Start: 2025-02-10

## 2025-02-10 RX ORDER — METOPROLOL SUCCINATE 50 MG/1
50 TABLET, EXTENDED RELEASE ORAL DAILY
Qty: 90 TABLET | Refills: 0 | Status: SHIPPED | OUTPATIENT
Start: 2025-02-10

## 2025-02-10 NOTE — TELEPHONE ENCOUNTER
Caller: Cindy Phillips    Relationship: Self    Best call back number: [phone]     Requested Prescriptions:   Requested Prescriptions     Pending Prescriptions Disp Refills    metoprolol succinate XL (TOPROL-XL) 50 MG 24 hr tablet 90 tablet 1     Sig: Take 1 tablet by mouth Daily.    pravastatin (PRAVACHOL) 40 MG tablet 90 tablet 1     Sig: Take 1 tablet by mouth Daily.        Pharmacy where request should be sent: [id number] - [place], [place] - [address] [place] [address] - [phone]  - [phone]     Last office visit with prescribing clinician: 11/7/2024   Last telemedicine visit with prescribing clinician: Visit date not found   Next office visit with prescribing clinician: 5/14/2025     Additional details provided by patient: PATIENT IS OUT OF PRAVASTATIN    Does the patient have less than a 3 day supply:  [x] Yes  [] No    Would you like a call back once the refill request has been completed: [] Yes [] No    If the office needs to give you a call back, can they leave a voicemail: [] Yes [] No    Geo Figueroa Rep   02/10/25 15:26 EST

## 2025-02-10 NOTE — TELEPHONE ENCOUNTER
Rx Refill Note  Requested Prescriptions     Pending Prescriptions Disp Refills    metoprolol succinate XL (TOPROL-XL) 50 MG 24 hr tablet 90 tablet 0     Sig: Take 1 tablet by mouth Daily.    pravastatin (PRAVACHOL) 40 MG tablet 90 tablet 0     Sig: Take 1 tablet by mouth Daily.      Last office visit with prescribing clinician: 11/7/2024   Last telemedicine visit with prescribing clinician: Visit date not found   Next office visit with prescribing clinician: 5/14/2025                         Would you like a call back once the refill request has been completed: [] Yes [] No    If the office needs to give you a call back, can they leave a voicemail: [] Yes [] No    Ashley Zambrano  02/10/25, 15:36 EST

## 2025-02-13 ENCOUNTER — HOSPITAL ENCOUNTER (OUTPATIENT)
Dept: PHYSICAL THERAPY | Facility: HOSPITAL | Age: 80
Setting detail: THERAPIES SERIES
Discharge: HOME | End: 2025-02-13
Payer: MEDICARE

## 2025-02-13 DIAGNOSIS — R26.89 POOR BALANCE: Primary | ICD-10-CM

## 2025-02-13 PROCEDURE — 97116 GAIT TRAINING THERAPY: CPT | Performed by: PHYSICAL THERAPIST

## 2025-02-13 PROCEDURE — 97112 NEUROMUSCULAR REEDUCATION: CPT | Performed by: PHYSICAL THERAPIST

## 2025-02-13 PROCEDURE — 97110 THERAPEUTIC EXERCISES: CPT | Performed by: PHYSICAL THERAPIST

## 2025-02-13 NOTE — THERAPY TREATMENT NOTE
"Physical Therapy Daily Note      Patient: Cindy Phillips   : 1945  MRN: [id number]   Diagnosis/ICD-10 Code:      ICD-10-CM ICD-9-CM   1. Poor balance  R26.89 781.99      Referring practitioner: Janey Adan MD  Today's Date: 2025      Subjective:   Patient reports: \"I'm doing well, nothing new.  I would like to be scheduled through March.\"  Pain: 0/10    Objective   See Exercise, Manual, and Modality Logs for complete treatment.    Exercise 1  Exercise Name 1: NuStep B UE/LEs; level 7  Time 1: 8  min  Additional Comments: spm  Exercise 2  Exercise Name 2: ST balance on blue foam with alternating heel tapping step in front; hold foot on step with EC; st on blue foam with fwd and B sidestepping up onto/off step without UE A  Reps 2: 10  Additional Comments: blue foam; 10\" step; min A  Exercise 3  Exercise Name 3: St balance on blue foam with alternating fwd lunge and side lunge to center of BOSU without UEA; holding fwd lunge to BOSU standing on bue foam with EC  Reps 3: 10  Additional Comments: blue foam; BOSU; min A  Exercise 4  Exercise Name 4: Ambulation fwd/bwd length of // bars with emphasis on relaxed UEs and increased B step length; fwd walking length of // bars with full B turns in the center of // bars; fwd/bwd walking with head turns R/L and looking up/down  Reps 4: length of // bars x 8 of each  Additional Comments: // bars; min A      PT Neuro     Balance Skills Training  [id number] -  PT Neuromuscular Reeducation Minutes: 15    Assessment & Plan       Assessment  Assessment details: Pt requires min A with standing balance on uneven surfaces with stepping to step and rounded side of BOSU.  Pt has LOB up to 30% of time requiring min A.  Pt has difficulty with walking straight with added head turns and presses B hands on lower legs to increase stability in stead of performing arm swing.  Pt to benefit from skilled PT services to improve overall functional mobility.    Plan  Plan details: Pt to " continue with PT services to improve gait, balance, strength, and overall functional mobility.          Therapy Charges for Today       Code Description Service Date Service Provider Modifiers Qty    84319552831 HC PT NEUROMUSC RE EDUCATION EA 15 MIN 2/13/2025 Umu Rojo, PT GP 1    27234431534 HC PT THER PROC EA 15 MIN 2/13/2025 Umu Rojo, PT GP 1    [id number]  GAIT TRAINING EA 15 MIN 2/13/2025 Umu Rojo, PT GP 1            ETHEL Campbell License #: [id number]    Physical Therapist

## 2025-02-20 ENCOUNTER — HOSPITAL ENCOUNTER (OUTPATIENT)
Dept: PHYSICAL THERAPY | Facility: HOSPITAL | Age: 80
Setting detail: THERAPIES SERIES
Discharge: HOME | End: 2025-02-20
Payer: MEDICARE

## 2025-02-20 DIAGNOSIS — R26.89 POOR BALANCE: Primary | ICD-10-CM

## 2025-02-20 PROCEDURE — 97112 NEUROMUSCULAR REEDUCATION: CPT | Performed by: PHYSICAL THERAPIST

## 2025-02-20 PROCEDURE — 97110 THERAPEUTIC EXERCISES: CPT | Performed by: PHYSICAL THERAPIST

## 2025-02-20 NOTE — THERAPY TREATMENT NOTE
"Physical Therapy Daily Note      Patient: Cindy Phillips   : 1945  MRN: [id number]   Diagnosis/ICD-10 Code:      ICD-10-CM ICD-9-CM   1. Poor balance  R26.89 781.99      Referring practitioner: Janey Adan MD  Today's Date: 2025      Subjective:   Patient reports: \"I used to have pain just below my right knee and it's gone.  I'm not sure if it's from this or not.\"  Pt reports no additional falls this week.  Pain: 0/10    Objective   See Exercise, Manual, and Modality Logs for complete treatment.    Exercise 1  Exercise Name 1: NuStep B UE/LEs; level 6  Time 1: 8  min  Exercise 2  Exercise Name 2: St balance with fwd and B roatational lunge to rounded side of BOSU without UE A; st balance on both sides with head lifted  Reps 2: 12  Additional Comments: BOSU; min/mod A  Exercise 3  Exercise Name 3: Fwd/bwd walking along // bars with alternating tapping cone to the right and left  Reps 3: 8 ft x 10  Additional Comments: min/mod A  Exercise 4  Exercise Name 4: Fwd walking along beam with LOB up to 80% of the time; holding staggered on beam with alternating punch holding 2# wt; B sidestepping along beam with band around both LEs above knees  Sets 4: 10  Additional Comments: balance beam; min/mod A      PT Neuro     Balance Skills Training  57448 -  PT Neuromuscular Reeducation Minutes: 30    Assessment & Plan       Assessment  Assessment details: Pt requires min/mod A with standing dynamic balance with increased LOB with more narrow RUPESH and SLS.  Pt educated to perform marching in place at home with going more slowly and trying to perform increased SLS.  Pt continues to demonstrate B hip weakness with SLS activities.  Pt to benefit from skilled PT services to improve overall functional mobility.    Plan  Plan details: Pt to continue with PT services to improve gait, balance, strength, and overall functional mobility.          Therapy Charges for Today       Code Description Service Date Service " Provider Modifiers Qty    43225178896  PT NEUROMUSC RE EDUCATION EA 15 MIN 2/20/2025 Umu Rojo, PT GP 2    [id number]  PT THER PROC EA 15 MIN 2/20/2025 Umu Rojo, PT GP 1            Umu Rojo, PT  [place] License #: [id number]    Physical Therapist

## 2025-02-25 ENCOUNTER — HOSPITAL ENCOUNTER (OUTPATIENT)
Dept: PHYSICAL THERAPY | Facility: HOSPITAL | Age: 80
Setting detail: THERAPIES SERIES
Discharge: HOME | End: 2025-02-25
Payer: MEDICARE

## 2025-02-25 DIAGNOSIS — R26.89 POOR BALANCE: Primary | ICD-10-CM

## 2025-02-25 PROCEDURE — 97112 NEUROMUSCULAR REEDUCATION: CPT | Performed by: PHYSICAL THERAPIST

## 2025-02-25 PROCEDURE — 97110 THERAPEUTIC EXERCISES: CPT | Performed by: PHYSICAL THERAPIST

## 2025-02-25 NOTE — THERAPY TREATMENT NOTE
"Physical Therapy Daily Note      Patient: Cindy Phillips   : 1945  MRN: [id number]   Diagnosis/ICD-10 Code:      ICD-10-CM ICD-9-CM   1. Poor balance  R26.89 781.99      Referring practitioner: Janey Adan MD  Today's Date: 2025      Subjective:   Patient reports: \"I still use my cane and surf furniture.  I will take some steps but I weave.\"  Pain: 0/10    Objective   See Exercise, Manual, and Modality Logs for complete treatment.    Exercise 1  Exercise Name 1: NuStep B UE/LEs; level 6  Time 1: 8  min  Additional Comments: 59 spm  Exercise 2  Exercise Name 2: DLP; two pillows behind back  Sets 2: 2  Time 2: 2 min  Additional Comments: total gym  Exercise 3  Exercise Name 3: Heel raises  Sets 3: 2  Reps 3: 20  Additional Comments: total gym  Exercise 4  Exercise Name 4: Seated stool pull  Time 4: 3 min  Exercise 5  Exercise Name 5: ST balance with one foot beside orange nicki and stepping foot over/back without UE A; fwd stepping over five hurdles without UE A; fwd/bwd zig-zag stepping between hurdles  Reps 5: 4 laps  Additional Comments: five hurdles; min A  Exercise 6  Exercise Name 6: St balance on rocker board R/L and fwd/bwd with head lifed with EO and EC  Sets 6: until fatigued  Additional Comments: min A    PT Neuro     Balance Skills Training  54938 -  PT Neuromuscular Reeducation Minutes: 25    Assessment & Plan       Assessment  Assessment details: Pt requires CGA to get on/off total gym with VCing to keep knees in line with second toe during squat.  Pt has increased LOB with EC on rocker board.  Pt demonstrates decreased ethel, B arms in high guard, decreased step length B and wider RUPESH with ambulation without AD.  Pt to benefit from skilled PT services to improve overall functional mobility.    Plan  Plan details: Pt to continue with PT services to improve gait, balance, strength, and overall functional mobility.          Therapy Charges for Today       Code Description Service Date " Service Provider Modifiers Qty    72015215092 HC PT NEUROMUSC RE EDUCATION EA 15 MIN 2/25/2025 Umu Rojo, PT GP 2    33704085211 HC PT THER PROC EA 15 MIN 2/25/2025 Umu Rojo, PT GP 1            Umu Rojo, PT  [place] License #: [id number]    Physical Therapist

## 2025-03-04 ENCOUNTER — HOSPITAL ENCOUNTER (OUTPATIENT)
Dept: PHYSICAL THERAPY | Facility: HOSPITAL | Age: 80
Setting detail: THERAPIES SERIES
Discharge: HOME | End: 2025-03-04
Payer: MEDICARE

## 2025-03-04 DIAGNOSIS — R26.89 POOR BALANCE: Primary | ICD-10-CM

## 2025-03-04 DIAGNOSIS — R32 INCONTINENCE OF URINE IN FEMALE: ICD-10-CM

## 2025-03-04 PROCEDURE — 97112 NEUROMUSCULAR REEDUCATION: CPT

## 2025-03-04 PROCEDURE — 97110 THERAPEUTIC EXERCISES: CPT

## 2025-03-04 NOTE — THERAPY PROGRESS REPORT/RE-CERT
PT Progress Note  [facility] [place] [facility]  [address]    Patient: Cindy Phillips   : 1945  Diagnosis/ICD-10 Code:      ICD-10-CM ICD-9-CM   1. Poor balance  R26.89 781.99   2. Incontinence of urine in female  R32 788.30      MRN: [id number]    Referring practitioner: Janey Adan MD  Today's Date: 3/4/2025    Subjective:   Patient reports: Patient reports history of bladder issues for past 30 years that impacts her balance and she has had a fall at night from urinary urgency.  Pain: 0  Clinical Progress: unchanged  Home Program Compliance: Yes  Treatment has included: therapeutic exercise, neuromuscular re-education, and therapeutic activity    Bladder function:  Incontinence: mixed incontinence  # of pads in 24 hours: 2   What specifically makes you leak: urgency mostly, but also during gait  Leak at night: most leakage occurs at night or after tea  Urination at night: 2-3x per night  Use bathroom “just in case”: daily  Strong urinary urge: yes  Leaking with urge: yes  Urge triggers: tea  Complete bladder voiding %: 50-60%  Urinary splaying: no  Post-micturition dribble: no  Frequency of urination: 10x per day, 3-4 per night  Discomfort with urination: no  Vaginal or anal itching: no  Fluid irritants consumed daily: 3-5 times per week tea    Bowel function:  How many episodes of leakage/month: na  How many BM's/day: 3-4 per week  [place] Stool Scale Type: 5,6,7  Discomfort with BM: yes during and after  Complete bowel voiding %: no  Assistance to pass stool: yes pushes on belly  Diet: tried fiber pills before but it didn't work  Incontinence at night: no  Incontinence with gas: no  Ability to pass gas: na    Sexual activity  Sexually active: na    Objective   See Exercise, Manual, and Modality Logs for complete treatment.       25 1600   Subjective Pain   Able to rate subjective pain? yes   Pre-Treatment Pain Level 0   Total Minutes   84226 - PT Therapeutic Exercise  Minutes 10   46726 -  PT Neuromuscular Reeducation Minutes 35   Exercise 1   Exercise Name 1 NuStep B UE/LEs; level 6   Time 1 10 mins   Additional Comments 38 SPM   Exercise 2   Exercise Name 2 colon massage-see scanned in chart   Time 2 15 minutes including education   Exercise 3   Exercise Name 3 Ed on pelvic floor/core/diaphragm system, ed on function of PF muscles, ed on squatty potty/foot support for complete bowel emptying, ed on bladder iritants and diluting tea with water or cutting back ammount of tea, ed on double voiding for urgency/frequency, ed on future options of internal PF muscle exam to assess strength/function, ed on diet and adequate fiber intake   Time 3 20     Balance Skills Training  86212 -  PT Neuromuscular Reeducation Minutes: 35    Assessment & Plan       Assessment  Impairments: abnormal coordination, abnormal gait, activity intolerance, impaired balance, impaired physical strength, lacks appropriate home exercise program, pain with function and safety issue   Functional limitations: carrying objects, lifting, walking, pulling, standing, stooping and reaching overhead   Assessment details: Pt reports history of bladder leakage x 30 years and more recntly incomplete bowel empyting with pain and straining; this hinders her functional mobility and safety. Pt assessed for pelvic floor dysfunction this date as it is a hazard to her balance; she has had a night fall when getting up with urinary urgency. Goals and POC addressed accordingly. Pt to benefit from skilled PT services to improve overall functional mobility, strength and balance to meet goals.  Prognosis: good    Goals  Plan Goals: STG (6 visits)  1. Patient to improve FOSTER balance score to >/= 40 /56 to decrease client's risk of falls. ONGOING  2. Patient to perform TUG within 12 sec with AD, without LOB for improved functional mobility. ONGOING  3. Patient to ambulate 10 meters with AD within 13 sec without LOB for improved gait  "ethel and functional mobility. ONGOING  4. Patient to ascend/descend 12 steps with B HRs and recip pattern without LOB for improved functional mobility at home. ONGOING  5. Patient to report 50% decreased leakage daily, for improved QOL. NEW  6. Patient to report 50% decreased urinary urgency for improved QOL and decreased fall risk. NEW     LTG (12 visits)  1. Patient to improve FOSTER balance score to >/= 48/56 to decrease client's risk of falls. ONGOING  2. Patient to perform TUG within 11 sec with AD, without LOB for improved functional mobility. ONGOING  3. Patient to ambulate 10 meters with AD within 12 sec without LOB for improved gait ethel and functional mobility. ONGOING  4. Patient to perform stair climbing 12 steps with 1HR and recip pattern without LOB for improved functional mobility. ONGOING  5. Pt to perform sit to stand without UE A from at least 20\" height 3/3 trails without LOB for improved functional mobility.ONGOING  5. Patient to be I with HE. ONGOING  6. Patient to report 50% decreased leakage daily, for improved QOL. NEW  7. Patient to report 50% decreased urinary urgency for improved QOL and decreased fall risk. NEW  8. Patient to report 4-5 BM per week with bristol stool chart average of 4-5, for improved PF function. NEW      Plan  Therapy options: will be seen for skilled therapy services  Planned modality interventions: electrical stimulation/Russian stimulation  Planned therapy interventions: gait training, functional ROM exercises, home exercise program, neuromuscular re-education, strengthening, stretching, therapeutic activities, abdominal trunk stabilization, balance/weight-bearing training, postural training, manual therapy, body mechanics training, flexibility and soft tissue mobilization  Frequency: 1x week  Duration in visits: 7  Treatment plan discussed with: patient  Plan details: Patient will be seen 1x/wk x 7 visits with treatment to include strengthening, stretching, " functional e-stim therapy, neuromuscular re-education, balance, gait and endurance training.         Progress toward previous goals:  GOOD      Recommendations: Continue as planned  Timeframe:  7 VISITS  Therapy Charges for Today       Code Description Service Date Service Provider Modifiers Qty    99446241963 HC PT NEUROMUSC RE EDUCATION EA 15 MIN 3/4/2025 Cesia Granados, PT GP 2    51619076811 HC PT THER PROC EA 15 MIN 3/4/2025 Cesia Granados PT GP 1          PT Signature: Cesia Granados PT, DPT  [place] License #: [id number]    Based upon review of the patient's progress and continued therapy plan, it is my medical opinion that Cindy Pihllips should continue physical therapy treatment at [facility] PT at [facility].    Signature: __________________________________  Janey Adan MD

## 2025-03-10 ENCOUNTER — TELEPHONE (OUTPATIENT)
Dept: GASTROENTEROLOGY | Facility: CLINIC | Age: 80
End: 2025-03-10
Payer: MEDICARE

## 2025-03-10 NOTE — TELEPHONE ENCOUNTER
LIBBY, PLEASE GIVE PATIENT A CALL BACK. PATIENT LEFT A MESSAGE CONCERNING A MEDICATION WHICH WASN'T NAMED IN THE VOICE MAIL.

## 2025-03-11 ENCOUNTER — HOSPITAL ENCOUNTER (OUTPATIENT)
Dept: PHYSICAL THERAPY | Facility: HOSPITAL | Age: 80
Setting detail: THERAPIES SERIES
Discharge: HOME | End: 2025-03-11
Payer: MEDICARE

## 2025-03-11 DIAGNOSIS — R32 INCONTINENCE OF URINE IN FEMALE: ICD-10-CM

## 2025-03-11 DIAGNOSIS — R26.89 POOR BALANCE: Primary | ICD-10-CM

## 2025-03-11 PROCEDURE — 97112 NEUROMUSCULAR REEDUCATION: CPT

## 2025-03-11 PROCEDURE — 97110 THERAPEUTIC EXERCISES: CPT

## 2025-03-11 NOTE — THERAPY TREATMENT NOTE
Physical Therapy Daily Note      Patient: Cindy Phillips   : 1945  MRN: [id number]   Diagnosis/ICD-10 Code:      ICD-10-CM ICD-9-CM   1. Poor balance  R26.89 781.99   2. Incontinence of urine in female  R32 788.30      Referring practitioner: Janey Adan MD  Today's Date: 3/11/2025    Subjective:   Patient reports: Patient reports she is urinating 1-2x per night this week. She had 3 days over the past 7 with solid BM; other wise bristol stool #6-7. However ea bowel movement has been complete emptying. No episodes of bowel leakage and 1 episode of urinary leakage over past week. Wants to go over colon massage again. Starting metamucil soon, instructed to drink extra glass of water ea day for hydration. She had less urgency when removing tea this past week.  Pain: 0  Treatment has included: therapeutic exercise and neuromuscular re-education    Objective   See Exercise, Manual, and Modality Logs for complete treatment.     25 1600   Total Minutes   56599 - PT Therapeutic Exercise Minutes 35   86734 -  PT Neuromuscular Reeducation Minutes 10   Exercise 2   Exercise Name 2 colon massage-see scanned in chart, new handout. Pt returned demo as well as verbalized knowledge at end of session.   Time 2 20 minutes including time for subjective   Exercise 3   Exercise Name 3 SL piriformis stretching, LTR, hooklying breathing emphasis on back rib cage expansion   Cueing 3 Verbal;Tactile;Demo   Additional Comments see scanned in chart   Exercise 4   Exercise Name 4 trials at pelvic clocks and pelvic tilts in seated and then in supine, caused increased pain in stomach     PT Pelvic     Balance Skills Training  44379 -  PT Neuromuscular Reeducation Minutes: 10    Assessment & Plan       Assessment  Assessment details: Patient made marked improvements in decreased night time urination and 3 days of complete bowel movements. Pelvic stiffness is reinforced by tight glutes, stretching HEP initiated. Pt had success  practicing 360 ribcage breathing in hook lying, feeling movement in pelvic floor. Patient would continue to benefit from skilled PT services to achieve highest level of functioning.      Plan  Plan details: Patient to continue with PT services to improve gait, balance, strength, transfers and overall functional mobility.           Therapy Charges for Today       Code Description Service Date Service Provider Modifiers Qty    73901915360 HC PT NEUROMUSC RE EDUCATION EA 15 MIN 3/11/2025 Cesia Granados, PT GP 1    [id number] HC PT THER PROC EA 15 MIN 3/11/2025 Cesia Granados, PT GP 2          PT SIGNATURE: Cesia Granados PT, DPT  [place] License # [id number]  Physical Therapist

## 2025-03-17 ENCOUNTER — OFFICE VISIT (OUTPATIENT)
Dept: NEUROLOGY | Facility: CLINIC | Age: 80
End: 2025-03-17
Payer: MEDICARE

## 2025-03-17 VITALS — DIASTOLIC BLOOD PRESSURE: 70 MMHG | HEART RATE: 59 BPM | SYSTOLIC BLOOD PRESSURE: 128 MMHG | OXYGEN SATURATION: 95 %

## 2025-03-17 DIAGNOSIS — R29.6 FREQUENT FALLS: ICD-10-CM

## 2025-03-17 DIAGNOSIS — R26.89 POOR BALANCE: ICD-10-CM

## 2025-03-17 DIAGNOSIS — R26.81 UNSTEADY GAIT: Primary | ICD-10-CM

## 2025-03-17 PROCEDURE — 3078F DIAST BP <80 MM HG: CPT | Performed by: PSYCHIATRY & NEUROLOGY

## 2025-03-17 PROCEDURE — 1159F MED LIST DOCD IN RCRD: CPT | Performed by: PSYCHIATRY & NEUROLOGY

## 2025-03-17 PROCEDURE — 99214 OFFICE O/P EST MOD 30 MIN: CPT | Performed by: PSYCHIATRY & NEUROLOGY

## 2025-03-17 PROCEDURE — 3074F SYST BP LT 130 MM HG: CPT | Performed by: PSYCHIATRY & NEUROLOGY

## 2025-03-17 PROCEDURE — 1160F RVW MEDS BY RX/DR IN RCRD: CPT | Performed by: PSYCHIATRY & NEUROLOGY
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
## 2025-03-18 ENCOUNTER — HOSPITAL ENCOUNTER (OUTPATIENT)
Dept: PHYSICAL THERAPY | Facility: HOSPITAL | Age: 80
Setting detail: THERAPIES SERIES
Discharge: HOME | End: 2025-03-18
Payer: MEDICARE

## 2025-03-18 DIAGNOSIS — R32 INCONTINENCE OF URINE IN FEMALE: ICD-10-CM

## 2025-03-18 DIAGNOSIS — R26.89 POOR BALANCE: Primary | ICD-10-CM

## 2025-03-18 PROCEDURE — 97530 THERAPEUTIC ACTIVITIES: CPT

## 2025-03-18 PROCEDURE — 97112 NEUROMUSCULAR REEDUCATION: CPT

## 2025-03-18 NOTE — THERAPY TREATMENT NOTE
Physical Therapy Daily Note      Patient: Cindy Phillips   : 1945  MRN: [id number]   Diagnosis/ICD-10 Code:      ICD-10-CM ICD-9-CM   1. Poor balance  R26.89 781.99   2. Incontinence of urine in female  R32 788.30      Referring practitioner: Janey Adan MD  Today's Date: 3/18/2025    Subjective:   Patient reports: Patient had 4 days last week of solid Bms but continues to have increased frequency of BMS after solid episode-ex 1 solid BM then 3-4 more trips to bathroom that night. She is fine with leaving tea out of diet but reports she will try to add more H20. Difficulty with mixing metamucil up to drinkable consistency. Agreeable to internal exam next week.  Pain: 0  Treatment has included: neuromuscular re-education and therapeutic activity    Objective   See Exercise, Manual, and Modality Logs for complete treatment.     25 1500   Total Minutes   69858 -  PT Neuromuscular Reeducation Minutes 23   36817 - PT Therapeutic Activity Minutes 20   Exercise 1   Exercise Name 1 Ed: increase H20 by 1-2 glasses per day, warm water before adding metamucil for better consistency leading to increased compliance, ed on internal pelvic muscle exam next session, ed on POC progression. Includes time for subjective   Time 1 20 mins   Exercise 2   Exercise Name 2 progressed TA contraction to LE marches, see in chart   Time 2 20 mins     PT Neuro     Transfers  93894 - PT Therapeutic Activity Minutes: 20  Balance Skills Training  58123 -  PT Neuromuscular Reeducation Minutes: 20    Assessment & Plan       Assessment  Assessment details: Session focused on progressing TA activation and bowel education. Pt agreeable to internal exam next session to assess pelvic floor involvement. Patient would continue to benefit from skilled PT services to achieve highest level of functioning.      Plan  Plan details: Patient to continue with PT services to improve PF coordination, PF strength, core strength, and improve  bowel/bladder function.            Therapy Charges for Today       Code Description Service Date Service Provider Modifiers Qty    85636709651 HC PT NEUROMUSC RE EDUCATION EA 15 MIN 3/18/2025 Cesia Granados, PT GP 2    [id number]  PT THERAPEUTIC ACT EA 15 MIN 3/18/2025 Cesia Granados, PT GP 1            PT SIGNATURE: Cesia Granados PT, DPT  [place] License # [id number]  Physical Therapist

## 2025-03-25 ENCOUNTER — HOSPITAL ENCOUNTER (OUTPATIENT)
Dept: PHYSICAL THERAPY | Facility: HOSPITAL | Age: 80
Setting detail: THERAPIES SERIES
Discharge: HOME | End: 2025-03-25
Payer: MEDICARE

## 2025-03-25 DIAGNOSIS — R32 INCONTINENCE OF URINE IN FEMALE: Primary | ICD-10-CM

## 2025-03-25 PROCEDURE — 97140 MANUAL THERAPY 1/> REGIONS: CPT

## 2025-03-25 PROCEDURE — 97112 NEUROMUSCULAR REEDUCATION: CPT

## 2025-03-25 NOTE — THERAPY TREATMENT NOTE
"  Physical Therapy Daily Note      Patient: Cindy Phillips   : 1945  MRN: [id number]   Diagnosis/ICD-10 Code:      ICD-10-CM ICD-9-CM   1. Incontinence of urine in female  R32 788.30      Referring practitioner: Janey Adan MD  Today's Date: 3/25/2025    Subjective:   Patient reports: Patient is unsure about her abdominal exercises. She has had 4 solid Bms over past week and 1 episode of urine leakage, no fecal leakage. Urinating 0-1x per night over past week  Pain: 0  Treatment has included: neuromuscular re-education and manual therapy      Subjective      Objective   See Exercise, Manual, and Modality Logs for complete treatment.     25 1500   Total Minutes   18953 - PT Manual Therapy Minutes 15   Manual Rx 1   Manual Rx 1 Location PF internal muscle/strength assessment: Minimally TTTP at L obturator internus, otherwise unremarkable for pain/reproduction of symptoms. Strength 3+/5, 2 quick flicks, held 3 seconds at minimal contraction.    Pressure management training w/PF contraction during lower abdominal bracing   Grade 1 prolapse anterior vaginal wall w/coughing and head lifting.  Downward pressure with head movements and lower abdominal exercises, however Pt able to correct with tactile and verbal cueing to contract PF \"up toward head\" during pressure management training.       25 1600   Total Minutes   12470 -  PT Neuromuscular Reeducation Minutes 25   Exercise 1   Exercise Name 1 HEP updated: see scanned in chart   Cueing 1 Verbal;Tactile;Demo   Exercise 2   Exercise Name 2 Supine marching w/TA contraction and PF contraction   Cueing 2 Verbal;Tactile;Demo   Exercise 3   Exercise Name 3 Seated towel roll PF contractions   Cueing 3 Verbal;Tactile;Demo       PT Pelvic     Balance Skills Training  28802 -  PT Neuromuscular Reeducation Minutes: 25    Assessment & Plan       Assessment  Assessment details: Pt had no tenderness/pain w/internal pelvic floor palpation however had poor " muscle endurance and downward pressure with abdominal exercises or head lifting. She was able to correct downward movement of PF with tactile and verbal cueing. HEP updated accordingly. Patient would continue to benefit from skilled PT services to achieve highest level of functioning.      Plan  Plan details: Patient to continue with PT services to improve PF coordination, PF strength, core strength, decrease pain and improve bowel/bladder function.            Therapy Charges for Today       Code Description Service Date Service Provider Modifiers Qty    21620753342 HC PT NEUROMUSC RE EDUCATION EA 15 MIN 3/25/2025 Cesia Granados, PT GP 2    85068427760 HC PT MANUAL THERAPY EA 15 MIN 3/25/2025 Cesia Granados, PT GP 1          PT SIGNATURE: Cesia Granados PT, DPT  [place] License # [id number]  Physical Therapist

## 2025-04-01 ENCOUNTER — HOSPITAL ENCOUNTER (OUTPATIENT)
Dept: PHYSICAL THERAPY | Facility: HOSPITAL | Age: 80
Setting detail: THERAPIES SERIES
Discharge: HOME | End: 2025-04-01
Payer: MEDICARE

## 2025-04-01 DIAGNOSIS — R26.89 POOR BALANCE: ICD-10-CM

## 2025-04-01 DIAGNOSIS — R32 INCONTINENCE OF URINE IN FEMALE: Primary | ICD-10-CM

## 2025-04-01 PROCEDURE — 97112 NEUROMUSCULAR REEDUCATION: CPT

## 2025-04-01 PROCEDURE — 97110 THERAPEUTIC EXERCISES: CPT

## 2025-04-01 RX ORDER — OMEPRAZOLE 20 MG/1
20 CAPSULE, DELAYED RELEASE ORAL EVERY MORNING
Qty: 90 CAPSULE | Refills: 1 | Status: SHIPPED | OUTPATIENT
Start: 2025-04-01

## 2025-04-01 NOTE — THERAPY TREATMENT NOTE
Physical Therapy Daily Note      Patient: Cindy Phillips   : 1945  MRN: [id number]   Diagnosis/ICD-10 Code:      ICD-10-CM ICD-9-CM   1. Incontinence of urine in female  R32 788.30   2. Poor balance  R26.89 781.99      Referring practitioner: Janey Adan MD  Today's Date: 2025    Subjective:   Patient reports: Patient reported days she took metamucil she had 4 solid Bms in a row. 2 episodes urine leakage, both occurring when going from sit>stand to go to bathroom.  Pain: 0  Treatment has included: therapeutic exercise and neuromuscular re-education      Subjective      Objective   See Exercise, Manual, and Modality Logs for complete treatment.     25 1700   Total Minutes   58720 - PT Therapeutic Exercise Minutes 15   49786 -  PT Neuromuscular Reeducation Minutes 25   Exercise 1   Exercise Name 1 Urge delay training w/PF contraction- trialed in supine, then sitting, then minimally successfully during STS, unable to coordinate in standing   Cueing 1 Verbal;Demo   Time 1 25 mins including time for subjective   Exercise 2   Exercise Name 2 PF contraction wtih abdominal draw in + leg march, see scanned in chart   Cueing 2 Verbal;Tactile;Demo   Time 2 15   Exercise 3   Exercise Name 3 bridging with GTB for hip abduction   Cueing 3 Verbal;Tactile;Demo   Additional Comments cues for pushing thru inner heels for hamstring activation, cues for hip abduction, cues for work on exhale     PT Pelvic     Balance Skills Training  23356 -  PT Neuromuscular Reeducation Minutes: 25    Assessment & Plan       Assessment  Assessment details: Pt had improved core stability during abdominal exercise when adding in PF contraction. 3/5 STS w/PF contraction successful then she fatigued; plan to add PF contraction during transitional movements and/or during bridging next session. Patient would continue to benefit from skilled PT services to achieve highest level of functioning.      Plan  Plan details: Patient to  continue with PT services to improve PF coordination, PF strength, core strength, improve bowel/bladder function.            Therapy Charges for Today       Code Description Service Date Service Provider Modifiers Qty    16251774541 HC PT NEUROMUSC RE EDUCATION EA 15 MIN 4/1/2025 Cesia Granados, PT GP 2    45689046694 HC PT THER PROC EA 15 MIN 4/1/2025 Cesia Granados, PT GP 1          PT SIGNATURE: Cesia Granados PT, DPT  [place] License # [id number]  Physical Therapist

## 2025-04-01 NOTE — TELEPHONE ENCOUNTER
Rx Refill Note  Requested Prescriptions     Pending Prescriptions Disp Refills    omeprazole (priLOSEC) 20 MG capsule 90 capsule 1     Sig: Take 1 capsule by mouth Every Morning.      Last office visit with prescribing clinician: 11/7/2024     Next office visit with prescribing clinician: 5/14/2025       Lizeth Dooley  04/01/25, 14:55 EDT

## 2025-04-01 NOTE — TELEPHONE ENCOUNTER
Caller: Alan Cindy G    Relationship: Self    Best call back number: [phone]     Requested Prescriptions:   Requested Prescriptions     Pending Prescriptions Disp Refills    omeprazole (priLOSEC) 20 MG capsule 90 capsule 3     Sig: Take 1 capsule by mouth Every Morning.        Pharmacy where request should be sent: [id number] - [address] - [phone]  - [phone]     Last office visit with prescribing clinician: 11/7/2024   Last telemedicine visit with prescribing clinician: Visit date not found   Next office visit with prescribing clinician: 5/14/2025     Additional details provided by patient: PATIENT IS OUT.    Does the patient have less than a 3 day supply:  [x] Yes  [] No    Would you like a call back once the refill request has been completed: [] Yes [x] No    If the office needs to give you a call back, can they leave a voicemail: [] Yes [x] No    Cadance Dunaway, RegSched Rep   04/01/25 12:24 EDT

## 2025-04-07 ENCOUNTER — HOSPITAL ENCOUNTER (OUTPATIENT)
Dept: PHYSICAL THERAPY | Facility: HOSPITAL | Age: 80
Setting detail: THERAPIES SERIES
Discharge: HOME | End: 2025-04-07
Payer: MEDICARE

## 2025-04-07 DIAGNOSIS — R26.89 POOR BALANCE: ICD-10-CM

## 2025-04-07 DIAGNOSIS — R32 INCONTINENCE OF URINE IN FEMALE: Primary | ICD-10-CM

## 2025-04-07 PROCEDURE — 97110 THERAPEUTIC EXERCISES: CPT

## 2025-04-07 PROCEDURE — 97112 NEUROMUSCULAR REEDUCATION: CPT

## 2025-04-07 NOTE — THERAPY PROGRESS REPORT/RE-CERT
"PT Progress Note  [facility] [place]  [address]    Patient: Cindy Phillips   : 1945  Diagnosis/ICD-10 Code:      ICD-10-CM ICD-9-CM   1. Incontinence of urine in female  R32 788.30   2. Poor balance  R26.89 781.99      MRN: [id number]    Referring practitioner: Janey Adan MD  Today's Date: 2025    Subjective:   Patient reports: Patient reports she had formed BM after increasing her fiber last week. She needs help with her bridging HEP and is able to perform pelvic floor contractions in seated but was unable to perform during STS or in standing. 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  Pain: 0  Clinical Progress: improved  Home Program Compliance: Yes  Treatment has included: therapeutic exercise and neuromuscular re-education  Tug- 15.66  10m- 12.39    Objective   See Exercise, Manual, and Modality Logs for complete treatment.     25 0800   Total Minutes   80436 - PT Therapeutic Exercise Minutes 25   12636 -  PT Neuromuscular Reeducation Minutes 15   Exercise 1   Exercise Name 1 Urge delay training, seated>STS>walk/stop   Cueing 1 Verbal;Demo   Time 1 Pt unable to feel PF contraction during STS this date   Exercise 2   Exercise Name 2 PF contraction w/LE adduction-good success when complete prior to HEP of supine marching w/TA + PF contractions, see scanned in chart   Cueing 2 Verbal   Additional Comments \"exhale, belly sink, pelvic floor contraction, leg squeeze\"   Exercise 3   Exercise Name 3 bridging with GTB for hip abduction   Additional Comments trialed adding PF contraction-unable this date, but good success w/sequencing glute activation   Exercise 4   Exercise Name 4 balance reassessment, see outcomes     PT Neuro    Balance Skills Training  96336 -  PT Neuromuscular Reeducation Minutes: 15    Assessment & Plan       Assessment  Impairments: abnormal coordination, " abnormal gait, activity intolerance, impaired balance, impaired physical strength, lacks appropriate home exercise program, pain with function and safety issue   Functional limitations: carrying objects, lifting, walking, pulling, standing, stooping and reaching overhead   Assessment details: Patient has had significant improvements in efficiency of BM, amount of solid BM per week, and significantly decreased urinary urgency during the day. She has also improved in deep core activation and is able to consistently activate her core during exercise and now can usually activate in standing; this will aid in continued balance training. 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 Pt met STG for 10m walk this date. Patient to benefit from continued core and pelvic floor strengthening to achieve pelvic floor contraction during movement/standing to decrease late night leakage.   Prognosis: good    Goals  Plan Goals: STG (6 visits)  1. Patient to improve FOSTER balance score to >/= 40 /56 to decrease client's risk of falls. ONGOING  2. Patient to perform TUG within 12 sec with AD, without LOB for improved functional mobility. ONGOING  3. Patient to ambulate 10 meters with AD within 13 sec without LOB for improved gait ethel and functional mobility. met  4. Patient to ascend/descend 12 steps with B HRs and recip pattern without LOB for improved functional mobility at home. ONGOING  5. Patient to report 50% decreased leakage daily, for improved QOL. ONGOING 25% DECREASED  6. Patient to report 50% decreased urinary urgency for improved QOL and decreased fall risk. MET     LTG (12 visits)  1. Patient to improve FOSTER balance score to >/= 48/56 to decrease client's risk of falls. ONGOING  2. Patient to perform TUG within 11 sec with AD, without LOB for improved functional mobility. ONGOING  3. Patient to ambulate 10 meters with AD  "within 12 sec without LOB for improved gait ethel and functional mobility. ONGOING  4. Patient to perform stair climbing 12 steps with 1HR and recip pattern without LOB for improved functional mobility. ONGOING  5. Pt to perform sit to stand without UE A from at least 20\" height 3/3 trails without LOB for improved functional mobility.ONGOING  5. Patient to be I with HE. ONGOING  6. Patient to report 50% decreased leakage daily, for improved QOL. ONGOING  7. Patient to report 50% decreased urinary urgency for improved QOL and decreased fall risk. MET  8. Patient to report 4-5 BM per week with bristol stool chart average of 4-5, for improved PF function. CONT FOR CONSISTENCY      Plan  Therapy options: will be seen for skilled therapy services  Planned modality interventions: electrical stimulation/Russian stimulation  Planned therapy interventions: gait training, functional ROM exercises, home exercise program, neuromuscular re-education, strengthening, stretching, therapeutic activities, abdominal trunk stabilization, balance/weight-bearing training, postural training, manual therapy, body mechanics training, flexibility and soft tissue mobilization  Frequency: 1x week  Duration in visits: 3  Treatment plan discussed with: patient  Plan details: Patient will be seen 1x/wk x 3 visits with treatment to include strengthening, stretching, functional e-stim therapy, neuromuscular re-education, balance, gait and endurance training.         Progress toward previous goals: Partially Met      Recommendations: Continue as planned  Timeframe:  3 visits  Therapy Charges for Today       Code Description Service Date Service Provider Modifiers Qty    39498045407 HC PT NEUROMUSC RE EDUCATION EA 15 MIN 4/7/2025 Cesia Granados, PT GP 1    [id number] HC PT THER PROC EA 15 MIN 4/7/2025 Cesia Granados PT GP 2          PT Signature: Cesia Granados PT, DPT  [place] License #: [id number]    Based upon review of the patient's progress and continued " therapy plan, it is my medical opinion that Cindy Phillips should continue physical therapy treatment at [facility] OP PT at [facility].    Signature: __________________________________  Janey Adan MD

## 2025-04-15 ENCOUNTER — HOSPITAL ENCOUNTER (OUTPATIENT)
Dept: PHYSICAL THERAPY | Facility: HOSPITAL | Age: 80
Setting detail: THERAPIES SERIES
Discharge: HOME | End: 2025-04-15
Payer: MEDICARE

## 2025-04-15 DIAGNOSIS — R26.89 POOR BALANCE: Primary | ICD-10-CM

## 2025-04-15 PROCEDURE — 97112 NEUROMUSCULAR REEDUCATION: CPT | Performed by: PHYSICAL THERAPIST

## 2025-04-15 PROCEDURE — 97110 THERAPEUTIC EXERCISES: CPT | Performed by: PHYSICAL THERAPIST

## 2025-04-15 NOTE — THERAPY TREATMENT NOTE
"Physical Therapy Daily Note      Patient: Cindy Phillips   : 1945  MRN: [id number]   Diagnosis/ICD-10 Code:      ICD-10-CM ICD-9-CM   1. Poor balance  R26.89 781.99      Referring practitioner: Janey Adan MD  Today's Date: 4/15/2025      Subjective:   Patient reports: \"It's hard for me to contract my muscles like Cesia asks me to.  I've also gotten a bruise in my sleep.\"  Pain: 0/10    Objective   See Exercise, Manual, and Modality Logs for complete treatment.    Exercise 1  Exercise Name 1: NuStep B UE/LEs; level 6  Time 1: 8 mins  Additional Comments: 51 spm  Exercise 2  Exercise Name 2: DLP  Sets 2: 2  Time 2: 2 min  Additional Comments: total gym  Exercise 3  Exercise Name 3: St balance on blue foam beam staggered with VCing to lift head and then added holding position with single and B UE reaching across midline and overhead; B sidestepping along beam; B sidestepping along beam  Reps 3: 10  Additional Comments: blue foam beam  Exercise 4  Exercise Name 4: B LEs fwd steping up onto/off rounded side of BOSU with B UE ; standing on both sides of BOSU with mini-squats  Reps 4: 10  Additional Comments: BOSU  Exercise 5  Exercise Name 5: Balance with one foot on blue foam and other on slider and performing posterior sliding lunge with B UE A  Reps 5: 10  Additional Comments: blue foam; slider     PT Neuro       Assessment & Plan       Assessment  Assessment details: Pt requires min A with standing dynamic balance with LOB up to 40% of the time.  Pt continues to demonstrate difficulty with keeping head lifted with standing balance on uneven surfaces.  Pt to benefit from skilled PT services to improve overall functional mobility.    Plan  Plan details: Pt to benefit from skilled PT services to improve gait, balance, strength, and overall functional mobility. CPT codes to include: 85217, 47541, 43396, 28654 and 76494              Therapy Charges for Today       Code Description Service Date Service " Provider Modifiers Qty    [id number]  PT NEUROMUSC RE EDUCATION EA 15 MIN 4/15/2025 Umu Rojo, PT GP 1    42529405246 HC PT THER PROC EA 15 MIN 4/15/2025 Umu Rojo, PT GP 2            Umu Rojo, PT  [place] License #: [id number]    Physical Therapist

## 2025-04-22 ENCOUNTER — HOSPITAL ENCOUNTER (OUTPATIENT)
Dept: PHYSICAL THERAPY | Facility: HOSPITAL | Age: 80
Setting detail: THERAPIES SERIES
Discharge: HOME | End: 2025-04-22
Payer: MEDICARE

## 2025-04-22 ENCOUNTER — TRANSCRIBE ORDERS (OUTPATIENT)
Dept: PHYSICAL THERAPY | Facility: HOSPITAL | Age: 80
End: 2025-04-22
Payer: MEDICARE

## 2025-04-22 DIAGNOSIS — R26.89 POOR BALANCE: Primary | ICD-10-CM

## 2025-04-22 PROCEDURE — 97112 NEUROMUSCULAR REEDUCATION: CPT | Performed by: PHYSICAL THERAPIST

## 2025-04-22 PROCEDURE — 97110 THERAPEUTIC EXERCISES: CPT | Performed by: PHYSICAL THERAPIST

## 2025-04-22 NOTE — PROGRESS NOTES
"PT Progress Note        Patient: Cindy Phillips   : 1945  MRN: [id number]   Diagnosis/ICD-10 Code:      ICD-10-CM ICD-9-CM   1. Poor balance  R26.89 781.99      Referring practitioner: Janey Adan MD  Today's Date: 2025    Subjective:   Cindy Phillips reports: \"I'm doing good.  My left leg has always been a little weaker than the right.\"  Subjective Questionnaire: n/a  Clinical Progress: improved  Home Program Compliance: Yes  Treatment has included: therapeutic exercise, neuromuscular re-education, therapeutic activity, and gait training    Subjective   Objective     Exercise 1  Exercise Name 1: NuStep B UE/LEs; level 6  Time 1: 8 mins  Additional Comments: 56 spm  Exercise 2  Exercise Name 2: DLP  Sets 2: 2  Time 2: 2 min  Exercise 3  Exercise Name 3: B SLP  Sets 3: 1  Time 3: 1 min  Exercise 4  Exercise Name 4: Re-assessment completed, see for details  Exercise 5  Exercise Name 5: ST balance on rocker board R/L and fwd/bwd with head turns R/L and up/down  Reps 5: 12  Additional Comments: min A; LOB 40% of the time  Exercise 6  Exercise Name 6: fwd walking over five orange hurdles and B sidestepping over two hurdles without UE A; VCing for pt not to pull on pants to lift LEs for clearance  Time 6: 5 min  Additional Comments: 5 orange hurdles       Functional Testing  Outcome Measure Options: Foster Balance, Timed Up and Go (TUG)  TU.35 sec  FOSTER/56    PT Neuro    Balance Skills Training  83384 -  PT Neuromuscular Reeducation Minutes: 30    Assessment & Plan       Assessment  Impairments: abnormal coordination, abnormal gait, activity intolerance, impaired balance, impaired physical strength, lacks appropriate home exercise program, pain with function and safety issue   Functional limitations: carrying objects, lifting, walking, pulling, standing, stooping and reaching overhead   Assessment details: Pt did not meet any goals today but did improve FOSTER balance score and TUG.  Pt requires min " "A with standing balance on rocker board with LOB up to 40% of the time.  Pt has difficulty with B LE clearance with stepping over orange hurdles without UE A with pt pulling on pants to lift LEs.  Pt to benefit from skilled PT services to improve overall functional mobility, strength and balance to meet goals and urinary issues.  Prognosis: good    Goals  Plan Goals: STG (6 visits)  1. Patient to improve FOSTER balance score to >/= 40 /56 to decrease client's risk of falls. ONGOING  2. Patient to perform TUG within 12 sec with AD, without LOB for improved functional mobility. ONGOING  3. Patient to ambulate 10 meters with AD within 13 sec without LOB for improved gait ethel and functional mobility. ONGOING  4. Patient to ascend/descend 12 steps with B HRs and recip pattern without LOB for improved functional mobility at home. ONGOING  5. Patient to report 50% decreased leakage daily, for improved QOL. NEW  6. Patient to report 50% decreased urinary urgency for improved QOL and decreased fall risk. NEW     LTG (12 visits)  1. Patient to improve FOSTER balance score to >/= 48/56 to decrease client's risk of falls. ONGOING  2. Patient to perform TUG within 11 sec with AD, without LOB for improved functional mobility. ONGOING  3. Patient to ambulate 10 meters with AD within 12 sec without LOB for improved gait ethel and functional mobility. ONGOING  4. Patient to perform stair climbing 12 steps with 1HR and recip pattern without LOB for improved functional mobility. ONGOING  5. Pt to perform sit to stand without UE A from at least 20\" height 3/3 trails without LOB for improved functional mobility.ONGOING  5. Patient to be I with HE. ONGOING  6. Patient to report 50% decreased leakage daily, for improved QOL. NEW  7. Patient to report 50% decreased urinary urgency for improved QOL and decreased fall risk. NEW  8. Patient to report 4-5 BM per week with bristol stool chart average of 4-5, for improved PF function. " NEW      Plan  Therapy options: will be seen for skilled therapy services  Planned modality interventions: electrical stimulation/Russian stimulation  Planned therapy interventions: gait training, functional ROM exercises, home exercise program, neuromuscular re-education, strengthening, stretching, therapeutic activities, abdominal trunk stabilization, balance/weight-bearing training, postural training, manual therapy, body mechanics training, flexibility and soft tissue mobilization  Frequency: 1x week  Duration in visits: 8  Treatment plan discussed with: patient  Plan details: Patient will be seen 1x/wk x 8 visits with treatment to include strengthening, stretching,  neuromuscular re-education, balance, gait and endurance training.         Progress toward previous goals: ongoing      Recommendations: Continue as planned  Timeframe: 8 visits  Prognosis to achieve goals: good    Therapy Charges for Today       Code Description Service Date Service Provider Modifiers Qty    05209815763 HC PT NEUROMUSC RE EDUCATION EA 15 MIN 4/22/2025 Umu Rojo, PT GP 2    [id number]  PT THER PROC EA 15 MIN 4/22/2025 Umu Rojo, PT GP 1            PT Signature: Umu Rojo PT  [place] License #: [id number]    Based upon review of the patient's progress and continued therapy plan, it is my medical opinion that Cindy Phillips should continue physical therapy treatment at [facility] OP PT at [facility].    Signature: __________________________________  Janey Adan MD

## 2025-04-29 ENCOUNTER — HOSPITAL ENCOUNTER (OUTPATIENT)
Dept: PHYSICAL THERAPY | Facility: HOSPITAL | Age: 80
Setting detail: THERAPIES SERIES
Discharge: HOME | End: 2025-04-29
Payer: MEDICARE

## 2025-04-29 DIAGNOSIS — R26.89 POOR BALANCE: Primary | ICD-10-CM

## 2025-04-29 DIAGNOSIS — R32 INCONTINENCE OF URINE IN FEMALE: ICD-10-CM

## 2025-04-29 PROCEDURE — 97110 THERAPEUTIC EXERCISES: CPT

## 2025-04-29 PROCEDURE — 97112 NEUROMUSCULAR REEDUCATION: CPT

## 2025-04-29 NOTE — THERAPY TREATMENT NOTE
Physical Therapy Daily Note      Patient: Cindy Phillips   : 1945  MRN: [id number]   Diagnosis/ICD-10 Code:      ICD-10-CM ICD-9-CM   1. Poor balance  R26.89 781.99   2. Incontinence of urine in female  R32 788.30      Referring practitioner: Janey Adan MD  Today's Date: 2025    Subjective:   Patient reports: patient wants to review pelvic floor HEP  Pain: 0  Treatment has included: therapeutic exercise and neuromuscular re-education    Objective   See Exercise, Manual, and Modality Logs for complete treatment.     25 1400   Total Minutes   34703 - PT Therapeutic Exercise Minutes 12   03726 -  PT Neuromuscular Reeducation Minutes 26   Exercise 1   Exercise Name 1 NuStep B UE/LEs; level 6   Time 1 8 mins   Additional Comments 53 SPM   Exercise 2   Exercise Name 2 review of bridging-Pt to flip around and put feet at headrest in bed to prevent LE sliding, review of abdominal exercises.   Cueing 2 Verbal;Demo   Time 2 20 minutes   Exercise 3   Exercise Name 3 Seated PF contraction w/pillow adduction progressing to STS, then PF contraction/adduction in standing at rollator   Cueing 3 Verbal;Demo   Time 3 10 minutes   Additional Comments many trials, Pt had better success with abomdinal contraction + hip adductor contraction w/pillow to achieve PF contraction during movement and in standing     PT Neuro     Balance Skills Training  76847 -  PT Neuromuscular Reeducation Minutes: 26    Assessment & Plan       Assessment  Assessment details: Session focused on problem solving HEP issues and progressing PF strength training during movement and in standing. Pt had better success achieving PF contraction by adding hip adductor squeeze w/pillow. Patient would continue to benefit from skilled PT services to achieve highest level of functioning.      Plan  Plan details: Patient to continue with PT services to improve gait, balance, strength, transfers and overall functional mobility.           Therapy  Charges for Today       Code Description Service Date Service Provider Modifiers Qty    13782861189 HC PT NEUROMUSC RE EDUCATION EA 15 MIN 4/29/2025 Cesia Granados, PT GP 2    15265560023 HC PT THER PROC EA 15 MIN 4/29/2025 Cesia Granados, PT GP 1            PT SIGNATURE: Cesia Granados PT, DPT  [place] License # [id number]  Physical Therapist

## 2025-05-02 ENCOUNTER — TELEPHONE (OUTPATIENT)
Dept: INTERNAL MEDICINE | Facility: CLINIC | Age: 80
End: 2025-05-02

## 2025-05-02 DIAGNOSIS — Z78.0 POSTMENOPAUSAL: Primary | ICD-10-CM

## 2025-05-02 NOTE — TELEPHONE ENCOUNTER
Caller: Cindy Phillips    Relationship: Self    Best call back number: [phone]     What orders are you requesting (i.e. lab or imaging): DEXA SCAN    In what timeframe would the patient need to come in: AS SOON AS POSSIBLE    Where will you receive your lab/imaging services: [facility]    Additional notes: PATIENT STATES SHE HAS ORDERS FOR A MAMMOGRAM ALREADY, BUT WOULD LIKE TO DO HER DEXA SCAN AT THE SAME TIME, SO WOULD LIKE TO SEE IF DR. HERNANDEZ WOULD GO AHEAD AND PUT AN ORDER IN FOR THIS AS WELL.

## 2025-05-02 NOTE — TELEPHONE ENCOUNTER
HUB can relay:  I put in the order for the bone density if she wants to call and get both scheduled

## 2025-05-06 ENCOUNTER — HOSPITAL ENCOUNTER (OUTPATIENT)
Dept: PHYSICAL THERAPY | Facility: HOSPITAL | Age: 80
Setting detail: THERAPIES SERIES
Discharge: HOME | End: 2025-05-06
Payer: MEDICARE

## 2025-05-06 DIAGNOSIS — R26.89 POOR BALANCE: Primary | ICD-10-CM

## 2025-05-06 PROCEDURE — 97112 NEUROMUSCULAR REEDUCATION: CPT | Performed by: PHYSICAL THERAPIST

## 2025-05-06 PROCEDURE — 97110 THERAPEUTIC EXERCISES: CPT | Performed by: PHYSICAL THERAPIST

## 2025-05-06 NOTE — THERAPY TREATMENT NOTE
"Physical Therapy Daily Note      Patient: Cindy Phillips   : 1945  MRN: [id number]   Diagnosis/ICD-10 Code:      ICD-10-CM ICD-9-CM   1. Poor balance  R26.89 781.99      Referring practitioner: Janey Adan MD  Today's Date: 2025      Subjective:   Patient reports: \"My left leg has been feeling weaker.  If the wall wasn't there I would of been on thf floor earlier today.\"  Pain: 0/10    Objective   See Exercise, Manual, and Modality Logs for complete treatment.    Exercise 1  Exercise Name 1: NuStep B UE/LEs; level 6  Time 1: 8 mins  Exercise 2  Exercise Name 2: DLP  Sets 2: 2  Time 2: 2 min  Exercise 3  Exercise Name 3: St balance with B UE A on TRX with fwd, B sidestepping and bwd stepping over half foam roll with large amplitude arm movements  Reps 3: 10  Additional Comments: TRX; half foam roll; min A  Exercise 4  Exercise Name 4: St balance on rocker board R/L and fwd bwd with head turns R/L and looking up/down  Reps 4: 10  Additional Comments: rocker board; min A  Exercise 5  Exercise Name 5: St balance on blue foam with rull turns R/L and then EC with pertubations from therapist  Additional Comments: blue foam; min A; LOB 20% of the time         PT Neuro     Balance Skills Training  44452 -  PT Neuromuscular Reeducation Minutes: 30    Assessment & Plan       Assessment  Assessment details: Pt requires min A with standing dynamic balance with LOB up to 20% of the time.  Pt demonstrates ability to keep balance with EC and pertubation's from therapist.  Pt to benefit from skilled PT services to meet goals.    Plan  Plan details: Pt to continue with PT services to improve gait, balance, strength, and overall functional mobility.          Therapy Charges for Today       Code Description Service Date Service Provider Modifiers Qty    20505099329  PT NEUROMUSC RE EDUCATION EA 15 MIN 2025 Umu Rojo, PT GP 2    [id number]  PT THER PROC EA 15 MIN 2025 Umu Rojo, PT GP 1 "            Umu Rojo, PT  [place] License #: [id number]    Physical Therapist

## 2025-05-08 ENCOUNTER — TRANSCRIBE ORDERS (OUTPATIENT)
Dept: ADMINISTRATIVE | Facility: HOSPITAL | Age: 80
End: 2025-05-08
Payer: MEDICARE

## 2025-05-08 DIAGNOSIS — Z12.31 VISIT FOR SCREENING MAMMOGRAM: Primary | ICD-10-CM

## 2025-05-12 ENCOUNTER — HOSPITAL ENCOUNTER (OUTPATIENT)
Dept: PHYSICAL THERAPY | Facility: HOSPITAL | Age: 80
Setting detail: THERAPIES SERIES
Discharge: HOME | End: 2025-05-12
Payer: MEDICARE

## 2025-05-12 DIAGNOSIS — R32 INCONTINENCE OF URINE IN FEMALE: ICD-10-CM

## 2025-05-12 DIAGNOSIS — R26.89 POOR BALANCE: Primary | ICD-10-CM

## 2025-05-12 PROCEDURE — 97110 THERAPEUTIC EXERCISES: CPT

## 2025-05-12 PROCEDURE — 97112 NEUROMUSCULAR REEDUCATION: CPT

## 2025-05-12 NOTE — THERAPY TREATMENT NOTE
Physical Therapy Daily Note      Patient: Cindy Phillips   : 1945  MRN: [id number]   Diagnosis/ICD-10 Code:      ICD-10-CM ICD-9-CM   1. Poor balance  R26.89 781.99   2. Incontinence of urine in female  R32 788.30      Referring practitioner: Janey Adan MD  Today's Date: 2025    Subjective:   Patient reports: Pt had 1 day of unformed stool but other than that all formed stool, complete emptying every time but once past week. 1-2 nights of urine leakage upon standing.   Pain: 0  Treatment has included: therapeutic exercise and neuromuscular re-education      Subjective      Objective   See Exercise, Manual, and Modality Logs for complete treatment.     25 1400   Total Minutes   71786 - PT Therapeutic Exercise Minutes 10   13472 -  PT Neuromuscular Reeducation Minutes 28   Exercise 2   Exercise Name 2 Review of HEP and bridging w/ PF contraction   Exercise 3   Exercise Name 3 DLP   Reps 3 10   Exercise 4   Exercise Name 4 DLP w/ hip adduction and PF contraction   Reps 4 2 x 10   Additional Comments improved sensation w/gravity eliminated   Exercise 5   Exercise Name 5 STS w/PF contraction + 1 PF contraction in standing for urge delay training   Cueing 5 Verbal;Demo   Time 5 15 mins   Additional Comments HEP, see scanned in chart     PT Pelvic     Balance Skills Training  17702 -  PT Neuromuscular Reeducation Minutes: 28    Assessment & Plan       Assessment  Assessment details: Increased ability to sense PF contraction during STS/urge delay training after PF contractions on 1/2 gravity eliminated DLP. Patient has made marked progress in formed BM that are more complete emptying and in decreased # of episodes of urine leakage during functional movements. Patient would continue to benefit from skilled PT services to achieve highest level of functioning.      Plan  Plan details: Patient to continue with PT services to improve PF coordination, PF strength, core strength, decrease pain and improve  bowel/bladder function.            Therapy Charges for Today       Code Description Service Date Service Provider Modifiers Qty    76113651821 HC PT NEUROMUSC RE EDUCATION EA 15 MIN 5/12/2025 Cesia Granados, PT GP 2    22831204440 HC PT THER PROC EA 15 MIN 5/12/2025 Cesia Granados, PT GP 1          PT SIGNATURE: Cesia Granados PT, DPT  [place] License # [id number]  Physical Therapist

## 2025-05-14 ENCOUNTER — LAB (OUTPATIENT)
Dept: INTERNAL MEDICINE | Facility: CLINIC | Age: 80
End: 2025-05-14
Payer: MEDICARE

## 2025-05-14 ENCOUNTER — OFFICE VISIT (OUTPATIENT)
Dept: INTERNAL MEDICINE | Facility: CLINIC | Age: 80
End: 2025-05-14
Payer: MEDICARE

## 2025-05-14 VITALS
OXYGEN SATURATION: 94 % | HEART RATE: 64 BPM | WEIGHT: 162.2 LBS | HEIGHT: 64 IN | BODY MASS INDEX: 27.69 KG/M2 | RESPIRATION RATE: 16 BRPM | TEMPERATURE: 97.5 F | DIASTOLIC BLOOD PRESSURE: 66 MMHG | SYSTOLIC BLOOD PRESSURE: 96 MMHG

## 2025-05-14 DIAGNOSIS — Z00.00 ROUTINE GENERAL MEDICAL EXAMINATION AT A HEALTH CARE FACILITY: ICD-10-CM

## 2025-05-14 DIAGNOSIS — E78.00 PURE HYPERCHOLESTEROLEMIA: Chronic | ICD-10-CM

## 2025-05-14 DIAGNOSIS — Z23 NEED FOR PNEUMOCOCCAL 20-VALENT CONJUGATE VACCINATION: ICD-10-CM

## 2025-05-14 DIAGNOSIS — Z00.00 ENCOUNTER FOR MEDICARE ANNUAL WELLNESS EXAM: Primary | ICD-10-CM

## 2025-05-14 DIAGNOSIS — Z12.11 COLON CANCER SCREENING: ICD-10-CM

## 2025-05-14 DIAGNOSIS — I10 ESSENTIAL HYPERTENSION: Chronic | ICD-10-CM

## 2025-05-14 LAB
ALBUMIN SERPL-MCNC: 4.1 G/DL (ref 3.5–5.2)
ALBUMIN/GLOB SERPL: 1.2 G/DL
ALP SERPL-CCNC: 59 U/L (ref 39–117)
ALT SERPL W P-5'-P-CCNC: 8 U/L (ref 1–33)
ANION GAP SERPL CALCULATED.3IONS-SCNC: 14.4 MMOL/L (ref 5–15)
AST SERPL-CCNC: 23 U/L (ref 1–32)
BILIRUB SERPL-MCNC: 0.5 MG/DL (ref 0–1.2)
BUN SERPL-MCNC: 18 MG/DL (ref 8–23)
BUN/CREAT SERPL: 22.5 (ref 7–25)
CALCIUM SPEC-SCNC: 9.5 MG/DL (ref 8.6–10.5)
CHLORIDE SERPL-SCNC: 104 MMOL/L (ref 98–107)
CHOLEST SERPL-MCNC: 212 MG/DL (ref 0–200)
CO2 SERPL-SCNC: 22.6 MMOL/L (ref 22–29)
CREAT SERPL-MCNC: 0.8 MG/DL (ref 0.57–1)
DEPRECATED RDW RBC AUTO: 43.7 FL (ref 37–54)
EGFRCR SERPLBLD CKD-EPI 2021: 75.5 ML/MIN/1.73
ERYTHROCYTE [DISTWIDTH] IN BLOOD BY AUTOMATED COUNT: 13.1 % (ref 12.3–15.4)
GLOBULIN UR ELPH-MCNC: 3.3 GM/DL
GLUCOSE SERPL-MCNC: 91 MG/DL (ref 65–99)
HCT VFR BLD AUTO: 39.9 % (ref 34–46.6)
HDLC SERPL-MCNC: 71 MG/DL (ref 40–60)
HGB BLD-MCNC: 13.8 G/DL (ref 12–15.9)
LDLC SERPL CALC-MCNC: 130 MG/DL (ref 0–100)
LDLC/HDLC SERPL: 1.82 {RATIO}
MCH RBC QN AUTO: 31.7 PG (ref 26.6–33)
MCHC RBC AUTO-ENTMCNC: 34.6 G/DL (ref 31.5–35.7)
MCV RBC AUTO: 91.5 FL (ref 79–97)
PLATELET # BLD AUTO: 343 10*3/MM3 (ref 140–450)
PMV BLD AUTO: 10.5 FL (ref 6–12)
POTASSIUM SERPL-SCNC: 4.1 MMOL/L (ref 3.5–5.2)
PROT SERPL-MCNC: 7.4 G/DL (ref 6–8.5)
RBC # BLD AUTO: 4.36 10*6/MM3 (ref 3.77–5.28)
SODIUM SERPL-SCNC: 141 MMOL/L (ref 136–145)
TRIGL SERPL-MCNC: 60 MG/DL (ref 0–150)
TSH SERPL DL<=0.05 MIU/L-ACNC: 1.41 UIU/ML (ref 0.27–4.2)
VLDLC SERPL-MCNC: 11 MG/DL (ref 5–40)
WBC NRBC COR # BLD AUTO: 5.86 10*3/MM3 (ref 3.4–10.8)

## 2025-05-14 PROCEDURE — 85027 COMPLETE CBC AUTOMATED: CPT | Performed by: INTERNAL MEDICINE

## 2025-05-14 PROCEDURE — 84443 ASSAY THYROID STIM HORMONE: CPT | Performed by: INTERNAL MEDICINE

## 2025-05-14 PROCEDURE — 80053 COMPREHEN METABOLIC PANEL: CPT | Performed by: INTERNAL MEDICINE

## 2025-05-14 PROCEDURE — 36415 COLL VENOUS BLD VENIPUNCTURE: CPT | Performed by: INTERNAL MEDICINE

## 2025-05-14 PROCEDURE — 80061 LIPID PANEL: CPT | Performed by: INTERNAL MEDICINE

## 2025-05-14 RX ORDER — ALENDRONATE SODIUM 70 MG/1
70 TABLET ORAL
Qty: 12 TABLET | Refills: 3 | Status: SHIPPED | OUTPATIENT
Start: 2025-05-14

## 2025-05-14 RX ORDER — METOPROLOL SUCCINATE 50 MG/1
50 TABLET, EXTENDED RELEASE ORAL DAILY
Qty: 90 TABLET | Refills: 1 | Status: SHIPPED | OUTPATIENT
Start: 2025-05-14

## 2025-05-14 RX ORDER — PRAVASTATIN SODIUM 40 MG
40 TABLET ORAL DAILY
Qty: 90 TABLET | Refills: 1 | Status: SHIPPED | OUTPATIENT
Start: 2025-05-14

## 2025-05-14 NOTE — ASSESSMENT & PLAN NOTE
Orders:    CBC (No Diff); Future    TSH Rfx On Abnormal To Free T4; Future    Lipid Panel; Future    Comprehensive Metabolic Panel; Future

## 2025-05-14 NOTE — PROGRESS NOTES
Subjective   The ABCs of the Annual Wellness Visit  Medicare Wellness Visit      Cindy Phillips is a 79 y.o. patient who presents for a Medicare Wellness Visit.    The following portions of the patient's history were reviewed and   updated as appropriate: allergies, current medications, past family history, past medical history, past social history, past surgical history, and problem list.    Compared to one year ago, the patient's physical   health is better.  Compared to one year ago, the patient's mental   health is the same.    Recent Hospitalizations:  She was not admitted to the hospital during the last year.     Current Medical Providers:  Patient Care Team:  Janey Adan MD as PCP - General (Internal Medicine)  Tino Deutsch MD as Consulting Physician (Orthopedic Surgery)  Victor Manuel Dash OD as Consulting Physician (Optometry)  Nestor Ortiz MD as Consulting Physician (Gastroenterology)  Ebony Williamson MD as Consulting Physician (Orthopedic Surgery)  Cinthya (Dermatology)  To Melgar MD as Consulting Physician (Otolaryngology)  Victor Manuel Sam DDS (Dental General Practice)  Valdez Araujo MD as Consulting Physician (Neurology)    Outpatient Medications Prior to Visit   Medication Sig Dispense Refill    alendronate (FOSAMAX) 70 MG tablet TAKE 1 TABLET BY MOUTH ONCE EVERY 7 DAYS 12 tablet 3    Calcium Carbonate-Vitamin D (CALCIUM-D PO) Take  by mouth Daily.      MAGNESIUM PO Take  by mouth Daily.      metoprolol succinate XL (TOPROL-XL) 50 MG 24 hr tablet Take 1 tablet by mouth Daily. 90 tablet 0    naproxen (EC NAPROSYN) 500 MG EC tablet Take 1 tablet by mouth 2 (Two) Times a Day With Meals. (Patient taking differently: Take 1 tablet by mouth 2 (Two) Times a Day As Needed.) 60 tablet 0    omeprazole (priLOSEC) 20 MG capsule Take 1 capsule by mouth Every Morning. 90 capsule 1    pravastatin (PRAVACHOL) 40 MG tablet Take 1 tablet by mouth Daily. 90 tablet 0    VITAMIN D PO  "Take  by mouth Daily.      fluticasone (FLONASE) 50 MCG/ACT nasal spray INSTILL 2 SPRAYS IN EACH NOSTRIL DAILY AS DIRECTED (Patient not taking: Reported on 5/14/2025) 16 g 11    methocarbamol (ROBAXIN) 500 MG tablet TAKE 1 TABLET BY MOUTH 3 TIMES A DAY AS NEEDED FOR BACK PAIN (Patient not taking: Reported on 5/14/2025) 30 tablet 11    VITAMIN E PO Take  by mouth. (Patient not taking: Reported on 11/7/2024)       No facility-administered medications prior to visit.     No opioid medication identified on active medication list. I have reviewed chart for other potential  high risk medication/s and harmful drug interactions in the elderly.      Aspirin is not on active medication list.  Aspirin use is not indicated based on review of current medical condition/s. Risk of harm outweighs potential benefits.  .    Patient Active Problem List   Diagnosis    Pure hypercholesterolemia    Essential hypertension    High arches    Screen for colon cancer    Closed fracture of proximal end of left humerus with routine healing    Nasal fracture    Obesity (BMI 30-39.9)    Hyperparathyroidism    Osteoarthritis    Poor balance    Osteopenia of neck of femur    Osteopenia     Advance Care Planning Advance Directive is on file.  ACP discussion was held with the patient during this visit. Patient has an advance directive in EMR which is still valid.             Objective   Vitals:    05/14/25 1313   BP: 96/66   BP Location: Right arm   Patient Position: Sitting   Cuff Size: Adult   Pulse: 64   Resp: 16   Temp: 97.5 °F (36.4 °C)   TempSrc: Infrared   SpO2: 94%   Weight: 73.6 kg (162 lb 3.2 oz)   Height: 161.3 cm (63.5\")   PainSc: 0-No pain       Estimated body mass index is 28.28 kg/m² as calculated from the following:    Height as of this encounter: 161.3 cm (63.5\").    Weight as of this encounter: 73.6 kg (162 lb 3.2 oz).                Does the patient have evidence of cognitive impairment? No   She will forget a name or word but no " problems with driving,medications                                                                                             Health  Risk Assessment    Smoking Status:  Social History     Tobacco Use   Smoking Status Never    Passive exposure: Never   Smokeless Tobacco Never     Alcohol Consumption:  Social History     Substance and Sexual Activity   Alcohol Use Yes    Alcohol/week: 1.0 standard drink of alcohol    Types: 1 Glasses of wine per week    Comment: Wine once month is possible       Fall Risk Screen  MIGUEL Fall Risk Assessment was completed, and patient is at HIGH risk for falls. Assessment completed on:2025    Depression Screening   Little interest or pleasure in doing things? Not at all   Feeling down, depressed, or hopeless? Not at all   PHQ-2 Total Score 0      Health Habits and Functional and Cognitive Screenin/14/2025     1:09 PM   Functional & Cognitive Status   Do you have difficulty preparing food and eating? No   Do you have difficulty bathing yourself, getting dressed or grooming yourself? No   Do you have difficulty using the toilet? No   Do you have difficulty moving around from place to place? Yes   Do you have trouble with steps or getting out of a bed or a chair? Yes   Current Diet Limited Junk Food   Dental Exam Up to date   Eye Exam Up to date   Exercise (times per week) 7 times per week   Current Exercises Include Walking;Other        Exercise Comment Physical Therapy   Do you need help using the phone?  No   Are you deaf or do you have serious difficulty hearing?  No   Do you need help to go to places out of walking distance? No   Do you need help shopping? No   Do you need help preparing meals?  No   Do you need help with housework?  No   Do you need help with laundry? No   Do you need help taking your medications? No   Do you need help managing money? No   Do you ever drive or ride in a car without wearing a seat belt? No   Have you felt unusual stress, anger or  loneliness in the last month? No   Who do you live with? Spouse   If you need help, do you have trouble finding someone available to you? No   Have you been bothered in the last four weeks by sexual problems? No   Do you have difficulty concentrating, remembering or making decisions? Yes   e        Age-appropriate Screening Schedule:  Refer to the list below for future screening recommendations based on patient's age, sex and/or medical conditions. Orders for these recommended tests are listed in the plan section. The patient has been provided with a written plan.    Health Maintenance List  Health Maintenance   Topic Date Due    ZOSTER VACCINE (1 of 2) Never done    RSV Vaccine - Adults (1 - 1-dose 75+ series) Never done    ANNUAL WELLNESS VISIT  05/08/2025    DXA SCAN  05/18/2025    COLORECTAL CANCER SCREENING  08/20/2025    INFLUENZA VACCINE  07/01/2025    COVID-19 Vaccine (8 - 2024-25 season) 09/10/2025    LIPID PANEL  11/07/2025    HEPATITIS C SCREENING  Completed    Pneumococcal Vaccine 50+  Completed    MAMMOGRAM  Discontinued    TDAP/TD VACCINES  Discontinued                                                                                                                                                CMS Preventative Services Quick Reference  Risk Factors Identified During Encounter  Fall Risk-High or Moderate: Discussed Fall Prevention in the home    The above risks/problems have been discussed with the patient.  Pertinent information has been shared with the patient in the After Visit Summary.  An After Visit Summary and PPPS were made available to the patient.    Follow Up:   Next Medicare Wellness visit to be scheduled in 1 year.         Additional E&M Note during same encounter follows:  Patient has additional, significant, and separately identifiable condition(s)/problem(s) that require work above and beyond the Medicare Wellness Visit     Chief Complaint  Medicare Wellness-subsequent    Subjective  "  HPI      Review of Systems   Constitutional:  Negative for activity change, appetite change, fatigue and fever.   Respiratory:  Negative for shortness of breath.    Cardiovascular:  Negative for chest pain and leg swelling.   Gastrointestinal:  Negative for blood in stool and constipation (bm are every 3 days, occasionally will strain).   Musculoskeletal:  Positive for gait problem (off balance).   Allergic/Immunologic: Negative for immunocompromised state.   Neurological:  Negative for seizures, syncope, speech difficulty, weakness and confusion.        Hypertension-she is on metoprolol 50 daily. Nurse checked last week and was 130/70 range. Generally when she checks gets 120-130/60-70 range. Does not feel light headed today. Has not eaten today    Osteoporosis-she is on alendronate since '21    Frequent falls-family history of familial spastic paraparesis.  She has done some PT and did see neurology; doing PT currently  She does use her walker. She does not have to use stairs in her house. Does have a high toilet and a walkin shower    Hyperlipidemia-patient is on pravastatin    She is doing some pelvic floor therapy over last few weeks    Exercise:  does some stretches and arm/leg exercises  daily.  Unable to walk for distances because of her falls  Diet: healthy, though likes sweet tea and sweets but tries to watch. She is trying to cut back on the tea.  Not a lot of red meat.mg, ca/D. She is on metamucil daily    Objective   Vital Signs:  BP 96/66 (BP Location: Right arm, Patient Position: Sitting, Cuff Size: Adult)   Pulse 64   Temp 97.5 °F (36.4 °C) (Infrared)   Resp 16   Ht 161.3 cm (63.5\")   Wt 73.6 kg (162 lb 3.2 oz)   SpO2 94%   BMI 28.28 kg/m²   Physical Exam  Vitals and nursing note reviewed.   Constitutional:       General: She is not in acute distress.     Appearance: She is well-developed. She is not diaphoretic.   HENT:      Head: Normocephalic and atraumatic.      Right Ear: External ear " normal.      Left Ear: External ear normal.      Nose: Nose normal.      Mouth/Throat:      Pharynx: No oropharyngeal exudate.   Eyes:      General: No scleral icterus.        Right eye: No discharge.         Left eye: No discharge.      Conjunctiva/sclera: Conjunctivae normal.      Pupils: Pupils are equal, round, and reactive to light.   Neck:      Thyroid: No thyromegaly.   Cardiovascular:      Rate and Rhythm: Normal rate and regular rhythm.      Heart sounds: Normal heart sounds. No murmur heard.     No friction rub. No gallop.   Pulmonary:      Effort: Pulmonary effort is normal. No respiratory distress.      Breath sounds: Normal breath sounds. No wheezing or rales.   Chest:   Breasts:     Right: No mass, nipple discharge, skin change or tenderness.      Left: No mass, nipple discharge, skin change or tenderness.   Abdominal:      General: Bowel sounds are normal. There is no distension.      Palpations: Abdomen is soft. There is no mass.      Tenderness: There is no abdominal tenderness. There is no guarding or rebound.   Musculoskeletal:         General: No deformity. Normal range of motion.      Cervical back: Normal range of motion and neck supple.   Lymphadenopathy:      Cervical: No cervical adenopathy.   Skin:     General: Skin is warm and dry.      Coloration: Skin is not pale.      Findings: No erythema or rash.   Neurological:      Mental Status: She is alert and oriented to person, place, and time.      Coordination: Coordination normal.      Deep Tendon Reflexes: Reflexes normal.   Psychiatric:         Behavior: Behavior normal.         Thought Content: Thought content normal.         Judgment: Judgment normal.                  Assessment and Plan      Encounter for Medicare annual wellness exam  Regular exercise/healthy diet. BSE q month. Sunscreen use encouraged.   Living will - she  has and is on file  [facility]  scheduled for June  Colon due 8/25 -we will go ahead and order  Pneumovax- had  Prevnar  13- had, but over 5 yrs, so we will do prevnar 20 today  DT - allergic to this  DEXA scheduled for June (osteopenia, on fosamax)  Shingles- shingrix discussed -  can check at pharmacy  RSV vaccine - discussed today         Routine general medical examination at a health care facility    Orders:    CBC (No Diff); Future    TSH Rfx On Abnormal To Free T4; Future    Lipid Panel; Future    Comprehensive Metabolic Panel; Future    Pure hypercholesterolemia       Orders:    CBC (No Diff); Future    TSH Rfx On Abnormal To Free T4; Future    Lipid Panel; Future    Comprehensive Metabolic Panel; Future    Essential hypertension      Orders:    CBC (No Diff); Future    TSH Rfx On Abnormal To Free T4; Future    Lipid Panel; Future    Comprehensive Metabolic Panel; Future    Colon cancer screening    Orders:    Ambulatory Referral For Screening Colonoscopy            Follow Up   No follow-ups on file.  Patient was given instructions and counseling regarding her condition or for health maintenance advice. Please see specific information pulled into the AVS if appropriate.

## 2025-05-14 NOTE — LETTER
[facility]  Vaccine Consent Form    Patient Name:  Cindy Phillips  Patient :  1945     Vaccine(s) Ordered    Pneumococcal Conjugate Vaccine 20-Valent (PCV20)        Screening Checklist  The following questions should be completed prior to vaccination. If you answer “yes” to any question, it does not necessarily mean you should not be vaccinated. It just means we may need to clarify or ask more questions. If a question is unclear, please ask your healthcare provider to explain it.    Yes No   Any fever or moderate to severe illness today (mild illness and/or antibiotic treatment are not contraindications)?     Do you have a history of a serious reaction to any previous vaccinations, such as anaphylaxis, encephalopathy within 7 days, Guillain-Tehuacana syndrome within 6 weeks, seizure?     Have you received any live vaccine(s) (e.g MMR, ANAYELI) or any other vaccines in the last month (to ensure duplicate doses aren't given)?     Do you have an anaphylactic allergy to latex (DTaP, DTaP-IPV, Hep A, Hep B, MenB, RV, Td, Tdap), baker’s yeast (Hep B, HPV), polysorbates (RSV, nirsevimab, PCV 20, Rotavirrus, Tdap, Shingrix), or gelatin (ANAYELI, MMR)?     Do you have an anaphylactic allergy to neomycin (Rabies, ANAYELI, MMR, IPV, Hep A), polymyxin B (IPV), or streptomycin (IPV)?      Any cancer, leukemia, AIDS, or other immune system disorder? (ANAYELI, MMR, RV)     Do you have a parent, brother, or sister with an immune system problem (if immune competence of vaccine recipient clinically verified, can proceed)? (MMR, ANAYELI)     Any recent steroid treatments for >2 weeks, chemotherapy, or radiation treatment? (ANAYELI, MMR)     Have you received antibody-containing blood transfusions or IVIG in the past 11 months (recommended interval is dependent on product)? (MMR, ANAYELI)     Have you taken antiviral drugs (acyclovir, famciclovir, valacyclovir for ANAYELI) in the last 24 or 48 hours, respectively?      Are you pregnant or planning to become  "pregnant within 1 month? (ANAYELI, MMR, HPV, IPV, MenB, Abrexvy; For Hep B- refer to Engerix-B; For RSV - Abrysvo is indicated for 32-36 weeks of pregnancy from September to January)     For infants, have you ever been told your child has had intussusception or a medical emergency involving obstruction of the intestine (Rotavirus)? If not for an infant, can skip this question.         *Ordering Physicians/APC should be consulted if \"yes\" is checked by the patient or guardian above.  I have received, read, and understand the Vaccine Information Statement (VIS) for each vaccine ordered.  I have considered my or my child's health status as well as the health status of my close contacts.  I have taken the opportunity to discuss my vaccine questions with my or my child's health care provider.   I have requested that the ordered vaccine(s) be given to me or my child.  I understand the benefits and risks of the vaccines.  I understand that I should remain in the clinic for 15 minutes after receiving the vaccine(s).  _________________________________________________________  Signature of Patient or Parent/Legal Guardian ____________________  Date   "

## 2025-05-19 ENCOUNTER — RESULTS FOLLOW-UP (OUTPATIENT)
Dept: INTERNAL MEDICINE | Facility: CLINIC | Age: 80
End: 2025-05-19
Payer: MEDICARE

## 2025-05-20 ENCOUNTER — APPOINTMENT (OUTPATIENT)
Dept: PHYSICAL THERAPY | Facility: HOSPITAL | Age: 80
End: 2025-05-20
Payer: MEDICARE

## 2025-05-22 ENCOUNTER — HOSPITAL ENCOUNTER (OUTPATIENT)
Dept: PHYSICAL THERAPY | Facility: HOSPITAL | Age: 80
Setting detail: THERAPIES SERIES
Discharge: HOME | End: 2025-05-22
Payer: MEDICARE

## 2025-05-22 DIAGNOSIS — R26.89 POOR BALANCE: Primary | ICD-10-CM

## 2025-05-22 PROCEDURE — 97112 NEUROMUSCULAR REEDUCATION: CPT | Performed by: PHYSICAL THERAPIST

## 2025-05-22 PROCEDURE — 97110 THERAPEUTIC EXERCISES: CPT | Performed by: PHYSICAL THERAPIST

## 2025-05-22 NOTE — PROGRESS NOTES
"PT Progress Note        Patient: Cindy Phillips   : 1945  MRN: [id number]   Diagnosis/ICD-10 Code:      ICD-10-CM ICD-9-CM   1. Poor balance  R26.89 781.99      Referring practitioner: Janey Adan MD  Today's Date: 2025    Subjective:   Cindy Phillips reports: \"I'm doing alright today.\"  Subjective Questionnaire: n/a  Clinical Progress: improved  Home Program Compliance: Yes  Treatment has included: therapeutic exercise, neuromuscular re-education, therapeutic activity, and gait training    Subjective Evaluation    Pain  No pain reported         Objective     Exercise 1  Exercise Name 1: NuStep B UE/LEs; level 6  Time 1: 8 mins  Additional Comments: 53 spm  Exercise 2  Exercise Name 2: Re-assessment completed, see for details  Exercise 3  Exercise Name 3: DLP  Sets 3: 2  Time 3: 2 min  Additional Comments: total gym  Exercise 4  Exercise Name 4: St balance with walking fwd along balance beam; B sidestepping along beam with red tband around thighs; holding staggered with LE behind tapping cone in front; standing sideways on beam with alternating tapping cone in front; B full turns standing on beam  Reps 4: 10  Additional Comments: balance beam; min/mod A; LOB 60% of the time  Exercise 5  Exercise Name 5: B heel raises  Sets 5: 2  Reps 5: 20  Additional Comments: total gym      Functional Testing  Outcome Measure Options: 10 Meter Walk, Foster Balance, Timed Up and Go (TUG)  TU.30 sec  10 M: 14.44 sec  FOSTER/56    PT Neuro    Balance Skills Training  05717 -  PT Neuromuscular Reeducation Minutes: 25    Assessment & Plan       Assessment  Impairments: abnormal coordination, abnormal gait, activity intolerance, impaired balance, impaired physical strength, lacks appropriate home exercise program, pain with function and safety issue   Functional limitations: carrying objects, lifting, walking, pulling, standing, stooping and reaching overhead   Assessment details: Pt did not meet any goals today " "but did improve FOSTER balance score.  Pt has LOB up to 60% of the time with standing balance on beam.  Pt requires VCing to lift LEs with sidestepping on beam to work on balance.  Pt continues to demonstrate flexed posture with ambulation using rollator when asked to increase ethel.  Pt to benefit from skilled PT services to improve overall functional mobility, strength and balance to meet goals and urinary issues.  Prognosis: good    Goals  Plan Goals: STG (6 visits)  1. Patient to improve FOSTER balance score to >/= 40 /56 to decrease client's risk of falls. ONGOING  2. Patient to perform TUG within 12 sec with AD, without LOB for improved functional mobility. ONGOING  3. Patient to ambulate 10 meters with AD within 13 sec without LOB for improved gait ethel and functional mobility. ONGOING  4. Patient to ascend/descend 12 steps with B HRs and recip pattern without LOB for improved functional mobility at home. ONGOING  5. Patient to report 50% decreased leakage daily, for improved QOL. NEW  6. Patient to report 50% decreased urinary urgency for improved QOL and decreased fall risk. NEW     LTG (12 visits)  1. Patient to improve FOSTER balance score to >/= 48/56 to decrease client's risk of falls. ONGOING  2. Patient to perform TUG within 11 sec with AD, without LOB for improved functional mobility. ONGOING  3. Patient to ambulate 10 meters with AD within 12 sec without LOB for improved gait ethel and functional mobility. ONGOING  4. Patient to perform stair climbing 12 steps with 1HR and recip pattern without LOB for improved functional mobility. ONGOING  5. Pt to perform sit to stand without UE A from at least 20\" height 3/3 trails without LOB for improved functional mobility.ONGOING  5. Patient to be I with HE. ONGOING  6. Patient to report 50% decreased leakage daily, for improved QOL. NEW  7. Patient to report 50% decreased urinary urgency for improved QOL and decreased fall risk. NEW  8. Patient to report " 4-5 BM per week with bristol stool chart average of 4-5, for improved PF function. NEW      Plan  Therapy options: will be seen for skilled therapy services  Planned modality interventions: electrical stimulation/Russian stimulation  Planned therapy interventions: gait training, functional ROM exercises, home exercise program, neuromuscular re-education, strengthening, stretching, therapeutic activities, abdominal trunk stabilization, balance/weight-bearing training, postural training, manual therapy, body mechanics training, flexibility and soft tissue mobilization  Frequency: 1x week  Duration in visits: 5  Treatment plan discussed with: patient  Plan details: Patient will be seen 1x/wk x 5 visits with treatment to include strengthening, stretching,  neuromuscular re-education, balance, gait and endurance training.         Progress toward previous goals: ongoing      Recommendations: Continue as planned  Timeframe: 5 visits  Prognosis to achieve goals: good    Therapy Charges for Today       Code Description Service Date Service Provider Modifiers Qty    19317186210  PT NEUROMUSC RE EDUCATION EA 15 MIN 5/22/2025 Umu Rojo, PT GP 2    53143433073 HC PT THER PROC EA 15 MIN 5/22/2025 Umu Rojo, PT GP 1            PT Signature: Umu Rojo PT  [place] License #: [id number]    Based upon review of the patient's progress and continued therapy plan, it is my medical opinion that Cindy Phillips should continue physical therapy treatment at [facility] OP PT at [facility].    Signature: __________________________________  Janey Adan MD

## 2025-05-28 ENCOUNTER — HOSPITAL ENCOUNTER (OUTPATIENT)
Dept: PHYSICAL THERAPY | Facility: HOSPITAL | Age: 80
Setting detail: THERAPIES SERIES
Discharge: HOME | End: 2025-05-28
Payer: MEDICARE

## 2025-05-28 DIAGNOSIS — R32 INCONTINENCE OF URINE IN FEMALE: Primary | ICD-10-CM

## 2025-05-28 PROCEDURE — 97112 NEUROMUSCULAR REEDUCATION: CPT

## 2025-05-28 NOTE — THERAPY TREATMENT NOTE
Physical Therapy Daily Note      Patient: Cindy Phillips   : 1945  MRN: [id number]   Diagnosis/ICD-10 Code:      ICD-10-CM ICD-9-CM   1. Incontinence of urine in female  R32 788.30      Referring practitioner: Janey Adan MD  Today's Date: 2025    Subjective:   Patient reports: Patient reports she has only had leakage when she drinks decaffeinated coke and the PF contraction strategies have not been helpful while walking to the bathroom  Pain: did not rate  Treatment has included: therapeutic exercise and neuromuscular re-education      Subjective      Objective   See Exercise, Manual, and Modality Logs for complete treatment.     25 1600   Total Minutes   31727 -  PT Neuromuscular Reeducation Minutes 30  (including subjective)   Exercise 3   Exercise Name 3 Gait in hallway working on standing PF contractions. Pt trialed 5 quick flicks vs 5 sec hold. She will trial 5 sec hold to prevent leakage when its happening   Cueing 3 Verbal;Demo   Reps 3 160'   Additional Comments HEP, see scanned in chart   Exercise 4   Exercise Name 4 DLP w/ hip adduction and PF contraction   Cueing 4 Verbal   Reps 4 2 x 15     PT Pelvic     Balance Skills Training  18336 -  PT Neuromuscular Reeducation Minutes: 30 (including subjective)    Assessment & Plan       Assessment  Assessment details: Pt to trial PF contraction with 5 sec hold to prevent leakage amount on the wall to the bathroom. Preparing for PT DC with plan to add hamstring strengthening to HEP. Patient would continue to benefit from skilled PT services to achieve highest level of functioning.      Plan  Plan details: Patient to continue with PT services to improve PF coordination, PF strength, core strength, decrease pain and improve bowel/bladder function.            Therapy Charges for Today       Code Description Service Date Service Provider Modifiers Qty    63039821382  PT NEUROMUSC RE EDUCATION EA 15 MIN 2025 Cesia Granados, PT GP 2           PT SIGNATURE: Cesia Granados PT, DPT  [place] License # [id number]  Physical Therapist

## 2025-06-03 ENCOUNTER — HOSPITAL ENCOUNTER (OUTPATIENT)
Dept: PHYSICAL THERAPY | Facility: HOSPITAL | Age: 80
Setting detail: THERAPIES SERIES
Discharge: HOME | End: 2025-06-03
Payer: MEDICARE

## 2025-06-03 DIAGNOSIS — R26.89 POOR BALANCE: Primary | ICD-10-CM

## 2025-06-03 PROCEDURE — 97110 THERAPEUTIC EXERCISES: CPT | Performed by: PHYSICAL THERAPIST

## 2025-06-03 PROCEDURE — 97530 THERAPEUTIC ACTIVITIES: CPT | Performed by: PHYSICAL THERAPIST

## 2025-06-03 PROCEDURE — 97112 NEUROMUSCULAR REEDUCATION: CPT | Performed by: PHYSICAL THERAPIST

## 2025-06-03 NOTE — THERAPY TREATMENT NOTE
"Physical Therapy Daily Note      Patient: Cindy Phillips   : 1945  MRN: [id number]   Diagnosis/ICD-10 Code:      ICD-10-CM ICD-9-CM   1. Poor balance  R26.89 781.99      Referring practitioner: Janey Adan MD  Today's Date: 6/3/2025      Subjective:   Patient reports: \"I'm doing alright.\"  Pain: 0/10    Objective   See Exercise, Manual, and Modality Logs for complete treatment.    Exercise 1  Exercise Name 1: NuStep B UE/LEs; level 6  Time 1: 8 mins  Additional Comments: spm  Exercise 2  Exercise Name 2: B fwd and rotational step to rounded side of BOSU with toss/catch ball on/off rebounder  Additional Comments: BOSU; rebounder; red ball  Exercise 3  Exercise Name 3: Sit to stand from chair with foam cushion without UE A with max A; with B UE A with min/mod A  Reps 3: 10  Additional Comments: chair; blue foam; min to max A  Exercise 4  Exercise Name 4: Standing strengthening with B heel raises, B knee flexion with tband resistance  Sets 4: 2  Reps 4: 10  Additional Comments: green tband; min A; HEP      PT Neuro     Transfers  50262 - PT Therapeutic Activity Minutes: 10  Balance Skills Training  99067 -  PT Neuromuscular Reeducation Minutes: 15    Assessment & Plan       Assessment  Assessment details: Pt requires max A to perform sit to stand from elevated surface without UE A.  Pt requires min/mod A with sit to stand with B UE A on parallel bars with VCing to try and use mostly B LEs.  Pt requires min A with standing dynamic balance to BOSU with LOB up to 20% of the time.  Pt to benefit from skilled PT services to meet goals.     Plan  Plan details: Pt to continue with PT services to improve gait, balance, strength, and overall functional mobility.          Therapy Charges for Today       Code Description Service Date Service Provider Modifiers Qty    24694862534 HC PT NEUROMUSC RE EDUCATION EA 15 MIN 6/3/2025 Umu Rojo, PT GP 1    89160118892 HC PT THER PROC EA 15 MIN 6/3/2025 Darrel, " Umu PENNINGTON, PT GP 1    [id number]  PT THERAPEUTIC ACT EA 15 MIN 6/3/2025 Umu Rojo, PT GP 1            Umu Rojo, PT  [place] License #: [id number]    Physical Therapist

## 2025-06-06 ENCOUNTER — HOSPITAL ENCOUNTER (OUTPATIENT)
Dept: BONE DENSITY | Facility: HOSPITAL | Age: 80
Discharge: HOME | End: 2025-06-06
Payer: MEDICARE

## 2025-06-06 ENCOUNTER — HOSPITAL ENCOUNTER (OUTPATIENT)
Dept: MAMMOGRAPHY | Facility: HOSPITAL | Age: 80
Discharge: HOME | End: 2025-06-06
Payer: MEDICARE

## 2025-06-06 DIAGNOSIS — Z12.31 VISIT FOR SCREENING MAMMOGRAM: ICD-10-CM

## 2025-06-06 DIAGNOSIS — Z78.0 POSTMENOPAUSAL: ICD-10-CM

## 2025-06-06 PROCEDURE — 77063 BREAST TOMOSYNTHESIS BI: CPT

## 2025-06-06 PROCEDURE — 77067 SCR MAMMO BI INCL CAD: CPT

## 2025-06-06 PROCEDURE — 77080 DXA BONE DENSITY AXIAL: CPT

## 2025-06-08 ENCOUNTER — RESULTS FOLLOW-UP (OUTPATIENT)
Dept: INTERNAL MEDICINE | Facility: CLINIC | Age: 80
End: 2025-06-08
Payer: MEDICARE

## 2025-06-09 ENCOUNTER — APPOINTMENT (OUTPATIENT)
Dept: PHYSICAL THERAPY | Facility: HOSPITAL | Age: 80
End: 2025-06-09
Payer: MEDICARE

## 2025-06-10 ENCOUNTER — HOSPITAL ENCOUNTER (OUTPATIENT)
Dept: PHYSICAL THERAPY | Facility: HOSPITAL | Age: 80
Setting detail: THERAPIES SERIES
Discharge: HOME | End: 2025-06-10
Payer: MEDICARE

## 2025-06-10 DIAGNOSIS — R26.89 POOR BALANCE: Primary | ICD-10-CM

## 2025-06-10 PROCEDURE — 97530 THERAPEUTIC ACTIVITIES: CPT

## 2025-06-10 PROCEDURE — 97112 NEUROMUSCULAR REEDUCATION: CPT

## 2025-06-10 PROCEDURE — 97110 THERAPEUTIC EXERCISES: CPT

## 2025-06-10 NOTE — THERAPY TREATMENT NOTE
Physical Therapy Daily Note      Patient: Cindy Phillips   : 1945  MRN: [id number]   Diagnosis/ICD-10 Code:      ICD-10-CM ICD-9-CM   1. Poor balance  R26.89 781.99      Referring practitioner: Janey Adan MD  Today's Date: 6/10/2025    Subjective:   Patient reports: no pain today. Went to the concert recently and had a great time. Says her left leg has been feeling a little bit weaker today compared to last session.  Pain: 0/10  Treatment has included: therapeutic exercise, neuromuscular re-education, and therapeutic activity    Objective          Ambulation     Ambulation: Level Surfaces   Ambulation with assistive device: independent (rollator)    Additional Level Surfaces Ambulation Details  Slow and deliberate, reciprocal stepping with forward trunk posture, Bi-knee hyperextension in terminal stance and reduced L knee side-to-side control.      See Exercise, Manual, and Modality Logs for complete treatment.    PT Neuro       Assessment & Plan       Assessment  Impairments: abnormal gait, activity intolerance, impaired balance, impaired physical strength and weight-bearing intolerance   Functional limitations: carrying objects, lifting, walking, pulling, pushing and stooping   Assessment details: Patient presented with improved SL LE control during dynamic lunging activities. Was able to control swinging leg during return with slightly reduced contralateral pelvic drop. Continues to have difficulty with proper knee alignment (L worse than R) with squatting. Will continue progressing balance and functional strengthening as tolerated.    Plan  Plan details: Will continue with current plan to progress dynamic balance activities and functional strengthening as tolerated.          Therapy Charges for Today       Code Description Service Date Service Provider Modifiers Qty    [id number]  PT NEUROMUSC RE EDUCATION EA 15 MIN 6/10/2025 Erica Chaudhari, PT Student GP 1    [id number]  PT THER PROC EA 15  MIN 6/10/2025 Erica Chaudhari, PT Student GP 1    [id number] HC PT THERAPEUTIC ACT EA 15 MIN 6/10/2025 Erica Chaudhari, PT Student GP 1

## 2025-06-12 ENCOUNTER — HOSPITAL ENCOUNTER (OUTPATIENT)
Dept: PHYSICAL THERAPY | Facility: HOSPITAL | Age: 80
Setting detail: THERAPIES SERIES
Discharge: HOME | End: 2025-06-12
Payer: MEDICARE

## 2025-06-12 DIAGNOSIS — R32 INCONTINENCE OF URINE IN FEMALE: Primary | ICD-10-CM

## 2025-06-12 PROCEDURE — 97530 THERAPEUTIC ACTIVITIES: CPT

## 2025-06-12 PROCEDURE — 97110 THERAPEUTIC EXERCISES: CPT

## 2025-06-12 NOTE — THERAPY PROGRESS REPORT/RE-CERT
PT Progress Note  [facility]  [address]    Patient: Cindy Phillips   : 1945  Diagnosis/ICD-10 Code:      ICD-10-CM ICD-9-CM   1. Incontinence of urine in female  R32 788.30      MRN: [id number]    Referring practitioner: Janey Adan MD  Today's Date: 2025    Subjective:   Patient reports: she is ready to be complete with the pelvic floor/core strength portion of current PT POC  Pain: 0  Clinical Progress: improved  Home Program Compliance: Yes  Treatment has included: therapeutic exercise and neuromuscular re-education    Subjective    Objective   See Exercise, Manual, and Modality Logs for complete treatment.       25 1200   Total Minutes   60940 - PT Therapeutic Exercise Minutes 23   68032 - PT Therapeutic Activity Minutes 15  (inc time for subjective)   Exercise 2   Exercise Name 2 STS from varied surfaces, no hands required. Unable to ascend from 17 in surfaces w/out using UE   Cueing 2 Verbal   Reps 2 40   Additional Comments 25 in, 19 inch surface, trials at 17 in   Exercise 3   Exercise Name 3 bridging w/hip adduction + PF contraction reivew   Reps 3 15   Additional Comments no cues required   Exercise 4   Exercise Name 4 DLP w/ hip adduction and PF contraction   Cueing 4 Verbal   Reps 4 2 x 15   Additional Comments 1 VC required   Exercise 5   Exercise Name 5 final review of HEP + review of PF goals          PT Pelvic    Transfers  73847 - PT Therapeutic Activity Minutes: 15 (inc time for subjective)    Assessment & Plan       Assessment  Assessment details: Pelvic floor/core strength program progress note completed this date; Pt met 7 goals. Pt suitable for DC and required only 1 VC during core strength HEP. She is able to stand up without UE support from 19 in surface this date. Pt to continue with balance PT POC later this month.    Goals  Plan Goals: Plan Goals: STG (6 visits)  1. Patient to improve FOSTER balance score to >/= 40 /56 to decrease  "client's risk of falls. ONGOING  2. Patient to perform TUG within 12 sec with AD, without LOB for improved functional mobility. ONGOING  3. Patient to ambulate 10 meters with AD within 13 sec without LOB for improved gait ethel and functional mobility. met  4. Patient to ascend/descend 12 steps with B HRs and recip pattern without LOB for improved functional mobility at home. ONGOING  5. Patient to report 50% decreased leakage daily, for improved QOL. MET  6. Patient to report 50% decreased urinary urgency for improved QOL and decreased fall risk. MET     LTG (12 visits)  1. Patient to improve FOSTER balance score to >/= 48/56 to decrease client's risk of falls. ONGOING  2. Patient to perform TUG within 11 sec with AD, without LOB for improved functional mobility. ONGOING  3. Patient to ambulate 10 meters with AD within 12 sec without LOB for improved gait ethel and functional mobility. ONGOING  4. Patient to perform stair climbing 12 steps with 1HR and recip pattern without LOB for improved functional mobility. ONGOING  5. Pt to perform sit to stand without UE A from at least 20\" height 3/3 trails without LOB for improved functional mobility. MET  5. Patient to be I with HE. MET  6. Patient to report 50% decreased leakage daily, for improved QOL. MET  7. Patient to report 50% decreased urinary urgency for improved QOL and decreased fall risk. MET  8. Patient to report 4-5 BM per week with bristol stool chart average of 4-5, for improved PF function. MET         Plan  Frequency: 1x week  Plan details: Continue balance PT          Progress toward previous goals: Partially Met      Recommendations: Continue as planned  Timeframe: 1 visit  Therapy Charges for Today       Code Description Service Date Service Provider Modifiers Qty    [id number]  PT THER PROC EA 15 MIN 6/12/2025 Cesia Granados, PT GP 2    [id number]  PT THERAPEUTIC ACT EA 15 MIN 6/12/2025 Cesia Granados, PT GP 1          PT Signature: Cesia Granados, " PT, DPT  [place] License #: [id number]    Based upon review of the patient's progress and continued therapy plan, it is my medical opinion that Cindy Phillips should continue physical therapy treatment at [facility] OP PT at [facility].    Signature: __________________________________  Janey Adan MD

## 2025-06-17 ENCOUNTER — APPOINTMENT (OUTPATIENT)
Dept: PHYSICAL THERAPY | Facility: HOSPITAL | Age: 80
End: 2025-06-17
Payer: MEDICARE

## 2025-06-23 ENCOUNTER — APPOINTMENT (OUTPATIENT)
Dept: PHYSICAL THERAPY | Facility: HOSPITAL | Age: 80
End: 2025-06-23
Payer: MEDICARE

## 2025-06-24 ENCOUNTER — HOSPITAL ENCOUNTER (OUTPATIENT)
Dept: PHYSICAL THERAPY | Facility: HOSPITAL | Age: 80
Setting detail: THERAPIES SERIES
Discharge: HOME | End: 2025-06-24
Payer: MEDICARE

## 2025-06-24 ENCOUNTER — APPOINTMENT (OUTPATIENT)
Dept: PHYSICAL THERAPY | Facility: HOSPITAL | Age: 80
End: 2025-06-24
Payer: MEDICARE

## 2025-06-24 DIAGNOSIS — R32 INCONTINENCE OF URINE IN FEMALE: Primary | ICD-10-CM

## 2025-06-24 DIAGNOSIS — R26.89 POOR BALANCE: ICD-10-CM

## 2025-06-24 PROCEDURE — 97530 THERAPEUTIC ACTIVITIES: CPT

## 2025-06-24 PROCEDURE — 97110 THERAPEUTIC EXERCISES: CPT

## 2025-06-24 NOTE — THERAPY PROGRESS REPORT/RE-CERT
PT Discharge Note  [facility]  [address]    Patient: Cindy Phillips   : 1945  Diagnosis/ICD-10 Code:      ICD-10-CM ICD-9-CM   1. Incontinence of urine in female  R32 788.30   2. Poor balance  R26.89 781.99      MRN: [id number]    Referring practitioner: Janey Adan MD  Today's Date: 2025    Subjective:   Functional outcomes:    TU.7 sec  10 M: 13.9 sec  FOSTER/56    Patient reports: feeling pretty good today.   Pain: 0/10  Clinical Progress: improved  Home Program Compliance: Yes  Treatment has included: therapeutic exercise and therapeutic activity    Objective          Ambulation     Ambulation: Stairs   Ascend stairs: independent  Pattern: reciprocal  Railings: two rails  Descend stairs: independent  Pattern: reciprocal  Railings: two rails    Additional Stairs Ambulation Details  Slow but controlled throughout.      See Exercise, Manual, and Modality Logs for complete treatment.    PT Neuro    Transfers  69367 - PT Therapeutic Activity Minutes: (P) 12    Assessment & Plan       Assessment  Impairments: abnormal gait, impaired balance, impaired physical strength and weight-bearing intolerance   Functional limitations: carrying objects, walking and stooping (Climbing stairs and curbs  )  Assessment details: Patient improved with FOSTER, TUG and 10 meter walk test this session. Able to perform stair climbing reciprocally but required extra time and used caution. Patient continues to improve with sit to stand transfers, and was recommended to perform this exercise as maintenance of leg strength. Patient will be discharged from PT services at this time due to completion of all allotted visits and independence with ongoing HEP.     Goals  Plan Goals: Plan Goals: STG (6 visits)  1. Patient to improve FOSTER balance score to >/= 40 /56 to decrease client's risk of falls. MET  2. Patient to perform TUG within 12 sec with AD, without LOB for improved functional  "mobility. NOT MET  3. Patient to ambulate 10 meters with AD within 13 sec without LOB for improved gait ethel and functional mobility. MET  4. Patient to ascend/descend 12 steps with B HRs and recip pattern without LOB for improved functional mobility at home. MET  5. Patient to report 50% decreased leakage daily, for improved QOL. - NOT MET  6. Patient to report 50% decreased urinary urgency for improved QOL and decreased fall risk. - NOT MET     LTG (12 visits)  1. Patient to improve FOSTER balance score to >/= 48/56 to decrease client's risk of falls. NOT MET  2. Patient to perform TUG within 11 sec with AD, without LOB for improved functional mobility. NOT MET  3. Patient to ambulate 10 meters with AD within 12 sec without LOB for improved gait ethel and functional mobility. NOT MET  4. Patient to perform stair climbing 12 steps with 1HR and recip pattern without LOB for improved functional mobility. ONGOING  5. Pt to perform sit to stand without UE A from at least 20\" height 3/3 trails without LOB for improved functional mobility. MET  5. Patient to be I with HEP. MET  6. Patient to report 50% decreased leakage daily, for improved QOL. NOT MET  7. Patient to report 50% decreased urinary urgency for improved QOL and decreased fall risk. NOT MET  8. Patient to report 4-5 BM per week with bristol stool chart average of 4-5, for improved PF function. NOT MET      Plan  Therapy options: will not be seen for skilled therapy services  Plan details: Discharge OP PT services at this time.      Progress toward previous goals: Partially Met  Recommendations: Discharge    Therapy Charges for Today       Code Description Service Date Service Provider Modifiers Qty    [id number]  PT THER PROC EA 15 MIN 6/24/2025 Erica Chaudhari, PT Student GP 2    [id number]  PT THERAPEUTIC ACT EA 15 MIN 6/24/2025 Erica Chaudhari, PT Student GP 1            Based upon review of the patient's progress and continued therapy plan, it is my " medical opinion that Cindy Phillips should continue physical therapy treatment at [facility] OP PT at [facility].    Signature: __________________________________  Janey Adan MD

## 2025-07-02 ENCOUNTER — TELEPHONE (OUTPATIENT)
Dept: INTERNAL MEDICINE | Facility: CLINIC | Age: 80
End: 2025-07-02
Payer: MEDICARE

## 2025-07-02 NOTE — TELEPHONE ENCOUNTER
Caller: Cindy Phillips    Relationship: Self    Best call back number: [phone]        What specialty or service is being requested: NEUROLOGIST     What is the provider, practice or medical service name: [facility]     What is the office location: [address] [place] [address]     What is the office phone number: [phone]    Any additional details: THE PATIENT STATES THAT SHE CURRENTLY SEE ANOTHER NEUROLOGIST BUT SHE WOULD LIKE TO SEE [facility] INSTEAD       ”

## 2025-07-07 DIAGNOSIS — G11.4 HEREDITARY SPASTIC PARAPARESIS: Primary | ICD-10-CM

## 2025-07-07 DIAGNOSIS — R26.89 POOR BALANCE: Chronic | ICD-10-CM
